# Patient Record
Sex: MALE | Race: WHITE | Employment: OTHER | URBAN - METROPOLITAN AREA
[De-identification: names, ages, dates, MRNs, and addresses within clinical notes are randomized per-mention and may not be internally consistent; named-entity substitution may affect disease eponyms.]

---

## 2020-03-25 ENCOUNTER — APPOINTMENT (OUTPATIENT)
Dept: GENERAL RADIOLOGY | Facility: CLINIC | Age: 75
DRG: 870 | End: 2020-03-25
Attending: INTERNAL MEDICINE
Payer: MEDICARE

## 2020-03-25 ENCOUNTER — HOSPITAL ENCOUNTER (INPATIENT)
Facility: CLINIC | Age: 75
LOS: 10 days | Discharge: ANOTHER HOSPITAL | DRG: 870 | End: 2020-04-04
Attending: INTERNAL MEDICINE | Admitting: INTERNAL MEDICINE
Payer: MEDICARE

## 2020-03-25 DIAGNOSIS — J69.0 ASPIRATION PNEUMONIA, UNSPECIFIED ASPIRATION PNEUMONIA TYPE, UNSPECIFIED LATERALITY, UNSPECIFIED PART OF LUNG (H): ICD-10-CM

## 2020-03-25 DIAGNOSIS — Z00.6 RESEARCH SUBJECT: Primary | ICD-10-CM

## 2020-03-25 DIAGNOSIS — Z00.6 RESEARCH STUDY PATIENT: Primary | ICD-10-CM

## 2020-03-25 DIAGNOSIS — U07.1 COVID-19 VIRUS INFECTION: ICD-10-CM

## 2020-03-25 PROBLEM — I10 ESSENTIAL HYPERTENSION, BENIGN: Status: ACTIVE | Noted: 2020-03-25

## 2020-03-25 LAB
ALBUMIN SERPL-MCNC: 2.1 G/DL (ref 3.4–5)
ALP SERPL-CCNC: 39 U/L (ref 40–150)
ALT SERPL W P-5'-P-CCNC: 28 U/L (ref 0–70)
ANION GAP SERPL CALCULATED.3IONS-SCNC: 4 MMOL/L (ref 3–14)
AST SERPL W P-5'-P-CCNC: 49 U/L (ref 0–45)
BASE DEFICIT BLDA-SCNC: 1.3 MMOL/L
BASE DEFICIT BLDA-SCNC: 6.6 MMOL/L
BILIRUB SERPL-MCNC: 0.4 MG/DL (ref 0.2–1.3)
BUN SERPL-MCNC: 13 MG/DL (ref 7–30)
CA-I BLD-MCNC: 3.4 MG/DL (ref 4.4–5.2)
CALCIUM SERPL-MCNC: 7.1 MG/DL (ref 8.5–10.1)
CHLORIDE SERPL-SCNC: 106 MMOL/L (ref 94–109)
CK SERPL-CCNC: 390 U/L (ref 30–300)
CO2 SERPL-SCNC: 25 MMOL/L (ref 20–32)
CREAT SERPL-MCNC: 0.89 MG/DL (ref 0.66–1.25)
CRP SERPL-MCNC: 180 MG/L (ref 0–8)
D DIMER PPP FEU-MCNC: 2.4 UG/ML FEU (ref 0–0.5)
ERYTHROCYTE [DISTWIDTH] IN BLOOD BY AUTOMATED COUNT: 13.2 % (ref 10–15)
GFR SERPL CREATININE-BSD FRML MDRD: 84 ML/MIN/{1.73_M2}
GLUCOSE SERPL-MCNC: 86 MG/DL (ref 70–99)
HCO3 BLD-SCNC: 17 MMOL/L (ref 21–28)
HCO3 BLD-SCNC: 23 MMOL/L (ref 21–28)
HCT VFR BLD AUTO: 37.3 % (ref 40–53)
HGB BLD-MCNC: 12.5 G/DL (ref 13.3–17.7)
INR PPP: 1.11 (ref 0.86–1.14)
LACTATE BLD-SCNC: 0.5 MMOL/L (ref 0.7–2)
MAGNESIUM SERPL-MCNC: 2.1 MG/DL (ref 1.6–2.3)
MCH RBC QN AUTO: 31.1 PG (ref 26.5–33)
MCHC RBC AUTO-ENTMCNC: 33.5 G/DL (ref 31.5–36.5)
MCV RBC AUTO: 93 FL (ref 78–100)
MRSA DNA SPEC QL NAA+PROBE: NEGATIVE
O2/TOTAL GAS SETTING VFR VENT: 100 %
O2/TOTAL GAS SETTING VFR VENT: 60 %
PCO2 BLD: 26 MM HG (ref 35–45)
PCO2 BLD: 38 MM HG (ref 35–45)
PH BLD: 7.4 PH (ref 7.35–7.45)
PH BLD: 7.42 PH (ref 7.35–7.45)
PHOSPHATE SERPL-MCNC: 1.7 MG/DL (ref 2.5–4.5)
PLATELET # BLD AUTO: 82 10E9/L (ref 150–450)
PO2 BLD: 75 MM HG (ref 80–105)
PO2 BLD: 77 MM HG (ref 80–105)
POTASSIUM BLD-SCNC: 2.5 MMOL/L (ref 3.4–5.3)
POTASSIUM SERPL-SCNC: 3.6 MMOL/L (ref 3.4–5.3)
PROCALCITONIN SERPL-MCNC: 0.29 NG/ML
PROT SERPL-MCNC: 5.5 G/DL (ref 6.8–8.8)
RBC # BLD AUTO: 4.02 10E12/L (ref 4.4–5.9)
RESEARCH KIT COLLECTION: NORMAL
RESEARCH KIT COLLECTION: NORMAL
SODIUM SERPL-SCNC: 135 MMOL/L (ref 133–144)
SPECIMEN SOURCE: NORMAL
TROPONIN I SERPL-MCNC: 0.04 UG/L (ref 0–0.04)
WBC # BLD AUTO: 4.5 10E9/L (ref 4–11)

## 2020-03-25 PROCEDURE — 85610 PROTHROMBIN TIME: CPT | Performed by: INTERNAL MEDICINE

## 2020-03-25 PROCEDURE — 87040 BLOOD CULTURE FOR BACTERIA: CPT | Performed by: INTERNAL MEDICINE

## 2020-03-25 PROCEDURE — 85379 FIBRIN DEGRADATION QUANT: CPT | Performed by: INTERNAL MEDICINE

## 2020-03-25 PROCEDURE — 85027 COMPLETE CBC AUTOMATED: CPT | Performed by: INTERNAL MEDICINE

## 2020-03-25 PROCEDURE — 83605 ASSAY OF LACTIC ACID: CPT | Performed by: INTERNAL MEDICINE

## 2020-03-25 PROCEDURE — 36415 COLL VENOUS BLD VENIPUNCTURE: CPT | Performed by: INTERNAL MEDICINE

## 2020-03-25 PROCEDURE — 93005 ELECTROCARDIOGRAM TRACING: CPT

## 2020-03-25 PROCEDURE — 25000128 H RX IP 250 OP 636: Performed by: INTERNAL MEDICINE

## 2020-03-25 PROCEDURE — 83615 LACTATE (LD) (LDH) ENZYME: CPT | Performed by: INTERNAL MEDICINE

## 2020-03-25 PROCEDURE — 40000275 ZZH STATISTIC RCP TIME EA 10 MIN

## 2020-03-25 PROCEDURE — 83735 ASSAY OF MAGNESIUM: CPT | Performed by: INTERNAL MEDICINE

## 2020-03-25 PROCEDURE — 86140 C-REACTIVE PROTEIN: CPT | Performed by: INTERNAL MEDICINE

## 2020-03-25 PROCEDURE — 25800030 ZZH RX IP 258 OP 636: Performed by: STUDENT IN AN ORGANIZED HEALTH CARE EDUCATION/TRAINING PROGRAM

## 2020-03-25 PROCEDURE — 82803 BLOOD GASES ANY COMBINATION: CPT | Performed by: INTERNAL MEDICINE

## 2020-03-25 PROCEDURE — 25000128 H RX IP 250 OP 636: Performed by: STUDENT IN AN ORGANIZED HEALTH CARE EDUCATION/TRAINING PROGRAM

## 2020-03-25 PROCEDURE — 40000937 ZZHCL STATISTIC RESEARCH KIT COLLECTION: Performed by: INTERNAL MEDICINE

## 2020-03-25 PROCEDURE — 80053 COMPREHEN METABOLIC PANEL: CPT | Performed by: INTERNAL MEDICINE

## 2020-03-25 PROCEDURE — 87641 MR-STAPH DNA AMP PROBE: CPT | Performed by: INTERNAL MEDICINE

## 2020-03-25 PROCEDURE — 84100 ASSAY OF PHOSPHORUS: CPT | Performed by: INTERNAL MEDICINE

## 2020-03-25 PROCEDURE — 40000986 XR ABDOMEN PORT 1 VW

## 2020-03-25 PROCEDURE — 82550 ASSAY OF CK (CPK): CPT | Performed by: INTERNAL MEDICINE

## 2020-03-25 PROCEDURE — 87640 STAPH A DNA AMP PROBE: CPT | Performed by: INTERNAL MEDICINE

## 2020-03-25 PROCEDURE — 25000125 ZZHC RX 250: Performed by: STUDENT IN AN ORGANIZED HEALTH CARE EDUCATION/TRAINING PROGRAM

## 2020-03-25 PROCEDURE — 40000986 XR CHEST PORT 1 VW

## 2020-03-25 PROCEDURE — 99291 CRITICAL CARE FIRST HOUR: CPT | Mod: GC | Performed by: INTERNAL MEDICINE

## 2020-03-25 PROCEDURE — 99001 SPECIMEN HANDLING PT-LAB: CPT | Performed by: INTERNAL MEDICINE

## 2020-03-25 PROCEDURE — 84132 ASSAY OF SERUM POTASSIUM: CPT | Performed by: INTERNAL MEDICINE

## 2020-03-25 PROCEDURE — 82803 BLOOD GASES ANY COMBINATION: CPT | Performed by: STUDENT IN AN ORGANIZED HEALTH CARE EDUCATION/TRAINING PROGRAM

## 2020-03-25 PROCEDURE — 25000132 ZZH RX MED GY IP 250 OP 250 PS 637: Mod: GY | Performed by: STUDENT IN AN ORGANIZED HEALTH CARE EDUCATION/TRAINING PROGRAM

## 2020-03-25 PROCEDURE — 5A1955Z RESPIRATORY VENTILATION, GREATER THAN 96 CONSECUTIVE HOURS: ICD-10-PCS | Performed by: INTERNAL MEDICINE

## 2020-03-25 PROCEDURE — 84484 ASSAY OF TROPONIN QUANT: CPT | Performed by: INTERNAL MEDICINE

## 2020-03-25 PROCEDURE — 85379 FIBRIN DEGRADATION QUANT: CPT | Performed by: STUDENT IN AN ORGANIZED HEALTH CARE EDUCATION/TRAINING PROGRAM

## 2020-03-25 PROCEDURE — 94002 VENT MGMT INPAT INIT DAY: CPT

## 2020-03-25 PROCEDURE — 20000004 ZZH R&B ICU UMMC

## 2020-03-25 PROCEDURE — 00000146 ZZHCL STATISTIC GLUCOSE BY METER IP

## 2020-03-25 PROCEDURE — 93010 ELECTROCARDIOGRAM REPORT: CPT | Performed by: INTERNAL MEDICINE

## 2020-03-25 PROCEDURE — 84145 PROCALCITONIN (PCT): CPT | Performed by: INTERNAL MEDICINE

## 2020-03-25 PROCEDURE — 82330 ASSAY OF CALCIUM: CPT | Performed by: INTERNAL MEDICINE

## 2020-03-25 RX ORDER — ONDANSETRON 4 MG/1
4 TABLET, ORALLY DISINTEGRATING ORAL EVERY 6 HOURS PRN
Status: DISCONTINUED | OUTPATIENT
Start: 2020-03-25 | End: 2020-04-04 | Stop reason: HOSPADM

## 2020-03-25 RX ORDER — POLYETHYLENE GLYCOL 3350 17 G/17G
17 POWDER, FOR SOLUTION ORAL DAILY PRN
Status: DISCONTINUED | OUTPATIENT
Start: 2020-03-25 | End: 2020-04-04 | Stop reason: HOSPADM

## 2020-03-25 RX ORDER — PROPOFOL 10 MG/ML
10-20 INJECTION, EMULSION INTRAVENOUS EVERY 30 MIN PRN
Status: DISCONTINUED | OUTPATIENT
Start: 2020-03-25 | End: 2020-03-25

## 2020-03-25 RX ORDER — FENTANYL CITRATE 50 UG/ML
25-50 INJECTION, SOLUTION INTRAMUSCULAR; INTRAVENOUS
Status: DISCONTINUED | OUTPATIENT
Start: 2020-03-25 | End: 2020-03-26

## 2020-03-25 RX ORDER — AMOXICILLIN 250 MG
2 CAPSULE ORAL 2 TIMES DAILY PRN
Status: DISCONTINUED | OUTPATIENT
Start: 2020-03-25 | End: 2020-03-26

## 2020-03-25 RX ORDER — PROPOFOL 10 MG/ML
10-20 INJECTION, EMULSION INTRAVENOUS EVERY 30 MIN PRN
Status: DISCONTINUED | OUTPATIENT
Start: 2020-03-25 | End: 2020-03-26

## 2020-03-25 RX ORDER — CEFTRIAXONE 2 G/1
2 INJECTION, POWDER, FOR SOLUTION INTRAMUSCULAR; INTRAVENOUS EVERY 24 HOURS
Status: DISCONTINUED | OUTPATIENT
Start: 2020-03-25 | End: 2020-03-29

## 2020-03-25 RX ORDER — POTASSIUM CHLORIDE 7.45 MG/ML
10 INJECTION INTRAVENOUS
Status: DISCONTINUED | OUTPATIENT
Start: 2020-03-25 | End: 2020-03-27

## 2020-03-25 RX ORDER — POTASSIUM CHLORIDE 750 MG/1
20-40 TABLET, EXTENDED RELEASE ORAL
Status: DISCONTINUED | OUTPATIENT
Start: 2020-03-25 | End: 2020-03-27

## 2020-03-25 RX ORDER — NALOXONE HYDROCHLORIDE 0.4 MG/ML
.1-.4 INJECTION, SOLUTION INTRAMUSCULAR; INTRAVENOUS; SUBCUTANEOUS
Status: DISCONTINUED | OUTPATIENT
Start: 2020-03-25 | End: 2020-03-25

## 2020-03-25 RX ORDER — VECURONIUM BROMIDE 1 MG/ML
10 INJECTION, POWDER, LYOPHILIZED, FOR SOLUTION INTRAVENOUS ONCE
Status: COMPLETED | OUTPATIENT
Start: 2020-03-25 | End: 2020-03-25

## 2020-03-25 RX ORDER — MAGNESIUM SULFATE HEPTAHYDRATE 40 MG/ML
4 INJECTION, SOLUTION INTRAVENOUS EVERY 4 HOURS PRN
Status: DISCONTINUED | OUTPATIENT
Start: 2020-03-25 | End: 2020-04-04 | Stop reason: HOSPADM

## 2020-03-25 RX ORDER — ALBUTEROL SULFATE 90 UG/1
6 AEROSOL, METERED RESPIRATORY (INHALATION) EVERY 4 HOURS PRN
Status: DISCONTINUED | OUTPATIENT
Start: 2020-03-25 | End: 2020-04-04 | Stop reason: HOSPADM

## 2020-03-25 RX ORDER — AMOXICILLIN 250 MG
1 CAPSULE ORAL 2 TIMES DAILY PRN
Status: DISCONTINUED | OUTPATIENT
Start: 2020-03-25 | End: 2020-03-26

## 2020-03-25 RX ORDER — MIDAZOLAM (PF) 1 MG/ML IN 0.9 % SODIUM CHLORIDE INTRAVENOUS SOLUTION
1-8 CONTINUOUS
Status: DISCONTINUED | OUTPATIENT
Start: 2020-03-25 | End: 2020-04-04 | Stop reason: HOSPADM

## 2020-03-25 RX ORDER — ACETAMINOPHEN 325 MG/1
650 TABLET ORAL EVERY 4 HOURS PRN
Status: DISCONTINUED | OUTPATIENT
Start: 2020-03-25 | End: 2020-03-25

## 2020-03-25 RX ORDER — PROPOFOL 10 MG/ML
5-75 INJECTION, EMULSION INTRAVENOUS CONTINUOUS
Status: DISCONTINUED | OUTPATIENT
Start: 2020-03-25 | End: 2020-03-26

## 2020-03-25 RX ORDER — POTASSIUM CHLORIDE 1.5 G/1.58G
20-40 POWDER, FOR SOLUTION ORAL
Status: DISCONTINUED | OUTPATIENT
Start: 2020-03-25 | End: 2020-03-27

## 2020-03-25 RX ORDER — FAMOTIDINE 20 MG/1
20 TABLET, FILM COATED ORAL 2 TIMES DAILY
Status: DISCONTINUED | OUTPATIENT
Start: 2020-03-25 | End: 2020-04-04 | Stop reason: HOSPADM

## 2020-03-25 RX ORDER — NALOXONE HYDROCHLORIDE 0.4 MG/ML
.1-.4 INJECTION, SOLUTION INTRAMUSCULAR; INTRAVENOUS; SUBCUTANEOUS
Status: DISCONTINUED | OUTPATIENT
Start: 2020-03-25 | End: 2020-04-04 | Stop reason: HOSPADM

## 2020-03-25 RX ORDER — PROPOFOL 10 MG/ML
5-75 INJECTION, EMULSION INTRAVENOUS CONTINUOUS
Status: DISCONTINUED | OUTPATIENT
Start: 2020-03-25 | End: 2020-03-25

## 2020-03-25 RX ORDER — POTASSIUM CHLORIDE 29.8 MG/ML
20 INJECTION INTRAVENOUS
Status: DISCONTINUED | OUTPATIENT
Start: 2020-03-25 | End: 2020-03-27

## 2020-03-25 RX ORDER — FUROSEMIDE 10 MG/ML
40 INJECTION INTRAMUSCULAR; INTRAVENOUS ONCE
Status: COMPLETED | OUTPATIENT
Start: 2020-03-25 | End: 2020-03-25

## 2020-03-25 RX ORDER — POTASSIUM CL/LIDO/0.9 % NACL 10MEQ/0.1L
10 INTRAVENOUS SOLUTION, PIGGYBACK (ML) INTRAVENOUS
Status: DISCONTINUED | OUTPATIENT
Start: 2020-03-25 | End: 2020-03-27

## 2020-03-25 RX ORDER — DOXAZOSIN 1 MG/1
1 TABLET ORAL DAILY
Status: DISCONTINUED | OUTPATIENT
Start: 2020-03-26 | End: 2020-04-04 | Stop reason: HOSPADM

## 2020-03-25 RX ORDER — TAMSULOSIN HYDROCHLORIDE 0.4 MG/1
0.4 CAPSULE ORAL DAILY
Status: DISCONTINUED | OUTPATIENT
Start: 2020-03-26 | End: 2020-03-25

## 2020-03-25 RX ORDER — ONDANSETRON 2 MG/ML
4 INJECTION INTRAMUSCULAR; INTRAVENOUS EVERY 6 HOURS PRN
Status: DISCONTINUED | OUTPATIENT
Start: 2020-03-25 | End: 2020-04-04 | Stop reason: HOSPADM

## 2020-03-25 RX ADMIN — PROPOFOL 10 MG: 10 INJECTION, EMULSION INTRAVENOUS at 21:40

## 2020-03-25 RX ADMIN — FENTANYL CITRATE 25 MCG: 50 INJECTION, SOLUTION INTRAMUSCULAR; INTRAVENOUS at 21:30

## 2020-03-25 RX ADMIN — PROPOFOL 65 MCG/KG/MIN: 10 INJECTION, EMULSION INTRAVENOUS at 18:11

## 2020-03-25 RX ADMIN — Medication 50 MCG/HR: at 16:14

## 2020-03-25 RX ADMIN — ACETAMINOPHEN 650 MG: 325 SOLUTION ORAL at 16:00

## 2020-03-25 RX ADMIN — ENOXAPARIN SODIUM 40 MG: 40 INJECTION SUBCUTANEOUS at 17:57

## 2020-03-25 RX ADMIN — PROPOFOL 70 MCG/KG/MIN: 10 INJECTION, EMULSION INTRAVENOUS at 14:33

## 2020-03-25 RX ADMIN — PROPOFOL 55 MCG/KG/MIN: 10 INJECTION, EMULSION INTRAVENOUS at 21:26

## 2020-03-25 RX ADMIN — PROPOFOL 70 MCG/KG/MIN: 10 INJECTION, EMULSION INTRAVENOUS at 14:51

## 2020-03-25 RX ADMIN — FAMOTIDINE 20 MG: 20 TABLET ORAL at 20:14

## 2020-03-25 RX ADMIN — FUROSEMIDE 40 MG: 10 INJECTION, SOLUTION INTRAMUSCULAR; INTRAVENOUS at 23:39

## 2020-03-25 RX ADMIN — CEFTRIAXONE SODIUM 2 G: 2 INJECTION, POWDER, FOR SOLUTION INTRAMUSCULAR; INTRAVENOUS at 21:25

## 2020-03-25 RX ADMIN — AZITHROMYCIN MONOHYDRATE 250 MG: 500 INJECTION, POWDER, LYOPHILIZED, FOR SOLUTION INTRAVENOUS at 22:24

## 2020-03-25 RX ADMIN — DEXTROSE MONOHYDRATE 3 MCG/KG/MIN: 50 INJECTION, SOLUTION INTRAVENOUS at 23:45

## 2020-03-25 RX ADMIN — VECURONIUM BROMIDE 10 MG: 1 INJECTION, POWDER, LYOPHILIZED, FOR SOLUTION INTRAVENOUS at 23:36

## 2020-03-25 RX ADMIN — SODIUM PHOSPHATE, MONOBASIC, MONOHYDRATE AND SODIUM PHOSPHATE, DIBASIC, ANHYDROUS 20 MMOL: 276; 142 INJECTION, SOLUTION INTRAVENOUS at 17:57

## 2020-03-25 RX ADMIN — CALCIUM GLUCONATE 2 G: 98 INJECTION, SOLUTION INTRAVENOUS at 16:02

## 2020-03-25 RX ADMIN — PROPOFOL 70 MCG/KG/MIN: 10 INJECTION, EMULSION INTRAVENOUS at 16:01

## 2020-03-25 RX ADMIN — FENTANYL CITRATE 50 MCG: 50 INJECTION, SOLUTION INTRAMUSCULAR; INTRAVENOUS at 23:52

## 2020-03-25 ASSESSMENT — ACTIVITIES OF DAILY LIVING (ADL)
ADLS_ACUITY_SCORE: 15
ADLS_ACUITY_SCORE: 13

## 2020-03-25 NOTE — H&P
MICU History and Physical  [facility], [place]  Date of Admission: March 25, 2020    Chief Complaint: Difficulty breathing  -----------------------------------------------------------------  History of Present Illness     Eduardo Kemp is  73 YO M with PMH of HTN, HLD, and BPH who is transferring from [facility] ICU to [facility] [place] ICU for Remdesivir trial.     Returned from [place] on 3/19. He had been feeling weak and fatigued since return home. He wa seen in the ED on March 21 for dehydration. Went to [facility] on 3/23, he reported fever to 102.3, shortness of breath with O2 sat in 80s. Denied vomiting or diarrhea. No international travel and not a resident of a nursing home.      Labs initially showed pancytopenia with WBC of 3.9 and platelets of 73, INR 1.2 sodium 131 creatinine 1.21 as well as low albumin of 2.8, LFTs appeared to be unremarkable lactic acid was 1.5, glycosylated hemoglobin A1c is 5.2, VBG showing pH of 7.43 as well as PCO2 of 33. Cardiac enzymes were negative. UA was negative for esterase as well as nitrite. Patient did undergo CT head with no acute finding, chest x-ray was notable for PNA, showed patchy infiltrates at the left lung base as well as small infiltrate in the right midlung reflecting pneumonia. He was started on cefdinir ad azithromycin. COVID-19 testing sent. Admitted to [facility] ICU 3/25 in early AM due to respiratory failure. Intubated. COVID-19 testing returned positive on 3/25.     [facility] placed right internal jugular catheter and brachial arterial line. Remains intubated. Last ventilator settings were AC RR 20,  mL, PEEP 12, FiO2 60%. On Propofol. Not on pressors. No paralytics. Transferred to [facility] [place] for enrollment in Remdesivir trial.      -----------------------------------------------------------------  Assessment & Plan   NEURO/PSYCH  # Sedation  - Propofol gtt    # Pain  - Fentanyl gtt, and fentanyl prn    #  Acute encephalopathy 2/2 sedatives  Patient initially presented weak following a syncopal event at home. A CT Head on 3/23 at OSH showed no acute intracranial hemorrhage or acute abnormality of the brain, and mild diffuse cerebral atrophy and chronic white matter ischemic change. Currently sedated on propofol.    CARDIOVASCULAR  # Hemodynamics  Stable. Not requiring pressors at OSH.    # Bradycardia  Cardiology at OSH consulted. Tele without evidence of pauses or heart block. Per cards note, bradycardia may be due to hypoxia, apnea with sleep and increased vagal tone, and his infection.  - telemetry  - EKG  - consider TTE following acute COVID infection    # Elevated Troponin  Initial trop <0.056 -> 0.056 this AM. Trop on arrival 0.036. EKG on arrival showing sinus bradycardia    # H/o HTN  -Hold PTA amlodipine 5 mg   -Hold PTA hydrochlorothiazide 12.5 mg daily    PULMONARY  # ARDS  # Acute Hypoxic Respiratory Failure 2/2 COVID-19   P:F ratio in 120s. Not proning at this time. No paralytics given.  - repeat ABG in AM  Ventilation Mode: CMV/AC  (Continuous Mandatory Ventilation/ Assist Control)  FiO2 (%): 60 %  Rate Set (breaths/minute): 20 breaths/min  Tidal Volume Set (mL): 450 mL  PEEP (cm H2O): 12 cmH2O  Oxygen Concentration (%): 60 %  Resp: 21    GI  # Acute diarrhea   Per literature GI symptoms seen early in COVID-19 cases. Enteric panel and CDiff neg.  - CTM  - prn Zofran for nausea    # Nutrition: Orders Placed This Encounter      NPO for Medical/Clinical Reasons Except for: NPO but receiving Tube Feeding  - will discuss TF's in AM    # Stress Ulcer prophylaxis: Famotidine    RENAL  # Hypokalemia  - K replacement protocol  - Mg and Phosph replacement protocol  - BMP on arrival    # Hyponatremia, resolved  Patient treated for hypovolemia at OSH in setting of diarrhea and diuresis on HCTX.   - track I's and O's  - BMP on arrival    # Hypocalcemia  iCa on arrival 3.4. S/p 2 g Calcium gluconate.  - repeat iCa  -  replete prn    # BPH  -PTA on tamsulosin 0.4 mg daily; hold    # Volume - Euvolemic  S/p IVF at OSH in setting of initial dehydration.    HEME/ONC  # Pancytopenia  - trend CBC    # Transfusion History: none    ENDOCRINE  BG stable. HgbA1c 5.2.  - q4 BG because NPO    INFECTIOUS DISEASE  # Sepsis  # COVID-19 Infection  Initially had extensive infectious work-up at OSH. C Diff neg, enteric panel neg. Flu A and B neg. Strep pneumo and Legionella neg. UA neg for LE and nitrite. 1 of 2 Blood cultures on 03/23 grew Staph Epidermidis. ID consulted and suspected contaminant. CXR showing bilateral pulmonary infiltrates. COVID testing positive on 03/25/20. OSH discussed with RACHELL. Elevated D-dimer (443 at OSH, normal 0-230), , normal WBC with lymphopenia 0.63. BUN 12 and Cre 0.77, ALT 27, AST 47, AP 37.  - Infectious disease consult  - Remdesivir study, consented family over phone, pharmacy aware and will randomize treatment  - Contact, droplet, airborne precautions  - Supportive therapy  - CBC with diff, CMP, CK, troponin  - Blood cultures  - no Tylenol    # Antimicrobials  Stop Cefdinir, Start Ceftriaxone 2 g IV, duration until 03/27  Azithromycine 250 mg qd    # Cultures  - Blood culture 03/25/20    SKIN/MSK  PT and OT consult    Lines: RIJ and R brachial artery               ETT, OG vs NG    Prophylaxis:     - DVT: Enoxaparin   - GI: Famotidine  Family: updated wife  Disposition: ICU  Code Status: Full    Krzysztof Owens MD  Internal Medicine, PGY-1  MICU-2, [id number]    -----------------------------------------------------------------  Physical Exam   Blood pressure 117/63, temperature 102.7  F (39.3  C), resp. rate 21, weight 108.5 kg (239 lb 3.2 oz), SpO2 93 %.    I/O last 3 completed shifts:  In: -   Out: 125 [Urine:125]  Wt Readings from Last 5 Encounters:   03/25/20 108.5 kg (239 lb 3.2 oz)       Ventilation Mode: CMV/AC  (Continuous Mandatory Ventilation/ Assist Control)  FiO2 (%): 60 %  Rate Set (breaths/minute):  20 breaths/min  Tidal Volume Set (mL): 450 mL  PEEP (cm H2O): 12 cmH2O  Oxygen Concentration (%): 60 %  Resp: 21    Arterial Line BP: (105-122)/(43-55) 105/45  MAP:  [63 mmHg-75 mmHg] 63 mmHg    Exam deferred. See attending note.    -----------------------------------------------------------------  Additional Patient History  Review of Systems   The 10 point Review of Systems is negative other than noted in the HPI or here.    Past Medical History    - ACE-inhibitor-aggravated Angioedema  - HLD  - HTN    Past Surgical History   - Hemorrhoid banding  - Tonsillectomy    Social History   Per chart review, former smoker - 20 pack year history. Occasional alcohol use.     Family History   - Coronary heart disease, brother  - Stroke  - Heart disease, father  - Hypertension     Prior to Admission Medications   No current facility-administered medications on file prior to encounter.   No current outpatient medications on file prior to encounter.    Allergies      Allergies   Allergen Reactions     Ace Inhibitors      Possible angioedema 7/15.       Pertinent Data:     OSH Labs:  CBC: WBC 4.3, Abs Lymph 0.8, Hgb 11, Plt 75  BMP: Na 137, K 3.4, Cl 108, Co2 23, BUN 12, Cre 0.77 BG 94  ALT 27, AST 47, AP 37, TP 5.3, DB 0.14, TB 0.3  PT 13.5, INR 1.2  AG 6, Ca 7.1  Lactate: 1.5  Trop 0.056, , DDimer 443    DIAGNOSTIC STUDIES  ROUTINE ICU LABS  CMP  Recent Labs   Lab 03/25/20  1447 03/25/20  1446     --    POTASSIUM 3.6 2.5*   CHLORIDE 106  --    CO2 25  --    ANIONGAP 4  --    GLC 86  --    BUN 13  --    CR 0.89  --    GFRESTIMATED 84  --    GFRESTBLACK >90  --    RICKY 7.1*  --    MAG 2.1  --    PHOS 1.7*  --    PROTTOTAL 5.5*  --    ALBUMIN 2.1*  --    BILITOTAL 0.4  --    ALKPHOS 39*  --    AST 49*  --    ALT 28  --      CBC  Recent Labs   Lab 03/25/20  1447   WBC 4.5   RBC 4.02*   HGB 12.5*   HCT 37.3*   MCV 93   MCH 31.1   MCHC 33.5   RDW 13.2   PLT 82*     INR  Recent Labs   Lab 03/25/20  1447   INR 1.11      Lactic Acid  Recent Labs   Lab 03/25/20  1446   LACT 0.5*     Urine StudiesNo lab results found.  Arterial Blood Gas  Recent Labs   Lab 03/25/20  1446   PH 7.42   PCO2 26*   PO2 77*   HCO3 17*   O2PER 60     Venous Blood Gas   Recent Labs   Lab 03/25/20  1446   O2PER 60       MICROBIOLOGY  Significant culture results:    03/25/20 Blood culture: pending    IMAGING  03/25/20 CXR 2  Result Impression   IMPRESSION:     1.  Right IJ line terminates in the distal SVC 2.9 cm above the cavoatrial junction.  2.  Lines and tubes otherwise stable.  3.  Increased retrocardiac consolidation with bilateral asymmetric infiltrates again noted.     03/25/20 CXR 1  There are extensive bilateral pulmonary infiltrates most prominent in the mid and lower lungs with marked increase since the prior study. The heart size and mediastinal contours are stable. No pneumothorax. No significant pleural effusion evident.    03/23/20 EKG  Rate: 45. QtC 436  Rhythm: sinus bradycardia, normal intervals, normal axis  No new ST/T changessuggestive of ischemia    03/23/20 CXR  Patchy opacities at the left lung base involving the lingula and left lower lobe consistent with pneumonia. Small infiltrate in the right mid lung also likely reflects an area of pneumonia.    03/23/20 CT Head  Result Impression   IMPRESSION:    1.  No acute intracranial hemorrhage or acute abnormality of the brain.  2.  Mild diffuse cerebral atrophy and chronic white matter ischemic change.

## 2020-03-25 NOTE — PROGRESS NOTES
Admitted/transferred from: [facility] at ~1400  Reason for admission/transfer: Covid (+) admitted for drug trial.  Patient status upon admission/transfer: Sedated, RASS -4, BP via A-line 110's/60's. Febrile, 102.5 axillary.   Interventions: Attached to ICU monitors, labs drawn.   Plan: Maintain current vent settings, add continuous Fentanyl for comfort, wean Propofol to achieve RASS of -1 to 0.   2 RN skin assessment: completed by SUSAN Mcfadden and SUSAN Lobo  Result of skin assessment and interventions/actions: CDI, intact.  Height, weight, drug calc weight: done.  Patient belongings (see Flowsheet - Adult Profile for details): clothing, shoes, Brayan, cell phone, several electronic cords.  MDRO education (if applicable): N/A - pt intubated and heavily sedated.

## 2020-03-25 NOTE — PROGRESS NOTES
ICU End of Shift Summary. See flowsheets for vital signs and detailed assessment.    Changes this shift: Weaning sedation slowly to achieve lighter RASS, pupils pinpoint and only coughing noted w/suctioning. Fentanyl gtt added for comfort. NPO, 1x dose of tylenol give for fever - per research team, NO remaining tylenol to given. 1st dose of Remdesivir given.     Plan: Continue to monitor closely overnight. Recheck phos at 00 per replacement protocol.

## 2020-03-25 NOTE — CONSULTS
[facility] ID Service: Initial Consultation     Patient:  Eduardo Kemp  Date of birth 1945  Medical record number [id number]  Consult Requested by: MICU Team         Assessment and Recommendations:   Problem List:  1) COVID-19  2) Acute hypoxic respiratory failure  3) Possible secondary LLL bacterial pneumonia  4) Thrombocytopenia  5) Elevated CK  6) Minimally elevated troponin  7) Bradycardia  8) S. Epidermidis in blood culture (3/23)  9) HTN    Recommendations:  1) CBC with diff, CMP, CK, troponin  2) Agree with blood cultures  3) Continue azithromycin 250mg daily through 3/27 (dose today)  4) Start ceftriaxone 2g IV daily (dose today)  5) Remdesivir clinical trial labs and study drug per clinical research team    Discussion:  Eduardo Kemp is a 74 year old male with HTN and remote history of tobacco use who was admitted to [facility] on 3/25 with COVID-19. Initially presented to [facility] on 3/23 with weakness, fever to 102.3, shortness of breath, and diarrhea for four days prior in setting of travel to [place]. He was subsequently admitted with COVID-19 testing sent on admission and he was started on cefdinir and azithromycin for possible LLL pneumonia. Early 3/25 he had respiratory decompensation with shortness of breath, increased work of breathing, and hypoxia requiring intubation following which he was transferred first to [facility] and then to [facility]. Notable laboratory abnormalities lymphocytopenia with jak  (3/23), thrombocytopenia with jak 68 (3/24),  (3/25), and troponin 0.056 (3/25).    He is currently enrolled in remdesivir clinical trial. Given LLL infiltrate and hypoxic respiratory failure would complete antibiotic course for community acquired pneumonia as well. Appreciate supportive care measures provided by MICU team.    Patient was discussed with attending physician, Dr. Lake.  Recommendations were conveyed to the primary team via  telephone.  ID will continue to follow    Laura Blankenship DO  Infectious Disease Fellow    Attestation:  I discussed this patient with the fellow and/or resident(s) and agree with the findings and plan in this note. I also personally edited this note to reflect my findings. I have reviewed today's vital signs, medications, labs and imaging.    I did not see patient on 3/25/2020 in order to limit in person exposure and conserve PPE     Douglas Lake MD,M.Med.Sc.  Infectious Diseases  Pager: [phone]        Time spent reviewing chart : 30 min        History of Present Illness:   Eduardo Kemp is a 74 year old male with pertinent comorbid conditions of HTN who was admitted on 3/25/2020 as transfer for Covid-19 clinical trial and ID consulted for assistance with management.      Initially had symptoms of weakness and fatigue since travel to [place] about four days prior to admission at OSH. Seen on 3/21 in ED with dehydration and discharged then admitted to [place] 3/23 with weakness, fever (102), shortness of breath and diarrhea at which time flu was negative, covid sent, and cxr with left lung base patchy infiltrate. At admission SpO2 was in 80s and temp 102.3 Concern for CAP and started on azithromycin and cefdinir. Admission labs notable for mild lymphopenia and thrombocytopenia as well as hyponatremia and slightly elevated creatinine. Blood cultures with 1/2 positive for GPC identified as Staph epi and considered contaminant. Did have bradycardia to 30s with plan for nonurgent TTE at some point from cardiology. Overnight 3/24 - 3/25 noted to become more short of breath around 0200 and initially placed on 9L O2 via oxy mask. ABG done and 7.4/33/33 and subsequently intubated around 3am then transferred to [facility] where arterial line and central line were placed. Ceftriaxone and azithromycin not continued upon arrival.    In MICU he is currently intubated and sedated with propofol,  analgesia with fentanyl. He is not requiring pressors. Temp 102.6. Vent settings with FiO2 60% and PEEP 12. Patient not physically seen to minimize PPE use. Discussed patient briefly with bedside RN.         Review of Systems:   Complete ROS obtained, pertinent positives and negatives as above.       Past Medical and Surgical History:   HTN  BPH      Allergies:      Allergies   Allergen Reactions     Ace Inhibitors      Possible angioedema 7/15.            Current Antimicrobials:   Cefdinir 3/23 - 3/24  Azithromycin 3/23 - 3/24         Family History:   Cardiovascular disease noted in Care Everywhere (brother, father)         Social History:     Former smoker (20 pack years, quit 1980)  Alcohol use           Physical Exam:   Ranges forvital signs:  Temp:  [102.4  F (39.1  C)-102.6  F (39.2  C)] 102.6  F (39.2  C)  Heart Rate:  [56] 56  Resp:  [20] 20  BP: (117)/(63) 117/63  MAP:  [64 mmHg] 64 mmHg  Arterial Line BP: (112)/(44) 112/44  FiO2 (%):  [60 %] 60 %  SpO2:  [97 %] 97 %    Exam: deferred to preserve PPE; patient intubated and sedated in ICU         Laboratory Data:   Reviewed via Care Everywhere.  3/23  WBC 3.9   -   HGB 14.6  PLT 73  INR 1.2  Na 131  Cr 1.21  UA small occult blood  D dimer 443    3/35  WBC 4.3  HGB 11  PLT 75  Trop 0.056  Na 137  Cr 0.77    ABG 7.4/33/33 --> 7.39/34/79    Culture data:  Covid + 3/23  Flu A/B - 3/23  Stool pcr - 3/23  Cdiff - 3/23  BCx +S epi (3/23) - presumed contaminant           Imaging:   CXR 3/25  IMPRESSION:   1.  Right IJ line terminates in the distal SVC 2.9 cm above the cavoatrial junction.  2.  Lines and tubes otherwise stable.  3.  Increased retrocardiac consolidation with bilateral asymmetric infiltrates again noted.    CXR 3/23  FINDINGS: Low lung volumes. There is patchy infiltrate seen at the left lung base with some infiltrate projecting over the heart and posteriorly on the lateral view. Findings likely reflect pneumonia. Right midlung  opacity could also represent an area of pneumonia. No pleural effusion. Heart size is normal.    CT Head 3/23  IMPRESSION:  1.  No acute intracranial hemorrhage or acute abnormality of the brain.  2.  Mild diffuse cerebral atrophy and chronic white matter ischemic change.

## 2020-03-26 ENCOUNTER — APPOINTMENT (OUTPATIENT)
Dept: GENERAL RADIOLOGY | Facility: CLINIC | Age: 75
DRG: 870 | End: 2020-03-26
Attending: INTERNAL MEDICINE
Payer: MEDICARE

## 2020-03-26 ENCOUNTER — APPOINTMENT (OUTPATIENT)
Dept: CARDIOLOGY | Facility: CLINIC | Age: 75
DRG: 870 | End: 2020-03-26
Attending: INTERNAL MEDICINE
Payer: MEDICARE

## 2020-03-26 LAB
ALBUMIN SERPL-MCNC: 2 G/DL (ref 3.4–5)
ALP SERPL-CCNC: 41 U/L (ref 40–150)
ALT SERPL W P-5'-P-CCNC: 29 U/L (ref 0–70)
ANION GAP SERPL CALCULATED.3IONS-SCNC: 7 MMOL/L (ref 3–14)
AST SERPL W P-5'-P-CCNC: 56 U/L (ref 0–45)
BASE DEFICIT BLDA-SCNC: 0.2 MMOL/L
BASE DEFICIT BLDA-SCNC: 1.4 MMOL/L
BASE EXCESS BLDA CALC-SCNC: 0.3 MMOL/L
BASE EXCESS BLDA CALC-SCNC: 0.8 MMOL/L
BASE EXCESS BLDA CALC-SCNC: 1 MMOL/L
BASOPHILS # BLD AUTO: 0 10E9/L (ref 0–0.2)
BASOPHILS NFR BLD AUTO: 0 %
BILIRUB SERPL-MCNC: 0.3 MG/DL (ref 0.2–1.3)
BUN SERPL-MCNC: 11 MG/DL (ref 7–30)
CALCIUM SERPL-MCNC: 7.4 MG/DL (ref 8.5–10.1)
CHLORIDE SERPL-SCNC: 105 MMOL/L (ref 94–109)
CK SERPL-CCNC: 272 U/L (ref 30–300)
CO2 SERPL-SCNC: 24 MMOL/L (ref 20–32)
CREAT SERPL-MCNC: 0.9 MG/DL (ref 0.66–1.25)
CRP SERPL-MCNC: 250 MG/L (ref 0–8)
D DIMER PPP FEU-MCNC: 2.2 UG/ML FEU (ref 0–0.5)
DIFFERENTIAL METHOD BLD: ABNORMAL
EOSINOPHIL # BLD AUTO: 0 10E9/L (ref 0–0.7)
EOSINOPHIL NFR BLD AUTO: 0 %
ERYTHROCYTE [DISTWIDTH] IN BLOOD BY AUTOMATED COUNT: 13.2 % (ref 10–15)
FERRITIN SERPL-MCNC: 1687 NG/ML (ref 26–388)
FIBRINOGEN PPP-MCNC: 559 MG/DL (ref 200–420)
GFR SERPL CREATININE-BSD FRML MDRD: 83 ML/MIN/{1.73_M2}
GLUCOSE BLDC GLUCOMTR-MCNC: 109 MG/DL (ref 70–99)
GLUCOSE BLDC GLUCOMTR-MCNC: 80 MG/DL (ref 70–99)
GLUCOSE BLDC GLUCOMTR-MCNC: 80 MG/DL (ref 70–99)
GLUCOSE BLDC GLUCOMTR-MCNC: 84 MG/DL (ref 70–99)
GLUCOSE BLDC GLUCOMTR-MCNC: 84 MG/DL (ref 70–99)
GLUCOSE BLDC GLUCOMTR-MCNC: 89 MG/DL (ref 70–99)
GLUCOSE SERPL-MCNC: 99 MG/DL (ref 70–99)
HCO3 BLD-SCNC: 23 MMOL/L (ref 21–28)
HCO3 BLD-SCNC: 24 MMOL/L (ref 21–28)
HCO3 BLD-SCNC: 24 MMOL/L (ref 21–28)
HCO3 BLD-SCNC: 25 MMOL/L (ref 21–28)
HCO3 BLD-SCNC: 25 MMOL/L (ref 21–28)
HCT VFR BLD AUTO: 37.5 % (ref 40–53)
HGB BLD-MCNC: 12.9 G/DL (ref 13.3–17.7)
INTERPRETATION ECG - MUSE: NORMAL
LDH SERPL L TO P-CCNC: 556 U/L (ref 85–227)
LYMPHOCYTES # BLD AUTO: 0.9 10E9/L (ref 0.8–5.3)
LYMPHOCYTES NFR BLD AUTO: 15.7 %
MAGNESIUM SERPL-MCNC: 2 MG/DL (ref 1.6–2.3)
MCH RBC QN AUTO: 31.5 PG (ref 26.5–33)
MCHC RBC AUTO-ENTMCNC: 34.4 G/DL (ref 31.5–36.5)
MCV RBC AUTO: 92 FL (ref 78–100)
MONOCYTES # BLD AUTO: 0 10E9/L (ref 0–1.3)
MONOCYTES NFR BLD AUTO: 0 %
NEUTROPHILS # BLD AUTO: 4.7 10E9/L (ref 1.6–8.3)
NEUTROPHILS NFR BLD AUTO: 84.3 %
O2/TOTAL GAS SETTING VFR VENT: 100 %
O2/TOTAL GAS SETTING VFR VENT: 50 %
O2/TOTAL GAS SETTING VFR VENT: 50 %
O2/TOTAL GAS SETTING VFR VENT: 70 %
O2/TOTAL GAS SETTING VFR VENT: 80 %
OXYHGB MFR BLD: 95 % (ref 92–100)
PCO2 BLD: 36 MM HG (ref 35–45)
PCO2 BLD: 36 MM HG (ref 35–45)
PCO2 BLD: 37 MM HG (ref 35–45)
PCO2 BLD: 38 MM HG (ref 35–45)
PCO2 BLD: 39 MM HG (ref 35–45)
PH BLD: 7.39 PH (ref 7.35–7.45)
PH BLD: 7.42 PH (ref 7.35–7.45)
PH BLD: 7.44 PH (ref 7.35–7.45)
PH BLD: 7.44 PH (ref 7.35–7.45)
PH BLD: 7.45 PH (ref 7.35–7.45)
PHOSPHATE SERPL-MCNC: 3 MG/DL (ref 2.5–4.5)
PLATELET # BLD AUTO: 101 10E9/L (ref 150–450)
PLATELET # BLD EST: ABNORMAL 10*3/UL
PO2 BLD: 137 MM HG (ref 80–105)
PO2 BLD: 213 MM HG (ref 80–105)
PO2 BLD: 78 MM HG (ref 80–105)
PO2 BLD: 85 MM HG (ref 80–105)
PO2 BLD: 90 MM HG (ref 80–105)
POTASSIUM SERPL-SCNC: 3 MMOL/L (ref 3.4–5.3)
POTASSIUM SERPL-SCNC: 4 MMOL/L (ref 3.4–5.3)
PROT SERPL-MCNC: 5.4 G/DL (ref 6.8–8.8)
RBC # BLD AUTO: 4.1 10E12/L (ref 4.4–5.9)
RBC MORPH BLD: NORMAL
SODIUM SERPL-SCNC: 135 MMOL/L (ref 133–144)
WBC # BLD AUTO: 5.6 10E9/L (ref 4–11)

## 2020-03-26 PROCEDURE — 25000128 H RX IP 250 OP 636: Performed by: STUDENT IN AN ORGANIZED HEALTH CARE EDUCATION/TRAINING PROGRAM

## 2020-03-26 PROCEDURE — 25000132 ZZH RX MED GY IP 250 OP 250 PS 637: Mod: GY | Performed by: STUDENT IN AN ORGANIZED HEALTH CARE EDUCATION/TRAINING PROGRAM

## 2020-03-26 PROCEDURE — 25000125 ZZHC RX 250: Performed by: STUDENT IN AN ORGANIZED HEALTH CARE EDUCATION/TRAINING PROGRAM

## 2020-03-26 PROCEDURE — 82550 ASSAY OF CK (CPK): CPT | Performed by: STUDENT IN AN ORGANIZED HEALTH CARE EDUCATION/TRAINING PROGRAM

## 2020-03-26 PROCEDURE — 25000132 ZZH RX MED GY IP 250 OP 250 PS 637: Mod: GY | Performed by: NURSE PRACTITIONER

## 2020-03-26 PROCEDURE — 85025 COMPLETE CBC W/AUTO DIFF WBC: CPT | Performed by: STUDENT IN AN ORGANIZED HEALTH CARE EDUCATION/TRAINING PROGRAM

## 2020-03-26 PROCEDURE — 84100 ASSAY OF PHOSPHORUS: CPT | Performed by: STUDENT IN AN ORGANIZED HEALTH CARE EDUCATION/TRAINING PROGRAM

## 2020-03-26 PROCEDURE — 00000146 ZZHCL STATISTIC GLUCOSE BY METER IP

## 2020-03-26 PROCEDURE — 27210436 ZZH NUTRITION PRODUCT SEMIELEM INTERMED CAN

## 2020-03-26 PROCEDURE — 82805 BLOOD GASES W/O2 SATURATION: CPT | Performed by: STUDENT IN AN ORGANIZED HEALTH CARE EDUCATION/TRAINING PROGRAM

## 2020-03-26 PROCEDURE — 25000125 ZZHC RX 250: Performed by: NURSE PRACTITIONER

## 2020-03-26 PROCEDURE — 93325 DOPPLER ECHO COLOR FLOW MAPG: CPT | Mod: 26 | Performed by: INTERNAL MEDICINE

## 2020-03-26 PROCEDURE — 20000004 ZZH R&B ICU UMMC

## 2020-03-26 PROCEDURE — 25000128 H RX IP 250 OP 636: Performed by: NURSE PRACTITIONER

## 2020-03-26 PROCEDURE — 25000132 ZZH RX MED GY IP 250 OP 250 PS 637: Mod: GY | Performed by: INTERNAL MEDICINE

## 2020-03-26 PROCEDURE — 83735 ASSAY OF MAGNESIUM: CPT | Performed by: STUDENT IN AN ORGANIZED HEALTH CARE EDUCATION/TRAINING PROGRAM

## 2020-03-26 PROCEDURE — 82728 ASSAY OF FERRITIN: CPT | Performed by: STUDENT IN AN ORGANIZED HEALTH CARE EDUCATION/TRAINING PROGRAM

## 2020-03-26 PROCEDURE — 93321 DOPPLER ECHO F-UP/LMTD STD: CPT | Mod: 26 | Performed by: INTERNAL MEDICINE

## 2020-03-26 PROCEDURE — 85384 FIBRINOGEN ACTIVITY: CPT | Performed by: INTERNAL MEDICINE

## 2020-03-26 PROCEDURE — 86140 C-REACTIVE PROTEIN: CPT | Performed by: STUDENT IN AN ORGANIZED HEALTH CARE EDUCATION/TRAINING PROGRAM

## 2020-03-26 PROCEDURE — 40000275 ZZH STATISTIC RCP TIME EA 10 MIN

## 2020-03-26 PROCEDURE — 94003 VENT MGMT INPAT SUBQ DAY: CPT

## 2020-03-26 PROCEDURE — 82803 BLOOD GASES ANY COMBINATION: CPT | Performed by: STUDENT IN AN ORGANIZED HEALTH CARE EDUCATION/TRAINING PROGRAM

## 2020-03-26 PROCEDURE — 25800030 ZZH RX IP 258 OP 636: Performed by: STUDENT IN AN ORGANIZED HEALTH CARE EDUCATION/TRAINING PROGRAM

## 2020-03-26 PROCEDURE — 80053 COMPREHEN METABOLIC PANEL: CPT | Performed by: STUDENT IN AN ORGANIZED HEALTH CARE EDUCATION/TRAINING PROGRAM

## 2020-03-26 PROCEDURE — 84132 ASSAY OF SERUM POTASSIUM: CPT | Performed by: INTERNAL MEDICINE

## 2020-03-26 PROCEDURE — 71045 X-RAY EXAM CHEST 1 VIEW: CPT

## 2020-03-26 PROCEDURE — 99291 CRITICAL CARE FIRST HOUR: CPT | Mod: GC | Performed by: INTERNAL MEDICINE

## 2020-03-26 PROCEDURE — 93308 TTE F-UP OR LMTD: CPT | Mod: 26 | Performed by: INTERNAL MEDICINE

## 2020-03-26 PROCEDURE — 93325 DOPPLER ECHO COLOR FLOW MAPG: CPT

## 2020-03-26 RX ORDER — ASPIRIN 81 MG/1
81 TABLET, CHEWABLE ORAL DAILY
Status: ON HOLD | COMMUNITY
End: 2020-04-03

## 2020-03-26 RX ORDER — SILDENAFIL CITRATE 20 MG/1
40-100 TABLET ORAL PRN
Status: ON HOLD | COMMUNITY
End: 2020-04-03

## 2020-03-26 RX ORDER — AMOXICILLIN 250 MG
1 CAPSULE ORAL 2 TIMES DAILY
Status: DISCONTINUED | OUTPATIENT
Start: 2020-03-26 | End: 2020-04-01

## 2020-03-26 RX ORDER — POLYETHYLENE GLYCOL 3350 17 G/17G
17 POWDER, FOR SOLUTION ORAL DAILY
Status: DISCONTINUED | OUTPATIENT
Start: 2020-03-26 | End: 2020-03-28

## 2020-03-26 RX ORDER — HYDROCHLOROTHIAZIDE 12.5 MG/1
25 TABLET ORAL DAILY
Status: ON HOLD | COMMUNITY
End: 2020-04-03

## 2020-03-26 RX ORDER — CALCIUM POLYCARBOPHIL 625 MG 625 MG/1
1 TABLET ORAL 2 TIMES DAILY
Status: ON HOLD | COMMUNITY
End: 2020-04-03

## 2020-03-26 RX ORDER — FENTANYL CITRATE 50 UG/ML
0-50 INJECTION, SOLUTION INTRAMUSCULAR; INTRAVENOUS
Status: DISCONTINUED | OUTPATIENT
Start: 2020-03-26 | End: 2020-04-02

## 2020-03-26 RX ORDER — AMLODIPINE BESYLATE 5 MG/1
5 TABLET ORAL DAILY
Status: ON HOLD | COMMUNITY
End: 2020-04-03

## 2020-03-26 RX ORDER — NIACIN 500 MG
500 TABLET ORAL 2 TIMES DAILY
Status: ON HOLD | COMMUNITY
End: 2020-04-03

## 2020-03-26 RX ORDER — MULTIVITAMIN,THERAPEUTIC
1 TABLET ORAL DAILY
Status: ON HOLD | COMMUNITY
End: 2020-04-03

## 2020-03-26 RX ORDER — TAMSULOSIN HYDROCHLORIDE 0.4 MG/1
0.4 CAPSULE ORAL DAILY
Status: ON HOLD | COMMUNITY
End: 2020-04-03

## 2020-03-26 RX ORDER — AMOXICILLIN 250 MG
2 CAPSULE ORAL 2 TIMES DAILY
Status: DISCONTINUED | OUTPATIENT
Start: 2020-03-26 | End: 2020-04-01

## 2020-03-26 RX ORDER — ASPIRIN 81 MG
100 TABLET, DELAYED RELEASE (ENTERIC COATED) ORAL 2 TIMES DAILY
Status: ON HOLD | COMMUNITY
End: 2020-04-03

## 2020-03-26 RX ORDER — DEXTROSE MONOHYDRATE 100 MG/ML
INJECTION, SOLUTION INTRAVENOUS CONTINUOUS PRN
Status: DISCONTINUED | OUTPATIENT
Start: 2020-03-26 | End: 2020-04-04 | Stop reason: HOSPADM

## 2020-03-26 RX ADMIN — MIDAZOLAM (PF) 1 MG/ML IN 0.9 % SODIUM CHLORIDE INTRAVENOUS SOLUTION 2 MG/HR: at 09:45

## 2020-03-26 RX ADMIN — Medication: at 10:16

## 2020-03-26 RX ADMIN — CEFTRIAXONE SODIUM 2 G: 2 INJECTION, POWDER, FOR SOLUTION INTRAMUSCULAR; INTRAVENOUS at 19:51

## 2020-03-26 RX ADMIN — PROPOFOL 50 MCG/KG/MIN: 10 INJECTION, EMULSION INTRAVENOUS at 09:56

## 2020-03-26 RX ADMIN — MIDAZOLAM 2 MG: 1 INJECTION INTRAMUSCULAR; INTRAVENOUS at 16:00

## 2020-03-26 RX ADMIN — DOXAZOSIN 1 MG: 1 TABLET ORAL at 07:36

## 2020-03-26 RX ADMIN — MIDAZOLAM (PF) 1 MG/ML IN 0.9 % SODIUM CHLORIDE INTRAVENOUS SOLUTION 8 MG/HR: at 19:53

## 2020-03-26 RX ADMIN — AZITHROMYCIN MONOHYDRATE 250 MG: 500 INJECTION, POWDER, LYOPHILIZED, FOR SOLUTION INTRAVENOUS at 20:26

## 2020-03-26 RX ADMIN — POLYETHYLENE GLYCOL 3350 17 G: 17 POWDER, FOR SOLUTION ORAL at 09:46

## 2020-03-26 RX ADMIN — MIDAZOLAM 2 MG: 1 INJECTION INTRAMUSCULAR; INTRAVENOUS at 17:23

## 2020-03-26 RX ADMIN — POTASSIUM CHLORIDE 20 MEQ: 1.5 POWDER, FOR SOLUTION ORAL at 07:54

## 2020-03-26 RX ADMIN — FENTANYL CITRATE 25 MCG: 50 INJECTION, SOLUTION INTRAMUSCULAR; INTRAVENOUS at 17:23

## 2020-03-26 RX ADMIN — PROPOFOL 60 MCG/KG/MIN: 10 INJECTION, EMULSION INTRAVENOUS at 07:36

## 2020-03-26 RX ADMIN — FAMOTIDINE 20 MG: 20 TABLET ORAL at 07:36

## 2020-03-26 RX ADMIN — DEXTROSE MONOHYDRATE 3 MCG/KG/MIN: 50 INJECTION, SOLUTION INTRAVENOUS at 07:54

## 2020-03-26 RX ADMIN — DEXTROSE MONOHYDRATE 3 MCG/KG/MIN: 50 INJECTION, SOLUTION INTRAVENOUS at 21:42

## 2020-03-26 RX ADMIN — ENOXAPARIN SODIUM 40 MG: 40 INJECTION SUBCUTANEOUS at 15:51

## 2020-03-26 RX ADMIN — Medication: at 20:04

## 2020-03-26 RX ADMIN — SENNOSIDES AND DOCUSATE SODIUM 1 TABLET: 8.6; 5 TABLET ORAL at 09:46

## 2020-03-26 RX ADMIN — Medication: at 15:51

## 2020-03-26 RX ADMIN — Medication 100 MCG/HR: at 23:29

## 2020-03-26 RX ADMIN — POTASSIUM CHLORIDE 40 MEQ: 1.5 POWDER, FOR SOLUTION ORAL at 06:24

## 2020-03-26 RX ADMIN — SENNOSIDES AND DOCUSATE SODIUM 1 TABLET: 8.6; 5 TABLET ORAL at 20:01

## 2020-03-26 RX ADMIN — FAMOTIDINE 20 MG: 20 TABLET ORAL at 20:01

## 2020-03-26 RX ADMIN — MULTIVITAMIN 15 ML: LIQUID ORAL at 15:51

## 2020-03-26 RX ADMIN — FENTANYL CITRATE 25 MCG: 50 INJECTION, SOLUTION INTRAMUSCULAR; INTRAVENOUS at 10:17

## 2020-03-26 RX ADMIN — PROPOFOL 60 MCG/KG/MIN: 10 INJECTION, EMULSION INTRAVENOUS at 04:33

## 2020-03-26 RX ADMIN — MIDAZOLAM 2 MG: 1 INJECTION INTRAMUSCULAR; INTRAVENOUS at 11:35

## 2020-03-26 ASSESSMENT — ACTIVITIES OF DAILY LIVING (ADL)
ADLS_ACUITY_SCORE: 15
ADLS_ACUITY_SCORE: 13
ADLS_ACUITY_SCORE: 15
ADLS_ACUITY_SCORE: 15

## 2020-03-26 NOTE — PROGRESS NOTES
[facility]  CRITICAL CARE STAFF NOTE  03/25/20      Brief patient summary:  Mr. Eduardo Kemp is a 71yo male with a history of HTN, HL, and BPH who was diagnosed with COVID19 via nasal swab on 3/23 at [facility], and transferred to [facility].  Intubated 3/25 and transferred to [facility], enrolled in remdesivir clinical trial.     Interim history:  Stable transfer.  Vent settings unchanged, hemodynamics stable.     Acute critical care issues and supportive interventions managed by me today include:     1. Neuro: propofol + fentanyl for sedation.  Synchronous with the ventilator.     2. Pulm: ARDS 2/2 COVID-19.  P/F is ~128 on 60% FiO2.  Pplat 24.  Given general stability, ability to tolerate more PEEP, hold off proning for now. Has R. brachial art line placed at [place].      3. CV: stable, no pressors    4. Renal: avoiding nephrotoxins.  No HARVEY    5. GI: start nutrition in the next 24 hours    6. ID: remdesivir trial.  Ceftri + azithro for possible CAP.       Rest per resident note from today.    The patient was seen and examined with the resident/fellow physician.  We have discussed the patient in detail and I agree with the findings, assessment, and plan as documented when this note was cosigned on this day. The plan was formulated in conjunction with pharmacy, ICU nurses, and respiratory therapist. I have evaluated all laboratory values and imaging studies for the past 24 hours. I have reviewed all the consults that have been ordered and are active for this patient.      Critical Care Time: 30 min.  I spent this time (excluding procedures) personally providing and directing critical care services at the bedside and on the critical care unit.      Sally Riley MD   of Medicine  Department of Pulmonary, Allergy, Critical Care and Sleep Medicine   [facility]  Pager: [phone]

## 2020-03-26 NOTE — PHARMACY-ADMISSION MEDICATION HISTORY
Admission medication history interview status for the 3/25/2020 admission is complete. See Epic admission navigator for allergy information, pharmacy, prior to admission medications and immunization status.     Medication history interview sources:  EPIC ONLY - information from annual wellness visit 06/28/19    Changes made to PTA medication list (reason)  Added: all    Additional medication history information (including reliability of information, actions taken by pharmacist):None      Prior to Admission medications    Medication Sig Last Dose Taking? Auth Provider   amLODIPine (NORVASC) 5 MG tablet Take 5 mg by mouth daily  Yes Unknown, Entered By History   aspirin (ASA) 81 MG chewable tablet Take 81 mg by mouth daily  Yes Unknown, Entered By History   calcium polycarbophil (FIBERCON) 625 MG tablet Take 1 tablet by mouth 2 times daily  Yes Unknown, Entered By History   docusate sodium (COLACE) 100 MG tablet Take 100 mg by mouth 2 times daily  Yes Unknown, Entered By History   Glucos-MSM-C-Oj-Urcbau-Ylfxeg (GLUCOSAMINE MSM COMPLEX) TABS tablet Take 1 tablet by mouth daily  Yes Unknown, Entered By History   hydrochlorothiazide (HYDRODIURIL) 12.5 MG tablet Take 25 mg by mouth daily  Yes Unknown, Entered By History   multivitamin, therapeutic (THERA-VIT) TABS tablet Take 1 tablet by mouth daily  Yes Unknown, Entered By History   niacin 500 MG tablet Take 500 mg by mouth 2 times daily  Yes Unknown, Entered By History   sildenafil (REVATIO) 20 MG tablet Take  mg by mouth as needed (as needed for ED)  Yes Unknown, Entered By History   tamsulosin (FLOMAX) 0.4 MG capsule Take 0.4 mg by mouth daily  Yes Unknown, Entered By History         Medication history completed by: Tosha Jones, BalaD

## 2020-03-26 NOTE — PHARMACY-CONSULT NOTE
Pharmacy Tube Feeding Consult    Medication reviewed for administration by feeding tube and for potential food/drug interactions.    Recommendation: No changes are needed at this time.     Pharmacy will continue to follow as new medications are ordered.   Bala MackD

## 2020-03-26 NOTE — PROGRESS NOTES
Patient Pronned with head turned to his right. ETT adjusted to the left to take pressure off lips. Patient inline suctioning done.

## 2020-03-26 NOTE — PROGRESS NOTES
MICU CROSSCOVER NOTE    Patient continued to require higher FiO2 overnight - up to 85% with O2 sat in low 90s. His PF ratio was 128 around 2pm 3/25/2020. His current vent setting is , RR 20, PEEP 12, FiO2 85.     Discussed with Dr. Reynoso, plan as below  - get STAT ABG, add on LDH, D-dimer, CRP  - change vent setting to  ml (his ideal body weight is 146 lbs ~66 kg), increase RR to 25, ABG in 1 hour after vent setting change  - lasix 40 mg IV x 1 dose  - add versed for sedation, increase RAAS goal to -4 to -5,   - paralyze with vecuronium 10 mg IV push then cistacurium gtt  - prone   - TTE to assess RV function, appreciate assistance from namrata Dozier fellow on-call  - might add Velitri later     Addendum:  Discussed with Dr. Toribio, namrata fellow on-call about bedside TTE, overall the quality of the images was limited, however, LV and RV function appeared to be decreased. Discussed with Dr. Reynoso, patient might benefit from velitri if ABG not improve with paralyze and proning.     Floresita Mittal MD  PGY-3 Internal Medicine  Pager [phone]     remote mariann lyn

## 2020-03-26 NOTE — PLAN OF CARE
ICU End of Shift Summary. See flowsheets for vital signs and detailed assessment.    Changes this shift: Respiratory needs increased, pt proned and paralyzed with push vec and followed with cis gtt at 0015. Pt responded well to treatment, able to titrate fiO2 to 50%. CMV/25/400/12. No ETT secretions. Prop and fent running. BIS 30-40. Febrile 100-101 overnight. K+ replaced this AM. Pressure points protected as able. Given 1x dose of lasix given with good urine output. Wife updated by provider.    Plan:  Continue pronation, promote lung function      Problem: Cardiac Disease Comorbidity  Goal: Cardiac Disease  Description: Patient comorbidity will be monitored for signs and symptoms of Cardiac Disease.  Problems will be absent, minimized or managed by discharge/transition of care.  Outcome: No Change

## 2020-03-26 NOTE — PROGRESS NOTES
[facility]  CRITICAL CARE STAFF NOTE  03/26/20      Brief patient summary:  Mr. Eduardo Kemp is a 71yo male with a history of HTN, HL, and BPH who was diagnosed with COVID19 via nasal swab on 3/23 at [facility], and transferred to [facility].  Intubated 3/25 and transferred to [facility], enrolled in remdesivir clinical trial.  Clinically worsened overnight, now requiring proning and paralytic.     Interim history:  Overnight, FiO2 increased to 100%.  Paralyzed and proned with improved oxygenation.     Acute critical care issues and supportive interventions managed by me today include:     1. Neuro: versed, fentanyl to facilitate paralytic and mechanical ventilation.     2. Pulm: ARDS 2/2 COVID-19.   RR 20, Vt 400, PEEP12, FiO2 50%, proned and paralyzed.  Last P/F in this state was 180.  Supine at 6pm.  If P/F on these settings is >150 will maintain supine, followed by scaling back paralytic if able.  Trend CRP, ferritin.     3. CV: stable, no pressors.  Trend trops. TTE suggests globally depressed cardiac function.     4. Renal: avoiding nephrotoxins.  No HARVEY    5. GI: start nutrition in the next 24 hours    6. ID: remdesivir trial.  Ceftri + azithro for possible CAP.       Rest per resident note from today.    The patient was seen and examined with the resident/fellow physician.  We have discussed the patient in detail and I agree with the findings, assessment, and plan as documented when this note was cosigned on this day. The plan was formulated in conjunction with pharmacy, ICU nurses, and respiratory therapist. I have evaluated all laboratory values and imaging studies for the past 24 hours. I have reviewed all the consults that have been ordered and are active for this patient.      Critical Care Time: 30 min.  I spent this time (excluding procedures) personally providing and directing critical care services at the bedside and on the critical care unit.      Sally Riley  MD   of Medicine  Department of Pulmonary, Allergy, Critical Care and Sleep Medicine   [facility]  Pager: [phone]

## 2020-03-26 NOTE — PROGRESS NOTES
MEDICAL ICU PROGRESS NOTE  03/26/2020      Date of Service (when I saw the patient): 03/26/2020    ASSESSMENT: Eduardo Kemp is a 75 YO M with a h/o HTN who was admitted for AHRF and developed ARDS 2/2 COVID-19 (positive on 03/23/20 at an OSH) and transferred to [facility] on 03/25/20 for participation in the Remdesivir clinical trial. He remains intubated and sedated, and was paralyzed and proned last night.    CHANGES and MAJOR THINGS TODAY:   - Transition from Propofol to Versed gtt with Versed prn  - plan to reverse patient from prone to supine at 6 pm, get ABG at that that time and check P:F ratio  - Add artificial eye ointment  - Trickle feed (10 mL/hr)  - Insert fatima  - Bowel regimen added  - Space out BG checks    PLAN:    NEURO/PSYCH  # Sedation  - Transition from Propofol gtt -> Versed gtt today  - Versed prn     # Pain  - Fentanyl gtt, and fentanyl prn     # Paralytics  S/p Vecuronium.  - Cistracurium gtt  - Consider stopping paralytics tomorrow if able to keep supine tonight and he does well    # Acute encephalopathy 2/2 sedatives  Patient initially presented weak following a syncopal event at home. A CT Head on 3/23 at OSH showed no acute intracranial hemorrhage or acute abnormality of the brain, and mild diffuse cerebral atrophy and chronic white matter ischemic change. Currently sedated on propofol.  - continue sedation     CARDIOVASCULAR  # Hemodynamics  Stable but softer pressures. May be due to Propofol. Plan to take off of Propofol today.      # Bradycardia  Cardiology at OSH consulted. Tele without evidence of pauses or heart block. Per cards note, bradycardia may be due to hypoxia, apnea with sleep and increased vagal tone, and his infection. TTE yesterday showing mild LV reduction 40-45%, global RV function reduction.  - telemetry     # Decreased RV function per TTE  Likely d/t ARDS.   - treat ARDS (below)    # Elevated Troponin  Initial trop <0.056 -> 0.056 this AM. Trop on arrival 0.036. EKG on  arrival showing sinus bradycardia  - Will check trop as part of COVID labs q48 hrs     # H/o HTN  -Hold PTA amlodipine 5 mg   -Hold PTA hydrochlorothiazide 12.5 mg daily     PULMONARY  # ARDS  # Acute Hypoxic Respiratory Failure 2/2 COVID-19   Patient with increased FiO2 need overnight. Was proned and paralyzed at midnight on 03/25/20. Vent settings changed to  and rate 25. ABG this AM notable for pH 7.44, pCO2 37, pO2 137, FiO2 70%. P:F ratio in 196.   - ABG at 6 pm when rotating patient  - if P:F >150 then consider not proning again tonight  - if patient tolerates supine overnight, then consider stopping paralytic  - artifical tears  Ventilation Mode: CMV/AC  (Continuous Mandatory Ventilation/ Assist Control)  FiO2 (%): 50 %  Rate Set (breaths/minute): 25 breaths/min  Tidal Volume Set (mL): 400 mL  PEEP (cm H2O): 12 cmH2O  Oxygen Concentration (%): 50 %  Resp: 25    GI  # Bowel Regimen  - scheduled Senna and Miralax      # Nutrition: Orders Placed This Encounter      NPO for Medical/Clinical Reasons Except for: NPO but receiving Tube Feeding  - nutrition consulted 03/26/20 for trickle feeds while prone     # Stress Ulcer prophylaxis: Famotidine     RENAL  # Hypokalemia  K 3.0 this AM, replaced per protocol.  - K replacement protocol  - Mg and Phosph replacement protocol  - BMP     # Hyponatremia, resolved  Patient treated for hypovolemia at OSH in setting of diarrhea and diuresis on HCTX.   - track I's and O's  - BMP      # Hypocalcemia, resolved  iCa on arrival 3.4. S/p 2 g Calcium gluconate.  - repeat iCa  - replete prn     # BPH  -PTA on tamsulosin 0.4 mg daily; hold     # Volume - Euvolemic  S/p IVF at OSH in setting of initial dehydration.     HEME/ONC  # Pancytopenia  - trend CBC     # Transfusion History: none     ENDOCRINE  BG stable. HgbA1c 5.2.  - BG checks QID     INFECTIOUS DISEASE  # Sepsis  # COVID-19 Infection  Initially had extensive infectious work-up at OSH. C Diff neg, enteric panel neg.  Flu A and B neg. Strep pneumo and Legionella neg. UA neg for LE and nitrite. 1 of 2 Blood cultures on 03/23 grew Staph Epidermidis. CXR showing bilateral pulmonary infiltrates. COVID testing positive on 03/25/20. OSH discussed with RACHELL. Elevated D-dimer (443 at OSH, normal 0-230), , normal WBC with lymphopenia 0.63. BUN 12 and Cre 0.77, ALT 27, AST 47, AP 37.  - Infectious disease consulted  - Remdesivir study, consented family over phone, pharmacy aware and will randomize treatment  - Contact, droplet, airborne precautions  - continue checking COVID labs: trop, CRP, ferritin q48 hrs  - trend CBC with diff, CMP, CK,  - Blood cultures 03/26 NGTD after 1 day  - no Tylenol or NSAIDS for fevers     # Antimicrobials  Ceftriaxone 2 g IV, 5 day course, duration until 03/27  Azithromycine 250 mg every day, 5 day course, duration until 03/27     # Cultures  - Blood culture 03/25/20: NGTD     SKIN/MSK  PT and OT consult     Krzysztof Owens MD  Internal Medicine, PGY-1  MICU-2, [id number]    General Cares/Prophylaxis:    DVT Prophylaxis: Enoxaparin (Lovenox) SQ  GI Prophylaxis: PPI  Restraints: none  Family Communication: wife    Lines/tubes/drains:  RIJ and R brachial artery               ETT, OG     Disposition:  - Medical ICU     Patient seen and findings/plan discussed with medical ICU staff, Dr. Riley.    Krzysztof Owens Jr., MD  Internal Medicine, PGY-1  MICU-2  [id number]    ====================================  INTERVAL HISTORY:   Overnight patient proned and paralyzed. Wife updated. Also had TTE.    OBJECTIVE:   1. VITAL SIGNS:   Temp:  [99.9  F (37.7  C)-102.7  F (39.3  C)] 99.9  F (37.7  C)  Heart Rate:  [51-63] 54  Resp:  [20-28] 25  BP: (117)/(63) 117/63  MAP:  [59 mmHg-91 mmHg] 83 mmHg  Arterial Line BP: ()/(40-64) 123/58  FiO2 (%):  [50 %-100 %] 50 %  SpO2:  [89 %-100 %] 100 %  Ventilation Mode: CMV/AC  (Continuous Mandatory Ventilation/ Assist Control)  FiO2 (%): 50 %  Rate Set (breaths/minute): 25  breaths/min  Tidal Volume Set (mL): 400 mL  PEEP (cm H2O): 12 cmH2O  Oxygen Concentration (%): 50 %  Resp: 25    2. INTAKE/ OUTPUT:   I/O last 3 completed shifts:  In: 1943.21 [I.V.:1838.21; NG/GT:105]  Out: 2285 [Urine:2285]    3. PHYSICAL EXAMINATION:  General: proned man in bed  HEENT: NC, AT, MMM  Neuro: sedated, paralyzed  Pulm/Resp: did not auscultate  CV: bradycardic-low normal rate, normal rhythm on tele  Abdomen: did not evaluate  : did not evaluate  Incisions/Skin: did not evaluate    4. LABS:   Ferritin: 1687  CRP: 250 (from 180)  D-dimer: 2.2 (from 2.4)    Arterial Blood Gases   Recent Labs   Lab 03/26/20  0741 03/26/20  0427 03/26/20  0120 03/25/20  2337   PH 7.45 7.44 7.44 7.40   PCO2 36 37 36 38   PO2 90 137* 213* 75*   HCO3 25 25 24 23     Complete Blood Count   Recent Labs   Lab 03/26/20  0427 03/25/20  1447   WBC 5.6 4.5   HGB 12.9* 12.5*   * 82*     Basic Metabolic Panel  Recent Labs   Lab 03/26/20  0427 03/25/20  1447 03/25/20  1446    135  --    POTASSIUM 3.0* 3.6 2.5*   CHLORIDE 105 106  --    CO2 24 25  --    BUN 11 13  --    CR 0.90 0.89  --    GLC 99 86  --      Liver Function Tests  Recent Labs   Lab 03/26/20  0427 03/25/20  1447   AST 56* 49*   ALT 29 28   ALKPHOS 41 39*   BILITOTAL 0.3 0.4   ALBUMIN 2.0* 2.1*   INR  --  1.11     Pancreatic Enzymes  No lab results found in last 7 days.  Coagulation Profile  Recent Labs   Lab 03/25/20  1447   INR 1.11     5. RADIOLOGY:   Recent Results (from the past 24 hour(s))   XR Chest Port 1 View    Narrative    Exam: Chest x-ray, 1 view, 3/25/2020 4:14 PM    Indication: Check placement of endotracheal tube.     Comparison: None    Findings:   Single frontal radiograph of the chest. Tip of the ET tube projects  roughly 3.5 cm above the abe. Enteric tube tip projects over the  stomach. Right IJ central venous catheter tip projects over the mid  SVC. No pneumothorax. Left pleural effusion. Bilateral diffuse  interstitial and airspace  opacities with basilar predominance.  Cardiomediastinal silhouette is obscured. Retrocardiac opacity.  Degenerative changes in the thoracic spine.    Impression    Impression:   1. Tip of endotracheal tube projects 4.2 cm above the abe.  2. Left pleural effusion with associated atelectasis.  3. Bilateral diffuse interstitial and airspace opacities with basilar  predominance, most concerning for infection rather than edema.  4. Retrocardiac opacity, edema versus infection versus atelectasis.    I have personally reviewed the examination and initial interpretation  and I agree with the findings.    VIRGEN GORMAN MD   XR Abdomen Port 1 View    Narrative    EXAMINATION:  XR ABDOMEN PORT 1 VW 3/25/2020 4:14 PM     COMPARISON: none..    HISTORY: Check NG/OG tube placement    TECHNIQUE: Frontal view of the abdomen.    FINDINGS: Gastric tube tip and proximal sidehole project over the left  upper quadrant. Partially visualized ureteral catheter in the pelvis.  No abnormally dilated loops of bowel.. No free air, pneumatosis or  portal venous gas. No acute skeletal abnormality.      Impression    IMPRESSION: Gastric tube tip in proximal sidehole project over the  left upper quadrant. Nonobstructive bowel gas pattern.    I have personally reviewed the examination and initial interpretation  and I agree with the findings.    KELSEY TUCKER MD   Echocardiogram Limited    Narrative    [id number]  [id number]  [id number]  [id number]^NII^DENZEL^BERTHA           [facility],[place]  Echocardiography Laboratory  [address] [place] [id number]     Name: MAYRA CORTEZ  MRN: [id number]  : 1945  Study Date: 2020 12:28 AM  Age: 74 yrs  Gender: Male  Patient Location: [facility]  Reason For Study: Shock  Ordering Physician: DENZEL TORIBIO  Referring Physician: ERNESTO DELACRUZ  Performed By: Denzel Toribio     _____________________________________________________________________________  __         Procedure  Limited Portable Echo Adult. Technically difficult study. Poor acoustic  windows.  _____________________________________________________________________________  __        Interpretation Summary  Limited TTE for function in patient with COVID-19 and ARDS.  Mildly (EF 40-45%) reduced left ventricular function is present.  The right ventricle is normal size.  Global right ventricular function is normal.  Unable to assess mean RA pressure given the patient is on a ventilator.  No significant valvular disease by limited Doppler interrogation.  No pericardial effusion is present.     There is no prior study for direct comparison.  _____________________________________________________________________________  __        Left Ventricle  Left ventricular size is normal. Left ventricular wall thickness cannot  evaluate. Mildly (EF 40-45%) reduced left ventricular function is present.  Mild diffuse hypokinesis is present.     Right Ventricle  The right ventricle is normal size. Global right ventricular function is  normal.     Atria  The atria cannot be assessed.     Mitral Valve  The mitral valve is normal.     Aortic Valve  The aortic valve is tricuspid. Mild aortic valve calcification is present. On  Doppler interrogation, there is no significant stenosis or regurgitation.        Tricuspid Valve  The tricuspid valve is normal. Trace tricuspid insufficiency is present.     Vessels  The aorta root is normal. Unable to assess mean RA pressure given the patient  is on a ventilator.     Pericardium  No pericardial effusion is present.     Compared to Previous Study  There is no prior study for direct comparison.     Attestation  I have personally viewed the imaging and agree with the interpretation and  report as documented by the fellow, Earl Toribio, and/or edited by me.     _____________________________________________________________________________  __                       Report approved by: Addy LI  03/26/2020 08:40 AM                 _____________________________________________________________________________  __        03/25/20 TTE  Mildly (EF 40-45%) reduced left ventricular function is present.  Global right ventricular function is mildly reduced.  Unable to assess mean RA pressure given the patient is on a ventilator.  No pericardial effusion is present.  There is no prior study for direct comparison.    =========================================

## 2020-03-26 NOTE — PLAN OF CARE
ICU End of Shift Summary. See flowsheets for vital signs and detailed assessment.    Changes this shift: Temps slowly increasing t/o shift, T max 100.8. Propofol discontinued and Versed started, Fentanyl increased to 100 mcg/hr. BP higher since Versed started. Trickle feeds started. Bowel regimen started, no BM noted yet.     Plan: Supine at 1800, check ABG 1 hour post.       Problem: ARDS (Acute Respiratory Distress Syndrome)  Goal: Effective Oxygenation  3/26/2020 1639 by Latonia Klein RN  Outcome: No Change  3/26/2020 0636 by Shira Martin RN  Outcome: Declining     Problem: Cardiac Disease Comorbidity  Goal: Cardiac Disease  Description: Patient comorbidity will be monitored for signs and symptoms of Cardiac Disease.  Problems will be absent, minimized or managed by discharge/transition of care.  3/26/2020 1639 by Latonia Klein, RN  Outcome: No Change  3/26/2020 0636 by Shira Martin RN  Outcome: No Change

## 2020-03-26 NOTE — PROGRESS NOTES
"CLINICAL NUTRITION SERVICES - ASSESSMENT NOTE     Nutrition Prescription    RECOMMENDATIONS FOR MDs/PROVIDERS TO ORDER:  Recommend begin advancing to goal enteral nutrition support regimen (see below) as soon as medically appropriate.     Malnutrition Status:    Unable to determine due to patient is currently positive for COVID-19 and unable to obtain in-person nutrition history or nutrition focused physical assessment (NFPA) from patient as the number of staff going into rooms is restricted to limit exposure and to minimize use of PPE.    Recommendations already ordered by Registered Dietitian (RD):  Impact Peptide 1.5 @ 10 mL/hr trickle feeds given pt is proned - do not advance  - Weekly CRP inflammation lab with immunomodulating formula   - 30 mL q4hr fluid flushes for tube patency. Additional fluids and/or adjustments per MD.    - Multivitamin/mineral (15 mL/day via FT) to help ensure micronutrient needs being met with suspected hypermetabolic demands and potential interruptions to TF infusions.  - Aspiration precautions with gastric feeds    Future/Additional Recommendations:  1. Monitor tolerance to gastric trickle feeds while prone.    2. Once able to advance to goal TF rate, rec:  Impact Peptide @ goal 60 ml/hr (1440 ml/day) to provide 2160 kcals (27 kcal/kg/day), 135 g PRO (1.7 g/kg/day), 1109 ml free H2O, 92 g Fat (50% from MCTs), 202 g CHO and no Fiber daily.  - Advance by 10 mL q8hr as tolerated  - Do not start or advance unless lytes (Mg++/K+) >/= WNL and phos>1.9   - Aspiration precautions with gastric feeds       REASON FOR ASSESSMENT  Eduardo Kemp is a/an 74 year old male assessed by the dietitian for Provider Order - Registered Dietitian to Assess and Order TF per Medical Nutrition Therapy Protocol  -->Comment: Start trickle feed while prone     Per H&P: \"PMH of HTN, HLD, and BPH who is transferring from [facility] ICU to [facility] ICU for Remdesivir trial.\"     NUTRITION HISTORY  -Not previously " "seen by clinical nutrition services.   -No food allergies noted  -Unable to obtain nutrition history d/t pt inbutated and COVID positive    CURRENT NUTRITION ORDERS  Diet: NPO  Intake/Tolerance: N/A    LABS  Labs reviewed  -Na+ 135 (WNL)  -K+ 3.0 (L)  -Mg++ 2.0 (WNL)  -Phos 3.0 (WNL)  -CRP inflammation 250 (H)  -T bili 0.3 (WNL)  -BUN/Cr WNL    MEDICATIONS  Medications reviewed  -Lasix  -Bowel regimen (Miralax daily + senna-docusate BID)  -Remdesivir study    ANTHROPOMETRICS  Height: Per Care Everywhere:  171.5 cm (5' 7.5\") 06/28/2019 1:46 PM CDT   Most Recent Weight: 108.5 kg (239 lb 3.2 oz)    IBW: 70 kg (155% IBW)  BMI: 36.89 kg/m2; Obesity Grade II BMI 35-39.9  Weight History: Limited wt history in Epic and Care Everywhere. ESTELLA recent wt trends, although current wt is up (+20 lbs) compared to wt ~9 months ago. Hypovolemia noted at   Wt Readings from Last 20 Encounters:   03/25/20 108.5 kg (239 lb 3.2 oz)     Per Care Everywhere:  99.6 kg (219 lb 9.6 oz) 06/28/2019 1:46 PM CDT     101.9 kg (224 lb 11.2 oz) 06/15/2018 10:08 AM CDT     Dosing Weight: 80 kg (adjusted, based on admit wt of 108.5 kg on 3/25 and IBW of 70 kg)    ASSESSED NUTRITION NEEDS  Estimated Energy Needs: 0470-2117 kcals/day (25 - 30 kcals/kg)  Justification: Maintenance  Estimated Protein Needs:  grams protein/day (1.2 - 1.5 grams of pro/kg)  Justification: Hypercatabolism with critical illness  Estimated Fluid Needs: 1 mL/kcal  Justification: Maintenance or Per provider pending fluid status    PHYSICAL FINDINGS  See malnutrition section below.  -Pt proned  -No skin breakdown noted per RN flowsheets  -OGT (AXR- \"Gastric tube tip in proximal sidehole project over the left upper quadrant.\") - ok to use per team     MALNUTRITION  % Intake: Unable to assess**  % Weight Loss: Unable to assess**  Subcutaneous Fat Loss: Unable to assess**  Muscle Loss: Unable to assess**  Fluid Accumulation/Edema: None noted per chart review  Malnutrition " Diagnosis: **Unable to determine due to patient is currently positive for COVID-19 and unable to obtain in-person nutrition history or nutrition focused physical assessment (NFPA) from patient as the number of staff going into rooms is restricted to limit exposure and to minimize use of PPE.    NUTRITION DIAGNOSIS  Inadequate oral intake related to NPO status in setting of intubation as evidenced by MD consult for RD to start trickle feeds d/t pt proned.       INTERVENTIONS  Implementation  -Nutrition Education: Unable to provide education d/t pt intubated and COVID positive  -Enteral Nutrition - Initiate trickle feeds  -Feeding tube flush   -Multivitamin/mineral supplementation    Goals  Adv to goal nutrition support within 2-3 days.     Monitoring/Evaluation  Progress toward goals will be monitored and evaluated per protocol.    Shireen Pelayo RD, LD  Pager: [phone]

## 2020-03-27 LAB
ALBUMIN SERPL-MCNC: 1.8 G/DL (ref 3.4–5)
ALP SERPL-CCNC: 41 U/L (ref 40–150)
ALT SERPL W P-5'-P-CCNC: 25 U/L (ref 0–70)
ANION GAP SERPL CALCULATED.3IONS-SCNC: 6 MMOL/L (ref 3–14)
AST SERPL W P-5'-P-CCNC: 51 U/L (ref 0–45)
BASE EXCESS BLDA CALC-SCNC: 0 MMOL/L
BASE EXCESS BLDA CALC-SCNC: 0.2 MMOL/L
BASE EXCESS BLDA CALC-SCNC: 2.3 MMOL/L
BASE EXCESS BLDA CALC-SCNC: 2.4 MMOL/L
BASE EXCESS BLDA CALC-SCNC: 2.5 MMOL/L
BASOPHILS # BLD AUTO: 0 10E9/L (ref 0–0.2)
BASOPHILS NFR BLD AUTO: 0 %
BILIRUB SERPL-MCNC: 0.3 MG/DL (ref 0.2–1.3)
BUN SERPL-MCNC: 14 MG/DL (ref 7–30)
BURR CELLS BLD QL SMEAR: SLIGHT
CALCIUM SERPL-MCNC: 7.4 MG/DL (ref 8.5–10.1)
CHLORIDE SERPL-SCNC: 107 MMOL/L (ref 94–109)
CK SERPL-CCNC: 185 U/L (ref 30–300)
CO2 SERPL-SCNC: 25 MMOL/L (ref 20–32)
CREAT SERPL-MCNC: 0.74 MG/DL (ref 0.66–1.25)
DIFFERENTIAL METHOD BLD: ABNORMAL
EOSINOPHIL # BLD AUTO: 0 10E9/L (ref 0–0.7)
EOSINOPHIL NFR BLD AUTO: 0 %
ERYTHROCYTE [DISTWIDTH] IN BLOOD BY AUTOMATED COUNT: 13.5 % (ref 10–15)
GFR SERPL CREATININE-BSD FRML MDRD: >90 ML/MIN/{1.73_M2}
GLUCOSE BLDC GLUCOMTR-MCNC: 103 MG/DL (ref 70–99)
GLUCOSE BLDC GLUCOMTR-MCNC: 92 MG/DL (ref 70–99)
GLUCOSE BLDC GLUCOMTR-MCNC: 97 MG/DL (ref 70–99)
GLUCOSE SERPL-MCNC: 100 MG/DL (ref 70–99)
HCO3 BLD-SCNC: 25 MMOL/L (ref 21–28)
HCO3 BLD-SCNC: 25 MMOL/L (ref 21–28)
HCO3 BLD-SCNC: 27 MMOL/L (ref 21–28)
HCO3 BLD-SCNC: 27 MMOL/L (ref 21–28)
HCO3 BLD-SCNC: 28 MMOL/L (ref 21–28)
HCT VFR BLD AUTO: 38.4 % (ref 40–53)
HGB BLD-MCNC: 13 G/DL (ref 13.3–17.7)
LYMPHOCYTES # BLD AUTO: 0.2 10E9/L (ref 0.8–5.3)
LYMPHOCYTES NFR BLD AUTO: 3.4 %
MCH RBC QN AUTO: 31 PG (ref 26.5–33)
MCHC RBC AUTO-ENTMCNC: 33.9 G/DL (ref 31.5–36.5)
MCV RBC AUTO: 92 FL (ref 78–100)
MONOCYTES # BLD AUTO: 0.1 10E9/L (ref 0–1.3)
MONOCYTES NFR BLD AUTO: 1.7 %
NEUTROPHILS # BLD AUTO: 4.9 10E9/L (ref 1.6–8.3)
NEUTROPHILS NFR BLD AUTO: 94.9 %
O2/TOTAL GAS SETTING VFR VENT: 100 %
O2/TOTAL GAS SETTING VFR VENT: 50 %
O2/TOTAL GAS SETTING VFR VENT: 50 %
O2/TOTAL GAS SETTING VFR VENT: 60 %
O2/TOTAL GAS SETTING VFR VENT: 70 %
PCO2 BLD: 39 MM HG (ref 35–45)
PCO2 BLD: 40 MM HG (ref 35–45)
PCO2 BLD: 40 MM HG (ref 35–45)
PCO2 BLD: 43 MM HG (ref 35–45)
PCO2 BLD: 43 MM HG (ref 35–45)
PH BLD: 7.4 PH (ref 7.35–7.45)
PH BLD: 7.41 PH (ref 7.35–7.45)
PH BLD: 7.44 PH (ref 7.35–7.45)
PLATELET # BLD AUTO: 119 10E9/L (ref 150–450)
PO2 BLD: 56 MM HG (ref 80–105)
PO2 BLD: 59 MM HG (ref 80–105)
PO2 BLD: 79 MM HG (ref 80–105)
PO2 BLD: 96 MM HG (ref 80–105)
PO2 BLD: 97 MM HG (ref 80–105)
POIKILOCYTOSIS BLD QL SMEAR: SLIGHT
POTASSIUM SERPL-SCNC: 3.4 MMOL/L (ref 3.4–5.3)
PROT SERPL-MCNC: 5.2 G/DL (ref 6.8–8.8)
RBC # BLD AUTO: 4.19 10E12/L (ref 4.4–5.9)
SODIUM SERPL-SCNC: 138 MMOL/L (ref 133–144)
WBC # BLD AUTO: 5.2 10E9/L (ref 4–11)

## 2020-03-27 PROCEDURE — 25000132 ZZH RX MED GY IP 250 OP 250 PS 637: Mod: GY | Performed by: STUDENT IN AN ORGANIZED HEALTH CARE EDUCATION/TRAINING PROGRAM

## 2020-03-27 PROCEDURE — 25000132 ZZH RX MED GY IP 250 OP 250 PS 637: Mod: GY | Performed by: NURSE PRACTITIONER

## 2020-03-27 PROCEDURE — 25000128 H RX IP 250 OP 636: Performed by: NURSE PRACTITIONER

## 2020-03-27 PROCEDURE — 25000128 H RX IP 250 OP 636: Performed by: STUDENT IN AN ORGANIZED HEALTH CARE EDUCATION/TRAINING PROGRAM

## 2020-03-27 PROCEDURE — 80053 COMPREHEN METABOLIC PANEL: CPT | Performed by: STUDENT IN AN ORGANIZED HEALTH CARE EDUCATION/TRAINING PROGRAM

## 2020-03-27 PROCEDURE — 25000125 ZZHC RX 250: Performed by: STUDENT IN AN ORGANIZED HEALTH CARE EDUCATION/TRAINING PROGRAM

## 2020-03-27 PROCEDURE — 99291 CRITICAL CARE FIRST HOUR: CPT | Mod: GC | Performed by: INTERNAL MEDICINE

## 2020-03-27 PROCEDURE — 25000128 H RX IP 250 OP 636: Performed by: INTERNAL MEDICINE

## 2020-03-27 PROCEDURE — 85025 COMPLETE CBC W/AUTO DIFF WBC: CPT | Performed by: STUDENT IN AN ORGANIZED HEALTH CARE EDUCATION/TRAINING PROGRAM

## 2020-03-27 PROCEDURE — 94003 VENT MGMT INPAT SUBQ DAY: CPT

## 2020-03-27 PROCEDURE — 82550 ASSAY OF CK (CPK): CPT | Performed by: STUDENT IN AN ORGANIZED HEALTH CARE EDUCATION/TRAINING PROGRAM

## 2020-03-27 PROCEDURE — 25000132 ZZH RX MED GY IP 250 OP 250 PS 637: Mod: GY | Performed by: INTERNAL MEDICINE

## 2020-03-27 PROCEDURE — 82803 BLOOD GASES ANY COMBINATION: CPT | Performed by: STUDENT IN AN ORGANIZED HEALTH CARE EDUCATION/TRAINING PROGRAM

## 2020-03-27 PROCEDURE — 25800030 ZZH RX IP 258 OP 636: Performed by: STUDENT IN AN ORGANIZED HEALTH CARE EDUCATION/TRAINING PROGRAM

## 2020-03-27 PROCEDURE — 27210437 ZZH NUTRITION PRODUCT SEMIELEM INTERMED LITER

## 2020-03-27 PROCEDURE — 40000275 ZZH STATISTIC RCP TIME EA 10 MIN

## 2020-03-27 PROCEDURE — 00000146 ZZHCL STATISTIC GLUCOSE BY METER IP

## 2020-03-27 PROCEDURE — 20000004 ZZH R&B ICU UMMC

## 2020-03-27 RX ORDER — POTASSIUM CHLORIDE 7.45 MG/ML
10 INJECTION INTRAVENOUS
Status: DISCONTINUED | OUTPATIENT
Start: 2020-03-27 | End: 2020-04-04 | Stop reason: HOSPADM

## 2020-03-27 RX ORDER — POTASSIUM CHLORIDE 1.5 G/1.58G
20-40 POWDER, FOR SOLUTION ORAL
Status: DISCONTINUED | OUTPATIENT
Start: 2020-03-27 | End: 2020-04-04 | Stop reason: HOSPADM

## 2020-03-27 RX ORDER — POTASSIUM CHLORIDE 750 MG/1
20-40 TABLET, EXTENDED RELEASE ORAL
Status: DISCONTINUED | OUTPATIENT
Start: 2020-03-27 | End: 2020-04-04 | Stop reason: HOSPADM

## 2020-03-27 RX ORDER — POTASSIUM CL/LIDO/0.9 % NACL 10MEQ/0.1L
10 INTRAVENOUS SOLUTION, PIGGYBACK (ML) INTRAVENOUS
Status: DISCONTINUED | OUTPATIENT
Start: 2020-03-27 | End: 2020-04-04 | Stop reason: HOSPADM

## 2020-03-27 RX ORDER — POTASSIUM CHLORIDE 29.8 MG/ML
20 INJECTION INTRAVENOUS
Status: DISCONTINUED | OUTPATIENT
Start: 2020-03-27 | End: 2020-04-04 | Stop reason: HOSPADM

## 2020-03-27 RX ADMIN — DEXTROSE MONOHYDRATE 3 MCG/KG/MIN: 50 INJECTION, SOLUTION INTRAVENOUS at 07:46

## 2020-03-27 RX ADMIN — Medication: at 08:13

## 2020-03-27 RX ADMIN — CEFTRIAXONE SODIUM 2 G: 2 INJECTION, POWDER, FOR SOLUTION INTRAMUSCULAR; INTRAVENOUS at 19:49

## 2020-03-27 RX ADMIN — SENNOSIDES AND DOCUSATE SODIUM 2 TABLET: 8.6; 5 TABLET ORAL at 19:51

## 2020-03-27 RX ADMIN — POTASSIUM CHLORIDE 20 MEQ: 1.5 POWDER, FOR SOLUTION ORAL at 11:06

## 2020-03-27 RX ADMIN — MULTIVITAMIN 15 ML: LIQUID ORAL at 07:47

## 2020-03-27 RX ADMIN — DOXAZOSIN 1 MG: 1 TABLET ORAL at 07:48

## 2020-03-27 RX ADMIN — ENOXAPARIN SODIUM 40 MG: 40 INJECTION SUBCUTANEOUS at 14:58

## 2020-03-27 RX ADMIN — MIDAZOLAM 2 MG: 1 INJECTION INTRAMUSCULAR; INTRAVENOUS at 12:58

## 2020-03-27 RX ADMIN — AZITHROMYCIN MONOHYDRATE 250 MG: 500 INJECTION, POWDER, LYOPHILIZED, FOR SOLUTION INTRAVENOUS at 20:43

## 2020-03-27 RX ADMIN — DEXTROSE MONOHYDRATE 3 MCG/KG/MIN: 50 INJECTION, SOLUTION INTRAVENOUS at 18:18

## 2020-03-27 RX ADMIN — FAMOTIDINE 20 MG: 20 TABLET ORAL at 07:47

## 2020-03-27 RX ADMIN — MIDAZOLAM (PF) 1 MG/ML IN 0.9 % SODIUM CHLORIDE INTRAVENOUS SOLUTION 8 MG/HR: at 18:18

## 2020-03-27 RX ADMIN — FAMOTIDINE 20 MG: 20 TABLET ORAL at 19:49

## 2020-03-27 RX ADMIN — SENNOSIDES AND DOCUSATE SODIUM 1 TABLET: 8.6; 5 TABLET ORAL at 07:47

## 2020-03-27 RX ADMIN — MIDAZOLAM (PF) 1 MG/ML IN 0.9 % SODIUM CHLORIDE INTRAVENOUS SOLUTION 8 MG/HR: at 08:18

## 2020-03-27 RX ADMIN — Medication: at 20:05

## 2020-03-27 RX ADMIN — MIDAZOLAM 2 MG: 1 INJECTION INTRAMUSCULAR; INTRAVENOUS at 12:26

## 2020-03-27 RX ADMIN — Medication: at 14:58

## 2020-03-27 RX ADMIN — POLYETHYLENE GLYCOL 3350 17 G: 17 POWDER, FOR SOLUTION ORAL at 07:47

## 2020-03-27 RX ADMIN — Medication: at 02:02

## 2020-03-27 ASSESSMENT — ACTIVITIES OF DAILY LIVING (ADL)
ADLS_ACUITY_SCORE: 15

## 2020-03-27 NOTE — PLAN OF CARE
Due to current medical status, patient not appropriate for physical therapy today. Will hold and re-assess daily as appropriate.

## 2020-03-27 NOTE — PROGRESS NOTES
[facility]  CRITICAL CARE STAFF NOTE  03/27/20      Brief patient summary:  Mr. Eduardo Kemp is a 69yo male with a history of HTN, HL, and BPH who was diagnosed with COVID19 via nasal swab on 3/23 at [facility], and transferred to [facility].  Intubated 3/25 and transferred to [facility], enrolled in remdesivir clinical trial.  Clinically worsened overnight, now requiring proning and paralytic.     Interim history:  O2 requirements increased while supine; reproned again at midnight with improved oxygenation.  Desaturations with turning this morning requiring increased FiO2.  Does better prone at the moment.     Acute critical care issues and supportive interventions managed by me today include:     1. Neuro: versed, fentanyl to facilitate paralytic and mechanical ventilation.  Continue.     2. Pulm: ARDS 2/2 COVID-19. Unable to tolerate remaining supine and desaturating with repositioning.  Lung mechanics on vent settings 20/400/12/50 and prone PIP=28, Pplat=21.  No flolan.      3. CV: stable, no pressors.  Trend trops. TTE suggests globally depressed cardiac function.     4. Renal: avoiding nephrotoxins.  No HARVEY presently.     5. GI: start nutrition in the next 24 hours, NJ placement today while supine.     6. ID: remdesivir trial.  Ceftri + azithro for possible CAP.       Rest per resident note from today.    The patient was seen and examined with the resident/fellow physician.  We have discussed the patient in detail and I agree with the findings, assessment, and plan as documented when this note was cosigned on this day. The plan was formulated in conjunction with pharmacy, ICU nurses, and respiratory therapist. I have evaluated all laboratory values and imaging studies for the past 24 hours. I have reviewed all the consults that have been ordered and are active for this patient.      Critical Care Time: 30 min.  I spent this time (excluding procedures) personally providing and directing  critical care services at the bedside and on the critical care unit.      Sally Riley MD   of Medicine  Department of Pulmonary, Allergy, Critical Care and Sleep Medicine   [facility]  Pager: [phone]

## 2020-03-27 NOTE — PLAN OF CARE
ICU End of Shift Summary. See flowsheets for vital signs and detailed assessment.    Changes this shift: Remains sedated and paralyzed, attempted to wean paralytic however dysynchronous and ABG worsening. Paralytic restarted. 50% FiO2, PEEP 12 and proned majority of shift. Supinated at 1800, requiring 100% FiO2 to maintain sats. ABG sent at this time. PEEP increased to 15.    Plan: Continue supportive treatment. Redrawn ABG after increase in PEEP, re-prone at 0000 or earlier if no resp improvement

## 2020-03-27 NOTE — PLAN OF CARE
OT: Due to current medical status, patient not appropriate for occupational therapy today. Will hold and re-assess daily as appropriate.

## 2020-03-27 NOTE — PLAN OF CARE
ICU End of Shift Summary. See flowsheets for vital signs and detailed assessment.    Changes this shift: Pt proned at 0015. Able to titrate FiO2 to 50%. BPs stable. Fent and versed unchanged. Cis increased to 3, 2/4 twitches on TOF. Trickle feeds while proned. Al with good urine output. Pressure points reduced as able while in prone position. No BM, meds given.     Plan:  Optimize lung function      Problem: Cardiac Disease Comorbidity  Goal: Cardiac Disease  Description: Patient comorbidity will be monitored for signs and symptoms of Cardiac Disease.  Problems will be absent, minimized or managed by discharge/transition of care.  3/27/2020 0547 by Shira Martin, RN  Outcome: No Change     Problem: ARDS (Acute Respiratory Distress Syndrome)  Goal: Effective Oxygenation  3/27/2020 0547 by Shira Martin, RN  Outcome: No Change

## 2020-03-27 NOTE — PROGRESS NOTES
MEDICAL ICU PROGRESS NOTE  03/27/2020      Date of Service (when I saw the patient): 03/27/2020    ASSESSMENT: Eduardo Kemp is a 73 YO M with a h/o HTN who was admitted for AHRF and developed ARDS 2/2 COVID-19 (positive on 03/23/20 at an OSH) and transferred to [facility] on 03/25/20 for participation in the Remdesivir clinical trial. He remains intubated, sedated, paralyzed and proned. P:F worse yesterday while supine, and patient re-proned overnight.    CHANGES and MAJOR THINGS TODAY:   - ABG at 5:30 pm; supine at 6pm; repeat ABG at 6:30 pm  - increase K replacement protocol  - wean off paralytic    PLAN:    NEURO/PSYCH  # Sedation  - Versed gtt, Versed prn     # Pain  - Fentanyl gtt, and fentanyl prn     # Paralytics  S/p Vecuronium.  - Cistracurium gtt    # Acute encephalopathy 2/2 sedatives  Patient initially presented weak following a syncopal event at home. A CT Head on 3/23 at OSH showed no acute intracranial hemorrhage or acute abnormality of the brain, and mild diffuse cerebral atrophy and chronic white matter ischemic change. Currently sedated on propofol.  - continue sedation     CARDIOVASCULAR  # Hemodynamics  Stable.      # Bradycardia  Cardiology at OSH consulted. Tele without evidence of pauses or heart block. Per cards note, bradycardia may be due to hypoxia, apnea with sleep and increased vagal tone, and his infection. TTE showing mild LV reduction 40-45%, global RV function reduction.  - telemetry     # Decreased RV function per TTE  Likely d/t ARDS.   - treat ARDS (below)    # Elevated Troponin  Initial trop <0.056 -> 0.056 this AM. Trop on arrival 0.036. EKG on arrival showing sinus bradycardia  - Will check trop as part of COVID labs q48 hrs (next tomorrow)     # H/o HTN  -Hold PTA amlodipine 5 mg   -Hold PTA hydrochlorothiazide 12.5 mg daily     PULMONARY  # ARDS  # Acute Hypoxic Respiratory Failure 2/2 COVID-19   Proned and paralyzed.   Ventilation Mode: CMV/AC  (Continuous Mandatory  Ventilation/ Assist Control)  FiO2 (%): 50 %  Rate Set (breaths/minute): 25 breaths/min  Tidal Volume Set (mL): 400 mL  PEEP (cm H2O): 12 cmH2O  Oxygen Concentration (%): 60 %  Resp: 25  P:F 160   Plateau Pressure: see Dr. Riley note  - ABG at 6 pm when rotating patient  - if P:F >150 then consider not proning again tonight  - if patient tolerates supine overnight, then consider stopping paralytic  - artifical tears    GI  # Bowel Regimen  No BM in 24 hrs.  - Senna and Miralax      # Nutrition: Orders Placed This Encounter      NPO for Medical/Clinical Reasons Except for: NPO but receiving Tube Feeding  - nutrition consulted 03/26/20 for trickle feeds while prone     # Stress Ulcer prophylaxis: Famotidine     RENAL  # Hypokalemia  K 3.4 this AM, replaced per protocol.  - K replacement protocol; increase  - Mg and Phosph replacement protocol  - BMP     # BPH  -PTA on tamsulosin 0.4 mg daily; hold     # Volume - Euvolemic  S/p IVF at OSH in setting of initial dehydration.     HEME/ONC  # Pancytopenia  - trend CBC     # Transfusion History: none     ENDOCRINE  BG stable. HgbA1c 5.2.  - BG checks QID     INFECTIOUS DISEASE  # Sepsis  # COVID-19 Infection  Initially had extensive infectious work-up at OSH. C Diff neg, enteric panel neg. Flu A and B neg. Strep pneumo and Legionella neg. UA neg for LE and nitrite. 1 of 2 Blood cultures on 03/23 grew Staph Epidermidis. CXR showing bilateral pulmonary infiltrates. COVID testing positive on 03/25/20. OSH discussed with [facility].   - Infectious disease consulted; Remdesivir study participant  - Contact, droplet, airborne precautions  - continue checking COVID labs: trop, CRP, ferritin q48 hrs (next tomorrow)  - trend CBC with diff, CMP, CK,  - Blood cultures 03/26 NGTD after 1 day  - no Tylenol or NSAIDS for fevers     # Antimicrobials  Ceftriaxone 2 g IV, 5 day course, duration until 03/27  Azithromycine 250 mg every day, 5 day course, duration until 03/27     # Cultures  -  Blood culture 03/25/20: NGTD     SKIN/MSK  PT and OT consult     Krzysztof Owens MD  Internal Medicine, PGY-1  MICU-2, [phone]    General Cares/Prophylaxis:    DVT Prophylaxis: Enoxaparin (Lovenox) SQ  GI Prophylaxis: PPI  Restraints: none  Family Communication: wife    Lines/tubes/drains:  RIJ and R brachial artery               ETT, OG     Disposition:  - Medical ICU     Patient seen and findings/plan discussed with medical ICU staff, Dr. Riley.    Krzysztof Owens Jr., MD  Internal Medicine, PGY-1  MICU-2  [id number]    ====================================  INTERVAL HISTORY:   Afternoon P:F 97, proned at midnight. FiO2 titrated to 50%. P:F in AM was 160. O2 saturation dropped with movement. Fent and Versed continued. Cis increased to 3.    OBJECTIVE:   1. VITAL SIGNS:   Temp:  [99.1  F (37.3  C)-100.8  F (38.2  C)] 99.3  F (37.4  C)  Heart Rate:  [54-73] 63  Resp:  [25] 25  BP: (129)/(68) 129/68  MAP:  [26 mmHg-102 mmHg] 81 mmHg  Arterial Line BP: ()/(1-72) 120/56  FiO2 (%):  [50 %-100 %] 50 %  SpO2:  [88 %-100 %] 96 %  Ventilation Mode: CMV/AC  (Continuous Mandatory Ventilation/ Assist Control)  FiO2 (%): 50 %  Rate Set (breaths/minute): 25 breaths/min  Tidal Volume Set (mL): 400 mL  PEEP (cm H2O): 12 cmH2O  Oxygen Concentration (%): 60 %  Resp: 25    2. INTAKE/ OUTPUT:   I/O last 3 completed shifts:  In: 1965.98 [I.V.:1545.98; NG/GT:270]  Out: 1035 [Urine:1035]  Net 24 hrs: - 0.5 L  Since admit: + 0.5 L    Drips:  - Paralytic: Cis 3 mcg/kg/min  - Analgesia: Fentanyl 100 mcg/hr  - Sedation: 8 mL/hr    3. PHYSICAL EXAMINATION:  General: proned man in bed  HEENT: NC, AT, MMM  Neuro: sedated, paralyzed  Pulm/Resp: did not auscultate  CV: bradycardic-low normal rate, normal rhythm on tele  Abdomen: did not evaluate  : did not evaluate  Incisions/Skin: did not evaluate    4. LABS:   03/26 Ferritin: 1687  03/26 CRP: 250 (from 180)  03/26 D-dimer: 2.2 (from 2.4)    Arterial Blood Gases   Recent Labs   Lab  03/27/20  0411 03/27/20  0158 03/26/20  1951 03/26/20  1659   PH 7.40 7.41 7.42 7.39   PCO2 40 39 38 39   PO2 96 97 78* 85   HCO3 25 25 24 23     Complete Blood Count   Recent Labs   Lab 03/27/20  0412 03/26/20  0427 03/25/20  1447   WBC 5.2 5.6 4.5   HGB 13.0* 12.9* 12.5*   * 101* 82*     Basic Metabolic Panel  Recent Labs   Lab 03/27/20  0412 03/26/20  1147 03/26/20  0427 03/25/20  1447     --  135 135   POTASSIUM 3.4 4.0 3.0* 3.6   CHLORIDE 107  --  105 106   CO2 25  --  24 25   BUN 14  --  11 13   CR 0.74  --  0.90 0.89   *  --  99 86     Liver Function Tests  Recent Labs   Lab 03/27/20 0412 03/26/20  0427 03/25/20  1447   AST 51* 56* 49*   ALT 25 29 28   ALKPHOS 41 41 39*   BILITOTAL 0.3 0.3 0.4   ALBUMIN 1.8* 2.0* 2.1*   INR  --   --  1.11     Pancreatic Enzymes  No lab results found in last 7 days.  Coagulation Profile  Recent Labs   Lab 03/25/20  1447   INR 1.11     5. RADIOLOGY:   Recent Results (from the past 24 hour(s))   XR Chest Port 1 View    Narrative    Exam: XR CHEST PORT 1 VW, 3/26/2020 10:05 PM    Indication: Follow-up ARDS    Comparison: 3/25/2020    Findings:   Single frontal radiograph of the chest. Tip of the ET tube projects  mid trachea similar to prior. Gastric tube tip extends beyond field of  view; poorly visualized sidehole. Right IJ central venous catheter tip  projects over the mid SVC. No pneumothorax. Left pleural effusion and  retrocardiac opacity are not significantly changed. Bilateral diffuse  interstitial and airspace opacities with basilar predominance.  Cardiomediastinal silhouette remains obscured.       Impression    Impression:   1. Bilateral opacities are overall not substantially changed.    2. Left pleural effusion with associated atelectasis and/or  consolidation.  3. Support devices as above.    MARCELLA MOHAN MD     03/25/20 TTE  Mildly (EF 40-45%) reduced left ventricular function is present.  Global right ventricular function is mildly  reduced.  Unable to assess mean RA pressure given the patient is on a ventilator.  No pericardial effusion is present.  There is no prior study for direct comparison.    =========================================

## 2020-03-28 ENCOUNTER — APPOINTMENT (OUTPATIENT)
Dept: GENERAL RADIOLOGY | Facility: CLINIC | Age: 75
DRG: 870 | End: 2020-03-28
Attending: INTERNAL MEDICINE
Payer: MEDICARE

## 2020-03-28 LAB
ALBUMIN SERPL-MCNC: 1.6 G/DL (ref 3.4–5)
ALP SERPL-CCNC: 44 U/L (ref 40–150)
ALT SERPL W P-5'-P-CCNC: 37 U/L (ref 0–70)
ANION GAP SERPL CALCULATED.3IONS-SCNC: 5 MMOL/L (ref 3–14)
AST SERPL W P-5'-P-CCNC: 77 U/L (ref 0–45)
BASE EXCESS BLDA CALC-SCNC: 2.3 MMOL/L
BASE EXCESS BLDA CALC-SCNC: 2.8 MMOL/L
BASE EXCESS BLDA CALC-SCNC: 3.9 MMOL/L
BASE EXCESS BLDA CALC-SCNC: 3.9 MMOL/L
BASE EXCESS BLDA CALC-SCNC: 4.2 MMOL/L
BILIRUB DIRECT SERPL-MCNC: 0.1 MG/DL (ref 0–0.2)
BILIRUB SERPL-MCNC: 0.4 MG/DL (ref 0.2–1.3)
BUN SERPL-MCNC: 16 MG/DL (ref 7–30)
BUN SERPL-MCNC: 18 MG/DL (ref 7–30)
CALCIUM SERPL-MCNC: 7.6 MG/DL (ref 8.5–10.1)
CALCIUM SERPL-MCNC: 7.8 MG/DL (ref 8.5–10.1)
CHLORIDE SERPL-SCNC: 107 MMOL/L (ref 94–109)
CHLORIDE SERPL-SCNC: 108 MMOL/L (ref 94–109)
CK SERPL-CCNC: 104 U/L (ref 30–300)
CO2 SERPL-SCNC: 26 MMOL/L (ref 20–32)
CO2 SERPL-SCNC: 28 MMOL/L (ref 20–32)
CREAT SERPL-MCNC: 0.63 MG/DL (ref 0.66–1.25)
CREAT SERPL-MCNC: 0.7 MG/DL (ref 0.66–1.25)
CRP SERPL-MCNC: 260 MG/L (ref 0–8)
ERYTHROCYTE [DISTWIDTH] IN BLOOD BY AUTOMATED COUNT: 13.7 % (ref 10–15)
FERRITIN SERPL-MCNC: 2950 NG/ML (ref 26–388)
GFR SERPL CREATININE-BSD FRML MDRD: >90 ML/MIN/{1.73_M2}
GFR SERPL CREATININE-BSD FRML MDRD: >90 ML/MIN/{1.73_M2}
GLUCOSE BLDC GLUCOMTR-MCNC: 123 MG/DL (ref 70–99)
GLUCOSE BLDC GLUCOMTR-MCNC: 99 MG/DL (ref 70–99)
GLUCOSE SERPL-MCNC: 101 MG/DL (ref 70–99)
GLUCOSE SERPL-MCNC: 112 MG/DL (ref 70–99)
HCO3 BLD-SCNC: 27 MMOL/L (ref 21–28)
HCO3 BLD-SCNC: 28 MMOL/L (ref 21–28)
HCO3 BLD-SCNC: 28 MMOL/L (ref 21–28)
HCO3 BLD-SCNC: 29 MMOL/L (ref 21–28)
HCO3 BLD-SCNC: 29 MMOL/L (ref 21–28)
HCT VFR BLD AUTO: 38.6 % (ref 40–53)
HGB BLD-MCNC: 12.8 G/DL (ref 13.3–17.7)
MAGNESIUM SERPL-MCNC: 2.2 MG/DL (ref 1.6–2.3)
MCH RBC QN AUTO: 30.5 PG (ref 26.5–33)
MCHC RBC AUTO-ENTMCNC: 33.2 G/DL (ref 31.5–36.5)
MCV RBC AUTO: 92 FL (ref 78–100)
O2/TOTAL GAS SETTING VFR VENT: 100 %
O2/TOTAL GAS SETTING VFR VENT: 100 %
O2/TOTAL GAS SETTING VFR VENT: 50 %
O2/TOTAL GAS SETTING VFR VENT: 55 %
O2/TOTAL GAS SETTING VFR VENT: 75 %
PCO2 BLD: 41 MM HG (ref 35–45)
PCO2 BLD: 42 MM HG (ref 35–45)
PCO2 BLD: 43 MM HG (ref 35–45)
PH BLD: 7.41 PH (ref 7.35–7.45)
PH BLD: 7.42 PH (ref 7.35–7.45)
PH BLD: 7.44 PH (ref 7.35–7.45)
PH BLD: 7.44 PH (ref 7.35–7.45)
PH BLD: 7.45 PH (ref 7.35–7.45)
PHOSPHATE SERPL-MCNC: 2.4 MG/DL (ref 2.5–4.5)
PLATELET # BLD AUTO: 142 10E9/L (ref 150–450)
PLATELET # BLD AUTO: 142 10E9/L (ref 150–450)
PO2 BLD: 119 MM HG (ref 80–105)
PO2 BLD: 55 MM HG (ref 80–105)
PO2 BLD: 68 MM HG (ref 80–105)
PO2 BLD: 69 MM HG (ref 80–105)
PO2 BLD: 86 MM HG (ref 80–105)
POTASSIUM SERPL-SCNC: 3.4 MMOL/L (ref 3.4–5.3)
POTASSIUM SERPL-SCNC: 3.5 MMOL/L (ref 3.4–5.3)
PROT SERPL-MCNC: 5.3 G/DL (ref 6.8–8.8)
RBC # BLD AUTO: 4.19 10E12/L (ref 4.4–5.9)
SODIUM SERPL-SCNC: 139 MMOL/L (ref 133–144)
SODIUM SERPL-SCNC: 139 MMOL/L (ref 133–144)
TROPONIN I SERPL-MCNC: <0.015 UG/L (ref 0–0.04)
WBC # BLD AUTO: 5.3 10E9/L (ref 4–11)

## 2020-03-28 PROCEDURE — 25000128 H RX IP 250 OP 636: Performed by: NURSE PRACTITIONER

## 2020-03-28 PROCEDURE — 20000004 ZZH R&B ICU UMMC

## 2020-03-28 PROCEDURE — 84484 ASSAY OF TROPONIN QUANT: CPT | Performed by: INTERNAL MEDICINE

## 2020-03-28 PROCEDURE — 00000146 ZZHCL STATISTIC GLUCOSE BY METER IP

## 2020-03-28 PROCEDURE — 27210437 ZZH NUTRITION PRODUCT SEMIELEM INTERMED LITER

## 2020-03-28 PROCEDURE — 25000128 H RX IP 250 OP 636: Performed by: STUDENT IN AN ORGANIZED HEALTH CARE EDUCATION/TRAINING PROGRAM

## 2020-03-28 PROCEDURE — 25000132 ZZH RX MED GY IP 250 OP 250 PS 637: Mod: GY | Performed by: NURSE PRACTITIONER

## 2020-03-28 PROCEDURE — 80076 HEPATIC FUNCTION PANEL: CPT | Performed by: INTERNAL MEDICINE

## 2020-03-28 PROCEDURE — 40000275 ZZH STATISTIC RCP TIME EA 10 MIN

## 2020-03-28 PROCEDURE — 82550 ASSAY OF CK (CPK): CPT | Performed by: INTERNAL MEDICINE

## 2020-03-28 PROCEDURE — 80048 BASIC METABOLIC PNL TOTAL CA: CPT | Performed by: INTERNAL MEDICINE

## 2020-03-28 PROCEDURE — 86140 C-REACTIVE PROTEIN: CPT | Performed by: INTERNAL MEDICINE

## 2020-03-28 PROCEDURE — 44500 INTRO GASTROINTESTINAL TUBE: CPT | Performed by: DIETITIAN, REGISTERED

## 2020-03-28 PROCEDURE — 25000125 ZZHC RX 250: Performed by: STUDENT IN AN ORGANIZED HEALTH CARE EDUCATION/TRAINING PROGRAM

## 2020-03-28 PROCEDURE — 25000132 ZZH RX MED GY IP 250 OP 250 PS 637: Mod: GY | Performed by: STUDENT IN AN ORGANIZED HEALTH CARE EDUCATION/TRAINING PROGRAM

## 2020-03-28 PROCEDURE — 25800030 ZZH RX IP 258 OP 636: Performed by: STUDENT IN AN ORGANIZED HEALTH CARE EDUCATION/TRAINING PROGRAM

## 2020-03-28 PROCEDURE — 94003 VENT MGMT INPAT SUBQ DAY: CPT

## 2020-03-28 PROCEDURE — 82728 ASSAY OF FERRITIN: CPT | Performed by: INTERNAL MEDICINE

## 2020-03-28 PROCEDURE — 84100 ASSAY OF PHOSPHORUS: CPT | Performed by: INTERNAL MEDICINE

## 2020-03-28 PROCEDURE — 40000014 ZZH STATISTIC ARTERIAL MONITORING DAILY

## 2020-03-28 PROCEDURE — 85049 AUTOMATED PLATELET COUNT: CPT | Performed by: INTERNAL MEDICINE

## 2020-03-28 PROCEDURE — 25000132 ZZH RX MED GY IP 250 OP 250 PS 637: Mod: GY | Performed by: INTERNAL MEDICINE

## 2020-03-28 PROCEDURE — 80048 BASIC METABOLIC PNL TOTAL CA: CPT | Performed by: STUDENT IN AN ORGANIZED HEALTH CARE EDUCATION/TRAINING PROGRAM

## 2020-03-28 PROCEDURE — 82803 BLOOD GASES ANY COMBINATION: CPT | Performed by: STUDENT IN AN ORGANIZED HEALTH CARE EDUCATION/TRAINING PROGRAM

## 2020-03-28 PROCEDURE — 99291 CRITICAL CARE FIRST HOUR: CPT | Mod: GC | Performed by: PHYSICIAN ASSISTANT

## 2020-03-28 PROCEDURE — 25000128 H RX IP 250 OP 636: Performed by: INTERNAL MEDICINE

## 2020-03-28 PROCEDURE — 85027 COMPLETE CBC AUTOMATED: CPT | Performed by: STUDENT IN AN ORGANIZED HEALTH CARE EDUCATION/TRAINING PROGRAM

## 2020-03-28 PROCEDURE — 40000986 XR ABDOMEN PORT 1 VW

## 2020-03-28 PROCEDURE — 83735 ASSAY OF MAGNESIUM: CPT | Performed by: INTERNAL MEDICINE

## 2020-03-28 RX ORDER — BISACODYL 10 MG
10 SUPPOSITORY, RECTAL RECTAL DAILY PRN
Status: DISCONTINUED | OUTPATIENT
Start: 2020-03-28 | End: 2020-04-04 | Stop reason: HOSPADM

## 2020-03-28 RX ORDER — FUROSEMIDE 10 MG/ML
40 INJECTION INTRAMUSCULAR; INTRAVENOUS ONCE
Status: COMPLETED | OUTPATIENT
Start: 2020-03-28 | End: 2020-03-28

## 2020-03-28 RX ORDER — MAGNESIUM CARB/ALUMINUM HYDROX 105-160MG
296 TABLET,CHEWABLE ORAL ONCE
Status: COMPLETED | OUTPATIENT
Start: 2020-03-28 | End: 2020-03-28

## 2020-03-28 RX ORDER — POLYETHYLENE GLYCOL 3350 17 G/17G
17 POWDER, FOR SOLUTION ORAL 2 TIMES DAILY
Status: DISCONTINUED | OUTPATIENT
Start: 2020-03-28 | End: 2020-04-01

## 2020-03-28 RX ORDER — FUROSEMIDE 10 MG/ML
20 INJECTION INTRAMUSCULAR; INTRAVENOUS ONCE
Status: DISCONTINUED | OUTPATIENT
Start: 2020-03-28 | End: 2020-03-28

## 2020-03-28 RX ADMIN — DOXAZOSIN 1 MG: 1 TABLET ORAL at 07:57

## 2020-03-28 RX ADMIN — MULTIVITAMIN 15 ML: LIQUID ORAL at 07:57

## 2020-03-28 RX ADMIN — SENNOSIDES AND DOCUSATE SODIUM 2 TABLET: 8.6; 5 TABLET ORAL at 22:25

## 2020-03-28 RX ADMIN — DEXTROSE MONOHYDRATE 3 MCG/KG/MIN: 50 INJECTION, SOLUTION INTRAVENOUS at 16:13

## 2020-03-28 RX ADMIN — Medication: at 22:26

## 2020-03-28 RX ADMIN — POTASSIUM CHLORIDE 20 MEQ: 1.5 POWDER, FOR SOLUTION ORAL at 22:25

## 2020-03-28 RX ADMIN — Medication: at 03:20

## 2020-03-28 RX ADMIN — MAGNESIUM CITRATE 296 ML: 1.75 LIQUID ORAL at 12:30

## 2020-03-28 RX ADMIN — POLYETHYLENE GLYCOL 3350 17 G: 17 POWDER, FOR SOLUTION ORAL at 22:25

## 2020-03-28 RX ADMIN — FUROSEMIDE 40 MG: 10 INJECTION, SOLUTION INTRAMUSCULAR; INTRAVENOUS at 10:25

## 2020-03-28 RX ADMIN — FAMOTIDINE 20 MG: 20 TABLET ORAL at 07:57

## 2020-03-28 RX ADMIN — SENNOSIDES AND DOCUSATE SODIUM 1 TABLET: 8.6; 5 TABLET ORAL at 07:57

## 2020-03-28 RX ADMIN — Medication 100 MCG/HR: at 23:54

## 2020-03-28 RX ADMIN — FENTANYL CITRATE 50 MCG: 50 INJECTION, SOLUTION INTRAMUSCULAR; INTRAVENOUS at 10:44

## 2020-03-28 RX ADMIN — FUROSEMIDE 40 MG: 10 INJECTION, SOLUTION INTRAMUSCULAR; INTRAVENOUS at 19:49

## 2020-03-28 RX ADMIN — CEFTRIAXONE SODIUM 2 G: 2 INJECTION, POWDER, FOR SOLUTION INTRAMUSCULAR; INTRAVENOUS at 19:41

## 2020-03-28 RX ADMIN — ENOXAPARIN SODIUM 40 MG: 40 INJECTION SUBCUTANEOUS at 14:47

## 2020-03-28 RX ADMIN — MIDAZOLAM (PF) 1 MG/ML IN 0.9 % SODIUM CHLORIDE INTRAVENOUS SOLUTION 8 MG/HR: at 05:59

## 2020-03-28 RX ADMIN — Medication: at 08:02

## 2020-03-28 RX ADMIN — POLYETHYLENE GLYCOL 3350 17 G: 17 POWDER, FOR SOLUTION ORAL at 07:57

## 2020-03-28 RX ADMIN — MIDAZOLAM 2 MG: 1 INJECTION INTRAMUSCULAR; INTRAVENOUS at 10:46

## 2020-03-28 RX ADMIN — POTASSIUM CHLORIDE 20 MEQ: 29.8 INJECTION, SOLUTION INTRAVENOUS at 05:58

## 2020-03-28 RX ADMIN — FAMOTIDINE 20 MG: 20 TABLET ORAL at 22:25

## 2020-03-28 RX ADMIN — POTASSIUM CHLORIDE 20 MEQ: 1.5 POWDER, FOR SOLUTION ORAL at 08:00

## 2020-03-28 RX ADMIN — Medication: at 14:47

## 2020-03-28 RX ADMIN — SODIUM PHOSPHATE, MONOBASIC, MONOHYDRATE AND SODIUM PHOSPHATE, DIBASIC, ANHYDROUS 15 MMOL: 276; 142 INJECTION, SOLUTION INTRAVENOUS at 08:03

## 2020-03-28 RX ADMIN — Medication 100 MCG/HR: at 00:13

## 2020-03-28 RX ADMIN — DEXTROSE MONOHYDRATE 3 MCG/KG/MIN: 50 INJECTION, SOLUTION INTRAVENOUS at 05:58

## 2020-03-28 RX ADMIN — MIDAZOLAM (PF) 1 MG/ML IN 0.9 % SODIUM CHLORIDE INTRAVENOUS SOLUTION 8 MG/HR: at 18:51

## 2020-03-28 ASSESSMENT — ACTIVITIES OF DAILY LIVING (ADL)
ADLS_ACUITY_SCORE: 15

## 2020-03-28 NOTE — PLAN OF CARE
ICU End of Shift Summary. See flowsheets for vital signs and detailed assessment.    Changes this shift: Remains sedated and paralyzed. Proned until 1800. While proned PEEP decreased to 12 then 10, 50% FiO2. ABG drawn before supinating and after. Currently on 100% FiO2 after supinating. PEEP increased to 12. IV lasix given with good response. Mag citrate administered and bowel regimen increased, no BM yet. NJ placed at bedside when supine, awaiting xray verification.    Plan: Draw ABG after increasing PEEP and continue to monitor resp status closely. Re-prone at 0000. Awaiting xray verification of NJ tube.

## 2020-03-28 NOTE — PLAN OF CARE
ICU End of Shift Summary. See flowsheets for vital signs and detailed assessment.    Changes this shift: Reproned at midnight, was on 75% FiO2 prior to proning. Since being proned have been able to wean back to 60%. Remains on peep of 15. No changes to drips overnight. No BM. Urine output ~40ml/hr.     Plan: Continue to monitor, continue POC. Notify team of changes.

## 2020-03-28 NOTE — PROGRESS NOTES
[facility] ID Service: Brief note     Patient:  Eduardo Kemp  Date of birth 1945  Medical record number [id number]  Consult Requested by: MICU Team         Assessment and Recommendations:   Problem List:  1) COVID-19 - Dx 3/23/2020  2) Acute hypoxic respiratory failure - requiring ventilator , pronation , paralytic  3)  Bilateral pneumonia - CXR - Bilateral diffuse interstitial and airspace opacities with basilar predminance  4) Thrombocytopenia - improved   5) Elevated CK --> normalized   6) Minimally elevated troponin  7) Bradycardia  8) S. epidermidis in blood culture (3/23) -1/1 positive, neg x2/2 (3/25)   9) Hx of HTN - controlled   10. Elevated  (3/25) ---> 250 (3/26)     Recommendations:  - Remdesivir clinical trial labs and study drug per clinical research team  - check CRP, ferintin. IL-6, D-Dimer , LDH at serial intervals - monitoring for cytokine release syndrome  - monitor BNP, trop  - Continue ceftriaxone and Azithromycin  - monitor for signs of aspiration      Discussion:  Eduardo Kemp is a 74 year old male with HTN and remote history of tobacco use who was admitted to [facility] on 3/25 with COVID-19. Initially presented to [facility] on 3/23 with weakness, fever to 102.3, shortness of breath, and diarrhea for four days prior in setting of travel to [place]. He was subsequently admitted with COVID-19 testing sent on admission and he was started on cefdinir and azithromycin for possible LLL pneumonia. Early 3/25 he had respiratory decompensation with shortness of breath, increased work of breathing, and hypoxia requiring intubation following which he was transferred first to [facility] and then to [facility]. Notable laboratory abnormalities lymphocytopenia with jak  (3/23), thrombocytopenia with jak 68 (3/24),  (3/25), and troponin 0.056 (3/25).    He is currently enrolled in remdesivir clinical trial. Given bilateral  infiltrate and hypoxic respiratory  failure would complete antibiotic course for community acquired pneumonia as well. Appreciate supportive care measures provided by MICU team.    per RN, minimal tracheal secretion ( blood tinged), afebrile, currently in proning position     ID will continue to follow peripherally     Douglas Lake MD,M.Med.Sc.  Infectious Diseases  Pager: [phone]     Time spent reviewing chart : 15 min        History of Present Illness:   Eduardo Kemp is a 74 year old male with pertinent comorbid conditions of HTN who was admitted on 3/25/2020 as transfer for Covid-19 clinical trial and ID consulted for assistance with management.      Initially had symptoms of weakness and fatigue since travel to [place] about four days prior to admission at OSH. Seen on 3/21 in ED with dehydration and discharged then admitted to [facility] 3/23 with weakness, fever (102), shortness of breath and diarrhea at which time flu was negative, covid sent, and cxr with left lung base patchy infiltrate. At admission SpO2 was in 80s and temp 102.3 Concern for CAP and started on azithromycin and cefdinir. Admission labs notable for mild lymphopenia and thrombocytopenia as well as hyponatremia and slightly elevated creatinine. Blood cultures with 1/2 positive for GPC identified as Staph epi and considered contaminant. Did have bradycardia to 30s with plan for nonurgent TTE at some point from cardiology. Overnight 3/24 - 3/25 noted to become more short of breath around 0200 and initially placed on 9L O2 via oxy mask. ABG done and 7.4/33/33 and subsequently intubated around 3am then transferred to [facility] where arterial line and central line were placed. Ceftriaxone and azithromycin not continued upon arrival.           Review of Systems:   Complete ROS obtained, pertinent positives and negatives as above.       Past Medical and Surgical History:   HTN  BPH      Allergies:      Allergies   Allergen Reactions     Ace Inhibitors       Possible angioedema 7/15.            Current Antimicrobials:   Cefdinir 3/23 - 3/24  Azithromycin 3/23 - 3/24         Family History:   Cardiovascular disease noted in Care Everywhere (brother, father)         Social History:     Former smoker (20 pack years, quit 1980)  Alcohol use           Physical Exam:   Ranges forvital signs:  Temp:  [99  F (37.2  C)-100.6  F (38.1  C)] 99  F (37.2  C)  Heart Rate:  [62-82] 67  Resp:  [25-28] 25  MAP:  [76 mmHg-99 mmHg] 84 mmHg  Arterial Line BP: (111-151)/(51-68) 122/59  FiO2 (%):  [50 %-100 %] 75 %  SpO2:  [90 %-100 %] 98 %    Exam: deferred to preserve PPE; patient intubated and sedated in ICU         Laboratory Data:   Reviewed via Care Everywhere.  3/23  WBC 3.9   -   HGB 14.6  PLT 73  INR 1.2  Na 131  Cr 1.21  UA small occult blood  D dimer 443    3/35  WBC 4.3  HGB 11  PLT 75  Trop 0.056  Na 137  Cr 0.77    ABG 7.4/33/33 --> 7.39/34/79    Culture data:  Covid + 3/23  Flu A/B - 3/23  Stool pcr - 3/23  Cdiff - 3/23  BCx +S epi (3/23) - presumed contaminant           Imaging:   CXR 3/25  IMPRESSION:   1.  Right IJ line terminates in the distal SVC 2.9 cm above the cavoatrial junction.  2.  Lines and tubes otherwise stable.  3.  Increased retrocardiac consolidation with bilateral asymmetric infiltrates again noted.    CXR 3/23  FINDINGS: Low lung volumes. There is patchy infiltrate seen at the left lung base with some infiltrate projecting over the heart and posteriorly on the lateral view. Findings likely reflect pneumonia. Right midlung opacity could also represent an area of pneumonia. No pleural effusion. Heart size is normal.    CT Head 3/23  IMPRESSION:  1.  No acute intracranial hemorrhage or acute abnormality of the brain.  2.  Mild diffuse cerebral atrophy and chronic white matter ischemic change.

## 2020-03-28 NOTE — PROGRESS NOTES
[facility] MICU STAFF BRIEF ICU NOTE    Hospital day #: 4  Ventilator day #: 5    Assessment:  Mr. Eduardo Kemp is a 71 yo male  with a history of HTN, HL, and BPH who was diagnosed with COVID19 via nasal swab on 3/23 at [facility], transferred to [facility] (intubated there on 3/24) and transferred here on 3/25 for the  remdesivir clinical trial. He has severe ARDS. Paralysis started on 3/25 as did prone positioning. P/F ratio 56 yesterday at 1600 when supinated, and re-proned at midnight today with PF improvement to 125. Currently on volume assist control, 25/400/55/+15 with plateau pressure of 25 (while proned). I decreased his FiO2 to 50% and PEEP from 15 to 12 [id number] at 12:00 PM today with decrease in plateau pressure from 25 to 20 and decrease in driving pressure from 10 to 8. The tidal volume of 400 mL represents 6 mL/kg of IBW. On Ceftraixone and Azithromycin with MRSA swab and negative flu. TTE showed Ef 40-45% with normal RV function. Hemodynamically stable. Net positive 2L for admission and weights are 112kg from 108kg.  from 250 from 180 with every other day labs. Severe protein calorie malnutrition with normal renal/liver function. Versed 8, fentanyl 100 without Propofol and remains on Nimbex (started 3/25 at 11PM, attempted stopping 3/27 while proned and he decompensated due to ventilatory dyssynchrony and hypoxemia). I have spoke with patient placement, the COVID triage team is aware of Eduardo and they will get back to me regarding what/when their threshold would be for transfer to [place].     Acute Issues:    COVID pneumonia    ARDS, severe    Acute hypoxemic respiratory failure    Acute encephalopathy    Severe protein calorie malnutrition    HFrEf      Plan:    Continue paralysis today (failed stopping <24 hours ago) and re-attempt stopping tomorrow    Start IV diuresis with goal 1-2 liters net negative per day    Continue trickle feeds; can't start goal feeds  until post-pyloric placed    Escalate bowel regimen to mag citrate and possible suppository today    Completing 5 days of CAP coverage today    ABG at 530PM and supinate at 6PM; re-prone at midnight if p/f <150       Please see the resident/FNP note from today for full details. The patient was seen and examined with the resident/fellow physician and/or FNP.  We have discussed the patient in detail and I agree with the findings, assessment, and plan as documented when the separate resident/FNP note was cosigned on this day. The plan was formulated in conjunction with pharmacy, ICU nurses, and respiratory therapist. I have evaluated all laboratory values and imaging studies for the past 24 hours. I have reviewed all the consults that have been ordered and are active for this patient.      Critical Care Time: 45 min.  I spent this time (excluding procedures) personally providing and directing critical care services at the bedside and on the critical care unit.          Dmitri Handy MD, 3/28/2020, 7:46 AM  MICU Attending  Department of Pulmonary, Allergy, Critical Care & Sleep Medicine   Pager: [phone]

## 2020-03-28 NOTE — PROGRESS NOTES
MEDICAL ICU PROGRESS NOTE  03/28/2020      Date of Service (when I saw the patient): 03/28/2020    ASSESSMENT: Eduardo Kemp is a 75 YO M with a h/o HTN who was admitted for AHRF and developed ARDS 2/2 COVID-19 (positive on 03/23/20 at an OSH) and transferred to [facility] on 03/25/20 for participation in the Remdesivir clinical trial. He remains intubated, sedated, paralyzed and proned. P:F worse yesterday while supine, and patient re-proned overnight.     CHANGES and MAJOR THINGS TODAY:   - currently proned and paralyzed  - ABG at 5:30 pm; supine at 6pm; repeat ABG at 6:30 pm  - Decrease PEEP as able 15 --> 12  - Consider CXR tomorrow - talk about on rounds  - Goal net negative 1-2L today - 40 IV Lasix now   - Try to place NJ when supine today (unable to obtain yesterday)     PLAN:  NEURO/PSYCH  # Sedation  - Versed gtt 8, Versed prn     # Pain  - Fentanyl gtt 100, and fentanyl prn     # Paralytics  S/p Vecuronium.  - Cistracurium gtt 3- started 3/25 11PM    # Acute encephalopathy 2/2 sedatives  Patient initially presented weak following a syncopal event at home. A CT Head on 3/23 at OSH showed no acute intracranial hemorrhage or acute abnormality of the brain, and mild diffuse cerebral atrophy and chronic white matter ischemic change. Currently sedated.  - continue sedation     CARDIOVASCULAR  # Hemodynamics  Stable.      # Bradycardia  Cardiology at OSH consulted. Tele without evidence of pauses or heart block. Per cards note, bradycardia may be due to hypoxia, apnea with sleep and increased vagal tone, and his infection. TTE showing mild LV reduction 40-45%, global RV function reduction.  - telemetry     # Decreased RV function per TTE  Likely d/t ARDS.   - treat ARDS (below)    # Elevated Troponin  Initial trop <0.056. Trop on arrival 0.036. EKG on arrival showing sinus bradycardia. Troponin 3/28 <0.015  - Will check trop as part of COVID labs q48 hrs (next 3/30)     # H/o HTN  -Hold PTA amlodipine 5 mg   -Hold  PTA hydrochlorothiazide 12.5 mg daily     PULMONARY  # ARDS  # Acute Hypoxic Respiratory Failure 2/2 COVID-19   Proned and paralyzed.   Ventilation Mode: CMV/AC  (Continuous Mandatory Ventilation/ Assist Control)  FiO2 (%): 50 %  Rate Set (breaths/minute): 25 breaths/min  Tidal Volume Set (mL): 400 mL  PEEP (cm H2O): (S) 12 cmH2O  Oxygen Concentration (%): (S) 50 %  Resp: 25  P:F 125  Plateau Pressure: <30  - ABG at 5:30 pm; supine at 6pm; repeat ABG at 6:30 pm  - if P:F >150 then consider not proning again tonight  - if patient tolerates supine overnight, then consider stopping paralytic  - artifical tears  - 66 kg= ideal BW   - Keep lungs dry - 3/27 was net positive 1.1 L, 40 IV lasix on 3/28 with goal net neg 1-2 L    GI  # Bowel Regimen  No BM since admission.  - Senna BID and Miralax BID  - 3/28 - x1 mag citrate  - 3/28 suppository      # Nutrition:   - nutrition consulted 03/26/20 for trickle feeds while prone  - NJ when supine today (unable to obtain yesterday)      # Stress Ulcer prophylaxis: Famotidine     RENAL  # Hypokalemia  - K replacement protocol  - Mg and Phos replacement protocol  - BMP     # BPH  -PTA on tamsulosin 0.4 mg daily; hold     # Volume - Euvolemic  S/p IVF at OSH in setting of initial dehydration.     HEME/ONC  # Thrombocytopenia  No known prior history, suspect 2/2 infection. Stable ~120.  -Daily CBC    # Normocytic Anemia  Hgb 13, no signs of bleeding.   -Monitor.      # Transfusion History: none     ENDOCRINE  BG stable. HgbA1c 5.2.  - BG checks QID     INFECTIOUS DISEASE  # Sepsis  # COVID-19 Infection  Initially had extensive infectious work-up at OSH. C Diff neg, enteric panel neg. Flu A and B neg. Strep pneumo and Legionella neg. UA neg. CXR showing bilateral pulmonary infiltrates. COVID testing positive on 03/25/20. OSH discussed with RACHELL.   - Infectious disease consulted; Remdesivir study participant  - Contact, droplet, airborne precautions  - continue checking COVID labs:  trop, CRP, ferritin q48 hrs (next 3/30)  - trend CBC with diff, CMP, CK  - no Tylenol or NSAIDS for fevers  - CRP 3/25 180 --> 3/26 250 --> 3/28 260     # Antimicrobials  Ceftriaxone 2 g IV, 5 day course, duration until 03/27  Azithromycin 250 mg every day, 5 day course, duration until 03/27     # Cultures  -OSH BC 3/23 - 1/1 staph epidermidis  - Blood culture 03/25/20: NGTD       SKIN/MSK  PT and OT consult      General Cares/Prophylaxis:    DVT Prophylaxis: Enoxaparin (Lovenox) SQ  GI Prophylaxis: PPI  Restraints: none  Family Communication: will call wife    Lines/tubes/drains:  RIJ and R brachial artery               ETT, OG     Disposition:  - Medical ICU     The patient was seen and discussed with Dr. Handy, attending physician.     Samantha Chicas MD  MedPeds PGY3  [phone]    ====================================  INTERVAL HISTORY:   Afternoon P:F improved, but did not tolerated cisatracurium off. P/F supine 56, re-proned at midnight. P/F is better this AM. Unable to place NJ when supine due to instability.     OBJECTIVE:   1. VITAL SIGNS:   Temp:  [98.1  F (36.7  C)-100.6  F (38.1  C)] 98.1  F (36.7  C)  Heart Rate:  [58-82] 58  Resp:  [25-28] 25  MAP:  [74 mmHg-103 mmHg] 103 mmHg  Arterial Line BP: (107-147)/(52-73) 147/73  FiO2 (%):  [50 %-100 %] 65 %  SpO2:  [90 %-100 %] 97 %  Ventilation Mode: CMV/AC  (Continuous Mandatory Ventilation/ Assist Control)  FiO2 (%): 65 %  Rate Set (breaths/minute): 25 breaths/min  Tidal Volume Set (mL): 400 mL  PEEP (cm H2O): 15 cmH2O  Oxygen Concentration (%): 50 %  Resp: 25    2. INTAKE/ OUTPUT:   I/O last 3 completed shifts:  In: 2146.11 [I.V.:1436.11; NG/GT:470]  Out: 1035 [Urine:1035]  Net 24 hrs: + 1.1 L  Since admit: + 1.7 L    Drips:  - Paralytic: Cis 3 mcg/kg/min   - Analgesia: Fentanyl 100 mcg/hr  - Sedation: versed 8 mL/hr    3. PHYSICAL EXAMINATION:  General: proned man in bed  HEENT: NC, AT, MMM  Neuro: sedated, paralyzed  Pulm/Resp: synchronous with  vent  CV: bradycardic-low normal rate, normal rhythm on tele  Abdomen: did not evaluate  : did not evaluate  Incisions/Skin: no visible rashes    4. LABS:   03/28 Ferritin: 2,950 (from 1687)  03/28 CRP: 260 (from 250)  03/26 D-dimer: 2.2 (from 2.4)    Arterial Blood Gases   Recent Labs   Lab 03/28/20  0348 03/28/20  0035 03/27/20  1820 03/27/20  1713   PH 7.42 7.41 7.41 7.41   PCO2 43 43 43 43   PO2 69* 86 56* 79*   HCO3 28 27 27 28     Complete Blood Count   Recent Labs   Lab 03/28/20 0348 03/27/20  0412 03/26/20  0427 03/25/20  1447   WBC  --  5.2 5.6 4.5   HGB  --  13.0* 12.9* 12.5*   * 119* 101* 82*     Basic Metabolic Panel  Recent Labs   Lab 03/28/20 0348 03/27/20  0412 03/26/20  1147 03/26/20  0427 03/25/20  1447   NA  --  138  --  135 135   POTASSIUM 3.5 3.4 4.0 3.0* 3.6   CHLORIDE  --  107  --  105 106   CO2  --  25  --  24 25   BUN  --  14  --  11 13   CR 0.63* 0.74  --  0.90 0.89   GLC  --  100*  --  99 86     Liver Function Tests  Recent Labs   Lab 03/27/20 0412 03/26/20  0427 03/25/20  1447   AST 51* 56* 49*   ALT 25 29 28   ALKPHOS 41 41 39*   BILITOTAL 0.3 0.3 0.4   ALBUMIN 1.8* 2.0* 2.1*   INR  --   --  1.11     Pancreatic Enzymes  No lab results found in last 7 days.  Coagulation Profile  Recent Labs   Lab 03/25/20  1447   INR 1.11     5. RADIOLOGY:   No results found for this or any previous visit (from the past 24 hour(s)).  03/25/20 TTE  Mildly (EF 40-45%) reduced left ventricular function is present.  Global right ventricular function is mildly reduced.  Unable to assess mean RA pressure given the patient is on a ventilator.  No pericardial effusion is present.  There is no prior study for direct comparison.    =========================================

## 2020-03-28 NOTE — PLAN OF CARE
OT 4C: HOLD. Per chart review and discussion with RN, pt is medically paralyzed, sedated, and proned. (+) Covid 19. Pt is not medically appropriate for therapy, will hold, monitor status daily, and initiate as appropriate.

## 2020-03-29 LAB
ALBUMIN SERPL-MCNC: 1.7 G/DL (ref 3.4–5)
ALP SERPL-CCNC: 51 U/L (ref 40–150)
ALT SERPL W P-5'-P-CCNC: 64 U/L (ref 0–70)
ANION GAP SERPL CALCULATED.3IONS-SCNC: 3 MMOL/L (ref 3–14)
AST SERPL W P-5'-P-CCNC: 120 U/L (ref 0–45)
BASE EXCESS BLDA CALC-SCNC: 6 MMOL/L
BASE EXCESS BLDA CALC-SCNC: 6.7 MMOL/L
BASE EXCESS BLDA CALC-SCNC: 7.1 MMOL/L
BASE EXCESS BLDA CALC-SCNC: 8.1 MMOL/L
BASOPHILS # BLD AUTO: 0 10E9/L (ref 0–0.2)
BASOPHILS NFR BLD AUTO: 0.4 %
BILIRUB SERPL-MCNC: 0.4 MG/DL (ref 0.2–1.3)
BUN SERPL-MCNC: 18 MG/DL (ref 7–30)
CALCIUM SERPL-MCNC: 7.8 MG/DL (ref 8.5–10.1)
CHLORIDE SERPL-SCNC: 104 MMOL/L (ref 94–109)
CK SERPL-CCNC: 60 U/L (ref 30–300)
CO2 SERPL-SCNC: 30 MMOL/L (ref 20–32)
CREAT SERPL-MCNC: 0.71 MG/DL (ref 0.66–1.25)
D DIMER PPP FEU-MCNC: 2.8 UG/ML FEU (ref 0–0.5)
DIFFERENTIAL METHOD BLD: ABNORMAL
EOSINOPHIL # BLD AUTO: 0.1 10E9/L (ref 0–0.7)
EOSINOPHIL NFR BLD AUTO: 2.6 %
ERYTHROCYTE [DISTWIDTH] IN BLOOD BY AUTOMATED COUNT: 13.6 % (ref 10–15)
GFR SERPL CREATININE-BSD FRML MDRD: >90 ML/MIN/{1.73_M2}
GLUCOSE BLDC GLUCOMTR-MCNC: 103 MG/DL (ref 70–99)
GLUCOSE BLDC GLUCOMTR-MCNC: 125 MG/DL (ref 70–99)
GLUCOSE BLDC GLUCOMTR-MCNC: 99 MG/DL (ref 70–99)
GLUCOSE SERPL-MCNC: 109 MG/DL (ref 70–99)
HCO3 BLD-SCNC: 31 MMOL/L (ref 21–28)
HCO3 BLD-SCNC: 31 MMOL/L (ref 21–28)
HCO3 BLD-SCNC: 32 MMOL/L (ref 21–28)
HCO3 BLD-SCNC: 33 MMOL/L (ref 21–28)
HCT VFR BLD AUTO: 39.7 % (ref 40–53)
HGB BLD-MCNC: 13 G/DL (ref 13.3–17.7)
IL6 SERPL-MCNC: 202.43 PG/ML
IMM GRANULOCYTES # BLD: 0.1 10E9/L (ref 0–0.4)
IMM GRANULOCYTES NFR BLD: 1.1 %
LDH SERPL L TO P-CCNC: 452 U/L (ref 85–227)
LYMPHOCYTES # BLD AUTO: 0.7 10E9/L (ref 0.8–5.3)
LYMPHOCYTES NFR BLD AUTO: 13.6 %
MAGNESIUM SERPL-MCNC: 2.2 MG/DL (ref 1.6–2.3)
MCH RBC QN AUTO: 30.5 PG (ref 26.5–33)
MCHC RBC AUTO-ENTMCNC: 32.7 G/DL (ref 31.5–36.5)
MCV RBC AUTO: 93 FL (ref 78–100)
MONOCYTES # BLD AUTO: 0.4 10E9/L (ref 0–1.3)
MONOCYTES NFR BLD AUTO: 8 %
NEUTROPHILS # BLD AUTO: 4 10E9/L (ref 1.6–8.3)
NEUTROPHILS NFR BLD AUTO: 74.3 %
NRBC # BLD AUTO: 0 10*3/UL
NRBC BLD AUTO-RTO: 0 /100
NT-PROBNP SERPL-MCNC: 161 PG/ML (ref 0–900)
O2/TOTAL GAS SETTING VFR VENT: 100 %
O2/TOTAL GAS SETTING VFR VENT: 55 %
O2/TOTAL GAS SETTING VFR VENT: 60 %
O2/TOTAL GAS SETTING VFR VENT: 60 %
PCO2 BLD: 42 MM HG (ref 35–45)
PCO2 BLD: 42 MM HG (ref 35–45)
PCO2 BLD: 45 MM HG (ref 35–45)
PCO2 BLD: 45 MM HG (ref 35–45)
PH BLD: 7.46 PH (ref 7.35–7.45)
PH BLD: 7.47 PH (ref 7.35–7.45)
PH BLD: 7.48 PH (ref 7.35–7.45)
PH BLD: 7.49 PH (ref 7.35–7.45)
PHOSPHATE SERPL-MCNC: 2.5 MG/DL (ref 2.5–4.5)
PLATELET # BLD AUTO: 171 10E9/L (ref 150–450)
PLATELET # BLD EST: ABNORMAL 10*3/UL
PO2 BLD: 57 MM HG (ref 80–105)
PO2 BLD: 67 MM HG (ref 80–105)
PO2 BLD: 71 MM HG (ref 80–105)
PO2 BLD: 79 MM HG (ref 80–105)
POTASSIUM SERPL-SCNC: 3.3 MMOL/L (ref 3.4–5.3)
POTASSIUM SERPL-SCNC: 3.4 MMOL/L (ref 3.4–5.3)
PROT SERPL-MCNC: 5.8 G/DL (ref 6.8–8.8)
RBC # BLD AUTO: 4.26 10E12/L (ref 4.4–5.9)
SODIUM SERPL-SCNC: 138 MMOL/L (ref 133–144)
WBC # BLD AUTO: 5.4 10E9/L (ref 4–11)

## 2020-03-29 PROCEDURE — 82803 BLOOD GASES ANY COMBINATION: CPT | Performed by: STUDENT IN AN ORGANIZED HEALTH CARE EDUCATION/TRAINING PROGRAM

## 2020-03-29 PROCEDURE — 20000004 ZZH R&B ICU UMMC

## 2020-03-29 PROCEDURE — 82550 ASSAY OF CK (CPK): CPT | Performed by: STUDENT IN AN ORGANIZED HEALTH CARE EDUCATION/TRAINING PROGRAM

## 2020-03-29 PROCEDURE — 00000146 ZZHCL STATISTIC GLUCOSE BY METER IP

## 2020-03-29 PROCEDURE — 25000132 ZZH RX MED GY IP 250 OP 250 PS 637: Mod: GY | Performed by: INTERNAL MEDICINE

## 2020-03-29 PROCEDURE — 25000132 ZZH RX MED GY IP 250 OP 250 PS 637: Mod: GY | Performed by: STUDENT IN AN ORGANIZED HEALTH CARE EDUCATION/TRAINING PROGRAM

## 2020-03-29 PROCEDURE — 25000128 H RX IP 250 OP 636: Performed by: STUDENT IN AN ORGANIZED HEALTH CARE EDUCATION/TRAINING PROGRAM

## 2020-03-29 PROCEDURE — 94003 VENT MGMT INPAT SUBQ DAY: CPT

## 2020-03-29 PROCEDURE — 84132 ASSAY OF SERUM POTASSIUM: CPT | Performed by: STUDENT IN AN ORGANIZED HEALTH CARE EDUCATION/TRAINING PROGRAM

## 2020-03-29 PROCEDURE — 80053 COMPREHEN METABOLIC PANEL: CPT | Performed by: STUDENT IN AN ORGANIZED HEALTH CARE EDUCATION/TRAINING PROGRAM

## 2020-03-29 PROCEDURE — 27210429 ZZH NUTRITION PRODUCT INTERMEDIATE LITER

## 2020-03-29 PROCEDURE — 83735 ASSAY OF MAGNESIUM: CPT | Performed by: STUDENT IN AN ORGANIZED HEALTH CARE EDUCATION/TRAINING PROGRAM

## 2020-03-29 PROCEDURE — 83880 ASSAY OF NATRIURETIC PEPTIDE: CPT | Performed by: STUDENT IN AN ORGANIZED HEALTH CARE EDUCATION/TRAINING PROGRAM

## 2020-03-29 PROCEDURE — 25000132 ZZH RX MED GY IP 250 OP 250 PS 637: Mod: GY | Performed by: NURSE PRACTITIONER

## 2020-03-29 PROCEDURE — 40000275 ZZH STATISTIC RCP TIME EA 10 MIN

## 2020-03-29 PROCEDURE — 83520 IMMUNOASSAY QUANT NOS NONAB: CPT | Performed by: STUDENT IN AN ORGANIZED HEALTH CARE EDUCATION/TRAINING PROGRAM

## 2020-03-29 PROCEDURE — 25000128 H RX IP 250 OP 636: Performed by: INTERNAL MEDICINE

## 2020-03-29 PROCEDURE — 40000014 ZZH STATISTIC ARTERIAL MONITORING DAILY

## 2020-03-29 PROCEDURE — 84100 ASSAY OF PHOSPHORUS: CPT | Performed by: STUDENT IN AN ORGANIZED HEALTH CARE EDUCATION/TRAINING PROGRAM

## 2020-03-29 PROCEDURE — 25000125 ZZHC RX 250: Performed by: STUDENT IN AN ORGANIZED HEALTH CARE EDUCATION/TRAINING PROGRAM

## 2020-03-29 PROCEDURE — 85025 COMPLETE CBC W/AUTO DIFF WBC: CPT | Performed by: STUDENT IN AN ORGANIZED HEALTH CARE EDUCATION/TRAINING PROGRAM

## 2020-03-29 PROCEDURE — 99291 CRITICAL CARE FIRST HOUR: CPT | Mod: GC | Performed by: PHYSICIAN ASSISTANT

## 2020-03-29 PROCEDURE — 85379 FIBRIN DEGRADATION QUANT: CPT | Performed by: STUDENT IN AN ORGANIZED HEALTH CARE EDUCATION/TRAINING PROGRAM

## 2020-03-29 PROCEDURE — 83615 LACTATE (LD) (LDH) ENZYME: CPT | Performed by: STUDENT IN AN ORGANIZED HEALTH CARE EDUCATION/TRAINING PROGRAM

## 2020-03-29 PROCEDURE — 25000128 H RX IP 250 OP 636: Performed by: NURSE PRACTITIONER

## 2020-03-29 PROCEDURE — 25800030 ZZH RX IP 258 OP 636: Performed by: STUDENT IN AN ORGANIZED HEALTH CARE EDUCATION/TRAINING PROGRAM

## 2020-03-29 RX ORDER — FUROSEMIDE 10 MG/ML
40 INJECTION INTRAMUSCULAR; INTRAVENOUS
Status: COMPLETED | OUTPATIENT
Start: 2020-03-29 | End: 2020-03-29

## 2020-03-29 RX ADMIN — Medication: at 20:23

## 2020-03-29 RX ADMIN — FUROSEMIDE 40 MG: 10 INJECTION, SOLUTION INTRAVENOUS at 16:21

## 2020-03-29 RX ADMIN — ENOXAPARIN SODIUM 40 MG: 40 INJECTION SUBCUTANEOUS at 18:21

## 2020-03-29 RX ADMIN — SENNOSIDES AND DOCUSATE SODIUM 2 TABLET: 8.6; 5 TABLET ORAL at 19:30

## 2020-03-29 RX ADMIN — BISACODYL 10 MG: 10 SUPPOSITORY RECTAL at 00:11

## 2020-03-29 RX ADMIN — POLYETHYLENE GLYCOL 3350 17 G: 17 POWDER, FOR SOLUTION ORAL at 08:25

## 2020-03-29 RX ADMIN — POTASSIUM CHLORIDE 20 MEQ: 1.5 POWDER, FOR SOLUTION ORAL at 08:24

## 2020-03-29 RX ADMIN — MIDAZOLAM (PF) 1 MG/ML IN 0.9 % SODIUM CHLORIDE INTRAVENOUS SOLUTION 8 MG/HR: at 06:40

## 2020-03-29 RX ADMIN — FAMOTIDINE 20 MG: 20 TABLET ORAL at 19:30

## 2020-03-29 RX ADMIN — FENTANYL CITRATE 50 MCG: 50 INJECTION, SOLUTION INTRAMUSCULAR; INTRAVENOUS at 19:31

## 2020-03-29 RX ADMIN — FAMOTIDINE 20 MG: 20 TABLET ORAL at 08:25

## 2020-03-29 RX ADMIN — MIDAZOLAM 2 MG: 1 INJECTION INTRAMUSCULAR; INTRAVENOUS at 23:00

## 2020-03-29 RX ADMIN — FENTANYL CITRATE 50 MCG: 50 INJECTION, SOLUTION INTRAMUSCULAR; INTRAVENOUS at 23:40

## 2020-03-29 RX ADMIN — POTASSIUM CHLORIDE 40 MEQ: 1.5 POWDER, FOR SOLUTION ORAL at 05:24

## 2020-03-29 RX ADMIN — Medication 150 MCG/HR: at 18:35

## 2020-03-29 RX ADMIN — DOXAZOSIN 1 MG: 1 TABLET ORAL at 08:27

## 2020-03-29 RX ADMIN — MIDAZOLAM 2 MG: 1 INJECTION INTRAMUSCULAR; INTRAVENOUS at 19:31

## 2020-03-29 RX ADMIN — Medication: at 02:26

## 2020-03-29 RX ADMIN — Medication: at 14:20

## 2020-03-29 RX ADMIN — MIDAZOLAM (PF) 1 MG/ML IN 0.9 % SODIUM CHLORIDE INTRAVENOUS SOLUTION 8 MG/HR: at 18:35

## 2020-03-29 RX ADMIN — FUROSEMIDE 40 MG: 10 INJECTION, SOLUTION INTRAVENOUS at 12:15

## 2020-03-29 RX ADMIN — SENNOSIDES AND DOCUSATE SODIUM 2 TABLET: 8.6; 5 TABLET ORAL at 08:25

## 2020-03-29 RX ADMIN — POTASSIUM CHLORIDE 20 MEQ: 1.5 POWDER, FOR SOLUTION ORAL at 21:57

## 2020-03-29 RX ADMIN — DEXTROSE MONOHYDRATE 4 MCG/KG/MIN: 50 INJECTION, SOLUTION INTRAVENOUS at 01:38

## 2020-03-29 RX ADMIN — Medication: at 08:27

## 2020-03-29 RX ADMIN — DEXTROSE MONOHYDRATE 4 MCG/KG/MIN: 50 INJECTION, SOLUTION INTRAVENOUS at 10:09

## 2020-03-29 ASSESSMENT — ACTIVITIES OF DAILY LIVING (ADL)
ADLS_ACUITY_SCORE: 15

## 2020-03-29 NOTE — PROGRESS NOTES
MEDICAL ICU PROGRESS NOTE  03/29/2020      Date of Service (when I saw the patient): 03/29/2020    ASSESSMENT: Eduardo Kemp is a 73 YO M with a h/o HTN who was admitted for AHRF and developed ARDS 2/2 COVID-19 (positive on 03/23/20 at an OSH) and transferred to [facility] on 03/25/20 for participation in the Remdesivir clinical trial. He remains intubated, sedated, paralyzed and proned.     CHANGES and MAJOR THINGS TODAY:   - Currently proned and paralyzed  - Lasix 40 IV BID  - Stop paralytic  - Discontinued CTX  - Increased Fentanyl gtt dose    PLAN:  NEURO/PSYCH  # Sedation  - Versed gtt 8, Versed prn     # Pain  - Increased Fentanyl gtt to 100-200, and fentanyl prn      # Paralytics  S/p Vecuronium.  - Cistracurium gtt 4 - started 3/25 11PM (stop and check gas 3 pm)    # Acute encephalopathy 2/2 sedatives  Patient initially presented weak following a syncopal event at home. A CT Head on 3/23 at OSH showed no acute intracranial hemorrhage or acute abnormality of the brain, and mild diffuse cerebral atrophy and chronic white matter ischemic change. Currently sedated.  - continue sedation     CARDIOVASCULAR  # Hemodynamics  Stable.      # Bradycardia  Cardiology at OSH consulted. Tele without evidence of pauses or heart block. Per cards note, bradycardia may be due to hypoxia, apnea with sleep and increased vagal tone, and his infection. TTE showing mild LV reduction 40-45%, global RV function reduction.  - telemetry     # Decreased RV function per TTE  Likely d/t ARDS.   - treat ARDS (below)    # Elevated Troponin  Initial trop <0.056. Trop on arrival 0.036. EKG on arrival showing sinus bradycardia. Troponin 3/28 <0.015  - Will check trop as part of COVID labs q48 hrs (next 3/30)     # H/o HTN  -Hold PTA amlodipine 5 mg   -Hold PTA hydrochlorothiazide 12.5 mg daily     PULMONARY  # ARDS  # Acute Hypoxic Respiratory Failure 2/2 COVID-19   Proned and paralyzed.   Ventilation Mode: CMV/AC  (Continuous Mandatory  Ventilation/ Assist Control)  FiO2 (%): 60 %  Rate Set (breaths/minute): 25 breaths/min  Tidal Volume Set (mL): 400 mL  PEEP (cm H2O): 12 cmH2O  Oxygen Concentration (%): 60 %  Resp: 25  P:F: 111  Plateau Pressure: <30 (24-26)  - ABG at 5:30 pm; supine at 6pm; repeat ABG at 6:30 pm  - if P:F >150 then consider not proning again tonight  - if patient tolerates supine overnight, then consider stopping paralytic  - artifical tears  - 66 kg= ideal BW   - Keep lungs dry -  goal net neg 1-2 L; lasix 40 Iv BID today    GI  # Bowel Regimen  - Senna BID and Miralax BID  - 3/28 - x1 mag citrate  - 3/28 suppository      # Nutrition:   - nutrition consulted 03/26/20 for trickle feeds while prone  - NJ placed last night  - consider pushing NJ further and starting feeds     # Stress Ulcer prophylaxis: Famotidine     RENAL  # Hypokalemia  - K replacement protocol  - Mg and Phos replacement protocol  - BMP     # BPH  -PTA on tamsulosin 0.4 mg daily; hold     # Volume - Euvolemic  S/p IVF at OSH in setting of initial dehydration. S/p Lasix 03/29/20 for net neg 1.5L     HEME/ONC  # Normocytic Anemia  Hgb 13, no signs of bleeding.   -Monitor.      # Transfusion History: none     ENDOCRINE  BG stable. HgbA1c 5.2.  - BG checks QID     INFECTIOUS DISEASE  # Sepsis  # COVID-19 Infection  Initially had extensive infectious work-up at OSH. C Diff neg, enteric panel neg. Flu A and B neg. Strep pneumo and Legionella neg. UA neg. CXR showing bilateral pulmonary infiltrates. COVID testing positive on 03/25/20. OSH discussed with MDTHOMAS.   - Infectious disease consulted; Remdesivir study participant  - Contact, droplet, airborne precautions  - continue checking COVID labs: trop, CRP, ferritin q48 hrs (next 3/30)  - trend CBC with diff, CMP, CK  - no Tylenol or NSAIDS for fevers   - 03/28/20 Ferritin: 2,950 (from 1687)  - CRP 3/25 180 --> 3/26 250 --> 3/28 260  - 03/29/20 D-dimer: 2.2 -> 2.9  - 03/29/20 CK: 60  - 03/29/20 LDH: 452  - 03/29/20 IL-6:  in process  - 03/29/20 Pro-BNP: 161; consider CXR  = will space-out COVID labs to 72 hrs    # Antimicrobials  Ceftriaxone 2 g IV, 5 day course, duration until 03/27  Azithromycin 250 mg every day, 5 day course, duration until 03/27     # Cultures  -OSH BC 3/23 - 1/1 staph epidermidis  - Blood culture 03/25/20: NGTD       SKIN/MSK  PT and OT consult    General Cares/Prophylaxis:    DVT Prophylaxis: Enoxaparin (Lovenox) SQ  GI Prophylaxis: PPI  Restraints: none  Family Communication: will call wife    Lines/tubes/drains:  RIJ and R brachial artery               ETT, OG     Disposition:  - Medical ICU     The patient was seen and discussed with Dr. Handy, attending physician.     Krzysztof Owens Jr, MD  Int Med PGY1  [phone]    ====================================  INTERVAL HISTORY:   Overnight, patient had small BM  NJ in good position, continued TF's.    OBJECTIVE:   1. VITAL SIGNS:   Temp:  [96.6  F (35.9  C)-99.3  F (37.4  C)] 96.6  F (35.9  C)  Heart Rate:  [61-82] 65  Resp:  [25] 25  BP: (124)/(77) 124/77  MAP:  [74 mmHg-123 mmHg] 89 mmHg  Arterial Line BP: (102-162)/(54-99) 121/66  FiO2 (%):  [50 %-100 %] 60 %  SpO2:  [86 %-99 %] 93 %  Ventilation Mode: CMV/AC  (Continuous Mandatory Ventilation/ Assist Control)  FiO2 (%): 60 %  Rate Set (breaths/minute): 25 breaths/min  Tidal Volume Set (mL): 400 mL  PEEP (cm H2O): 12 cmH2O  Oxygen Concentration (%): 60 %  Resp: 25    2. INTAKE/ OUTPUT:   I/O last 3 completed shifts:  In: 2496.67 [I.V.:1456.67; NG/GT:840]  Out: 4010 [Urine:4010]  Net 24 hrs: - 1.5 L  Since admit: + 130 cc    Drips:  - Paralytic: Cis 4 mcg/kg/min   - Analgesia: Fentanyl 100 mcg/hr  - Sedation: versed 8 mL/hr    3. PHYSICAL EXAMINATION:  General: proned man in bed  HEENT: NC, AT, MMM  Neuro: sedated, paralyzed  Pulm/Resp: synchronous with vent  CV: bradycardic-low normal rate, normal rhythm on tele  Abdomen: did not evaluate  : did not evaluate  Incisions/Skin: no visible rashes    4. LABS:    Arterial Blood Gases   Recent Labs   Lab 03/29/20  0316 03/28/20 2007 03/28/20  1818 03/28/20  1729   PH 7.47* 7.45 7.44 7.44   PCO2 42 41 42 43   PO2 67* 119* 55* 68*   HCO3 31* 28 29* 29*     Complete Blood Count   Recent Labs   Lab 03/29/20 0316 03/28/20 0348 03/27/20  0412 03/26/20  0427   WBC 5.4 5.3 5.2 5.6   HGB 13.0* 12.8* 13.0* 12.9*    142*  142* 119* 101*     Basic Metabolic Panel  Recent Labs   Lab 03/29/20 0316 03/28/20  1731 03/28/20 0348 03/27/20  0412    139 139 138   POTASSIUM 3.3* 3.4 3.5 3.4   CHLORIDE 104 107 108 107   CO2 30 28 26 25   BUN 18 18 16 14   CR 0.71 0.70 0.63* 0.74   * 112* 101* 100*     Liver Function Tests  Recent Labs   Lab 03/29/20 0316 03/28/20 0348 03/27/20  0412 03/26/20  0427 03/25/20  1447   * 77* 51* 56* 49*   ALT 64 37 25 29 28   ALKPHOS 51 44 41 41 39*   BILITOTAL 0.4 0.4 0.3 0.3 0.4   ALBUMIN 1.7* 1.6* 1.8* 2.0* 2.1*   INR  --   --   --   --  1.11     Pancreatic Enzymes  No lab results found in last 7 days.  Coagulation Profile  Recent Labs   Lab 03/25/20  1447   INR 1.11     5. RADIOLOGY:   Recent Results (from the past 24 hour(s))   XR Abdomen Port 1 View    Narrative    Exam: XR ABDOMEN PORT 1 VW, 3/28/2020 10:07 PM    Indication: NJ placement    Comparison: 3/25/2020    Findings:   Supine frontal view of the abdomen. Enteric tube coils in the stomach  fundus with tip near the expected location of the pylorus/duodenal  bulb. Paucity of small bowel gas. No appreciable pneumatosis. Rectal  temperature probe. Degenerative change in the visualized spine.      Impression    Impression: Enteric tube tip projects near the expected location of  the pylorus/duodenal bulb.    CEM MENDIETA DO     03/25/20 TTE  Mildly (EF 40-45%) reduced left ventricular function is present.  Global right ventricular function is mildly reduced.  Unable to assess mean RA pressure given the patient is on a ventilator.  No pericardial effusion is  present.  There is no prior study for direct comparison.    =========================================

## 2020-03-29 NOTE — PROGRESS NOTES
[facility] MICU STAFF BRIEF ICU NOTE    Hospital day #: 5  Ventilator day #: 6    Assessment:  Mr. Eduardo Kemp is a 69 yo male  with a history of HTN, HL, and BPH who was diagnosed with COVID19 via nasal swab on 3/23 at [facility], transferred to [facility] (intubated there on 3/24) and transferred here on 3/25 for the  remdesivir clinical trial. He has severe ARDS. Paralysis started on 3/25. Prone positioning started 3/25. P/F ratios: 136 (pre-supine last night), 55 (immediately post-supine last night), 119 (pre-prone this AM), 111 (post-prone this AM). Vt at 6 ml/kg IBW, driving pressure ~12, plateau pressures low 20's. Does well with PEEP 10/FiO2 50% while proned, PEEP of 12 when supinated. Flu negative and has completed empiric CAP coverage. Responded well to Lasix 80 mg yesterday, and will continue again today but monitor rising bicarbonate (BUN/Cr stable). Starting nutrition today after placement of NJ tube. Twitches only 3/4 today, had difficulty with stopping Nimbex on 3/27, will stop Nimbex today with repeat ABG this afternoon. I did speak with the COVID bed-coordinator yesterday re: timing of transfer to [place]; although I didn't exactly get an answer, he probably isn't stable to transfer today and they are aware of him.     Acute Issues:    COVID pneumonia    ARDS, severe    Acute hypoxemic respiratory failure    Acute encephalopathy    Severe protein calorie malnutrition    HFrEf      Increase fentanyl dosing, continue Versed at 8  Stop cis today, 3/4 twitches   Had BM with increase in bowel regimen yesterday  NJ placed and tube feeds being advanced towards goal  -1.5 L net negative last 24 hours with 80 mg IV total    Plan:    Discontinue paralysis today, increase Fentanyl, continue Midazolam    Diuresis again today with Lasix IV 40 mg BID for two doses    Advancing tube feeds to goal, continue bowel regimen as ordered    Repeat ABG this afternoon    Continue studies labs;  spread out d-dimer/LDH to Q72 hours       Please see the resident/FNP note from today for full details. The patient was seen and examined with the resident/fellow physician and/or FNP.  We have discussed the patient in detail and I agree with the findings, assessment, and plan as documented when the separate resident/FNP note was cosigned on this day. The plan was formulated in conjunction with pharmacy, ICU nurses, and respiratory therapist. I have evaluated all laboratory values and imaging studies for the past 24 hours. I have reviewed all the consults that have been ordered and are active for this patient.      Critical Care Time: 35 min.  I spent this time (excluding procedures) personally providing and directing critical care services at the bedside and on the critical care unit.        Dmitri Handy MD, 3/29/2020, 7:35 AM  Staff Physician  Department of Pulmonary, Allergy, Critical Care & Sleep Medicine   Pager: [phone]

## 2020-03-29 NOTE — PLAN OF CARE
ICU End of Shift Summary. See flowsheets for vital signs and detailed assessment.    Changes this shift: Weaned FiO2 to 60% while proned. Increased cis to 4 to maintain 3/4 twitches on TOF. Extra dose of lasix given with good UO, net negative ~1500ml for 3/28. Potassium being replaced per protocol, will recheck level today. NJ placement verified, restarted trickle feeds at 2230. Suppository given, with 1 small/medium loose BM this morning, rectal pouch in place.     Plan: Continue to wean FiO2 as able. Recheck potassium. Plan to advance tube feeds today.

## 2020-03-29 NOTE — PROGRESS NOTES
Nutrition Services - Brief Note    Note a small bowel FT was placed by RN yesterday while pt was supine. Prior to this, TF were started via OGT on 3/26 but only held at trophic rate d/t pt being prone. Now that pt has a small bowel FT can advance TF to help provide for estimated kcal/PRO needs.     K+ today is low at 3.3 but other lytes (Phos and Mg++) are WNL.     Interventions already implemented by the RD:  Change to standard, non-immune modulating TF regimen given COVID-19 positive and [employer]M/ASPEN guidelines (2016) for no specialty formula use for MICU pt population. Therefore, changed to Nutren 1.5 @ 60 mL/hr to provide 2160 kcals (27 kcal/kg/day), 98 g PRO (1.2 g/kg/day), 1094 mL H2O, 253 g CHO and no fiber daily.  This will meet estimated kcal/PRO needs.    Recommendations:  Once current bag of Impact Peptide runs out, change to Nutren 1.5 and adv towards goal rate, as orders written.    Replace K+ PRN.    RD will continue to follow.  Hazel Neal, STEFANIE, LD  (MICU dietitian, [facility]- [phone])

## 2020-03-30 ENCOUNTER — APPOINTMENT (OUTPATIENT)
Dept: GENERAL RADIOLOGY | Facility: CLINIC | Age: 75
DRG: 870 | End: 2020-03-30
Attending: INTERNAL MEDICINE
Payer: MEDICARE

## 2020-03-30 ENCOUNTER — APPOINTMENT (OUTPATIENT)
Dept: CARDIOLOGY | Facility: CLINIC | Age: 75
DRG: 870 | End: 2020-03-30
Attending: INTERNAL MEDICINE
Payer: MEDICARE

## 2020-03-30 DIAGNOSIS — Z00.6 RESEARCH STUDY PATIENT: Primary | ICD-10-CM

## 2020-03-30 LAB
ALBUMIN SERPL-MCNC: 1.7 G/DL (ref 3.4–5)
ALP SERPL-CCNC: 53 U/L (ref 40–150)
ALT SERPL W P-5'-P-CCNC: 54 U/L (ref 0–70)
ANION GAP SERPL CALCULATED.3IONS-SCNC: 2 MMOL/L (ref 3–14)
ANION GAP SERPL CALCULATED.3IONS-SCNC: 3 MMOL/L (ref 3–14)
AST SERPL W P-5'-P-CCNC: 77 U/L (ref 0–45)
BASE EXCESS BLDA CALC-SCNC: 6.5 MMOL/L
BASE EXCESS BLDA CALC-SCNC: 6.7 MMOL/L
BASE EXCESS BLDA CALC-SCNC: 6.9 MMOL/L
BASE EXCESS BLDA CALC-SCNC: 7.5 MMOL/L
BASE EXCESS BLDA CALC-SCNC: 7.6 MMOL/L
BASE EXCESS BLDA CALC-SCNC: 7.9 MMOL/L
BASE EXCESS BLDA CALC-SCNC: 8.4 MMOL/L
BASE EXCESS BLDA CALC-SCNC: 8.8 MMOL/L
BASOPHILS # BLD AUTO: 0 10E9/L (ref 0–0.2)
BASOPHILS NFR BLD AUTO: 0.4 %
BILIRUB SERPL-MCNC: 0.5 MG/DL (ref 0.2–1.3)
BUN SERPL-MCNC: 26 MG/DL (ref 7–30)
BUN SERPL-MCNC: 28 MG/DL (ref 7–30)
CALCIUM SERPL-MCNC: 7.9 MG/DL (ref 8.5–10.1)
CALCIUM SERPL-MCNC: 7.9 MG/DL (ref 8.5–10.1)
CHLORIDE SERPL-SCNC: 104 MMOL/L (ref 94–109)
CHLORIDE SERPL-SCNC: 105 MMOL/L (ref 94–109)
CO2 SERPL-SCNC: 32 MMOL/L (ref 20–32)
CO2 SERPL-SCNC: 33 MMOL/L (ref 20–32)
CREAT SERPL-MCNC: 0.71 MG/DL (ref 0.66–1.25)
CREAT SERPL-MCNC: 0.77 MG/DL (ref 0.66–1.25)
CRP SERPL-MCNC: 240 MG/L (ref 0–8)
DIFFERENTIAL METHOD BLD: ABNORMAL
EOSINOPHIL # BLD AUTO: 0.1 10E9/L (ref 0–0.7)
EOSINOPHIL NFR BLD AUTO: 1.9 %
ERYTHROCYTE [DISTWIDTH] IN BLOOD BY AUTOMATED COUNT: 13.6 % (ref 10–15)
FERRITIN SERPL-MCNC: 1726 NG/ML (ref 26–388)
GFR SERPL CREATININE-BSD FRML MDRD: 89 ML/MIN/{1.73_M2}
GFR SERPL CREATININE-BSD FRML MDRD: >90 ML/MIN/{1.73_M2}
GLUCOSE BLDC GLUCOMTR-MCNC: 115 MG/DL (ref 70–99)
GLUCOSE BLDC GLUCOMTR-MCNC: 120 MG/DL (ref 70–99)
GLUCOSE BLDC GLUCOMTR-MCNC: 126 MG/DL (ref 70–99)
GLUCOSE SERPL-MCNC: 124 MG/DL (ref 70–99)
GLUCOSE SERPL-MCNC: 131 MG/DL (ref 70–99)
HCO3 BLD-SCNC: 32 MMOL/L (ref 21–28)
HCO3 BLD-SCNC: 33 MMOL/L (ref 21–28)
HCO3 BLD-SCNC: 34 MMOL/L (ref 21–28)
HCT VFR BLD AUTO: 39.1 % (ref 40–53)
HGB BLD-MCNC: 12.7 G/DL (ref 13.3–17.7)
IMM GRANULOCYTES # BLD: 0.1 10E9/L (ref 0–0.4)
IMM GRANULOCYTES NFR BLD: 1.3 %
INTERPRETATION ECG - MUSE: NORMAL
LYMPHOCYTES # BLD AUTO: 0.8 10E9/L (ref 0.8–5.3)
LYMPHOCYTES NFR BLD AUTO: 15.4 %
MAGNESIUM SERPL-MCNC: 2.3 MG/DL (ref 1.6–2.3)
MCH RBC QN AUTO: 30.8 PG (ref 26.5–33)
MCHC RBC AUTO-ENTMCNC: 32.5 G/DL (ref 31.5–36.5)
MCV RBC AUTO: 95 FL (ref 78–100)
MONOCYTES # BLD AUTO: 0.5 10E9/L (ref 0–1.3)
MONOCYTES NFR BLD AUTO: 9 %
NEUTROPHILS # BLD AUTO: 3.8 10E9/L (ref 1.6–8.3)
NEUTROPHILS NFR BLD AUTO: 72 %
NRBC # BLD AUTO: 0 10*3/UL
NRBC BLD AUTO-RTO: 0 /100
O2/TOTAL GAS SETTING VFR VENT: 100 %
O2/TOTAL GAS SETTING VFR VENT: 50 %
O2/TOTAL GAS SETTING VFR VENT: 70 %
O2/TOTAL GAS SETTING VFR VENT: 70 %
O2/TOTAL GAS SETTING VFR VENT: 80 %
O2/TOTAL GAS SETTING VFR VENT: 90 %
PCO2 BLD: 44 MM HG (ref 35–45)
PCO2 BLD: 45 MM HG (ref 35–45)
PCO2 BLD: 45 MM HG (ref 35–45)
PCO2 BLD: 46 MM HG (ref 35–45)
PCO2 BLD: 47 MM HG (ref 35–45)
PCO2 BLD: 47 MM HG (ref 35–45)
PCO2 BLD: 48 MM HG (ref 35–45)
PCO2 BLD: 50 MM HG (ref 35–45)
PH BLD: 7.43 PH (ref 7.35–7.45)
PH BLD: 7.44 PH (ref 7.35–7.45)
PH BLD: 7.44 PH (ref 7.35–7.45)
PH BLD: 7.45 PH (ref 7.35–7.45)
PH BLD: 7.47 PH (ref 7.35–7.45)
PHOSPHATE SERPL-MCNC: 2.5 MG/DL (ref 2.5–4.5)
PLATELET # BLD AUTO: 188 10E9/L (ref 150–450)
PLATELET # BLD EST: ABNORMAL 10*3/UL
PO2 BLD: 109 MM HG (ref 80–105)
PO2 BLD: 60 MM HG (ref 80–105)
PO2 BLD: 75 MM HG (ref 80–105)
PO2 BLD: 78 MM HG (ref 80–105)
PO2 BLD: 80 MM HG (ref 80–105)
PO2 BLD: 81 MM HG (ref 80–105)
PO2 BLD: 88 MM HG (ref 80–105)
PO2 BLD: 93 MM HG (ref 80–105)
POTASSIUM SERPL-SCNC: 3.4 MMOL/L (ref 3.4–5.3)
POTASSIUM SERPL-SCNC: 3.5 MMOL/L (ref 3.4–5.3)
PROT SERPL-MCNC: 5.9 G/DL (ref 6.8–8.8)
RBC # BLD AUTO: 4.13 10E12/L (ref 4.4–5.9)
SODIUM SERPL-SCNC: 139 MMOL/L (ref 133–144)
SODIUM SERPL-SCNC: 141 MMOL/L (ref 133–144)
TROPONIN I SERPL-MCNC: <0.015 UG/L (ref 0–0.04)
WBC # BLD AUTO: 5.3 10E9/L (ref 4–11)

## 2020-03-30 PROCEDURE — 25000128 H RX IP 250 OP 636: Performed by: INTERNAL MEDICINE

## 2020-03-30 PROCEDURE — 25000125 ZZHC RX 250: Performed by: STUDENT IN AN ORGANIZED HEALTH CARE EDUCATION/TRAINING PROGRAM

## 2020-03-30 PROCEDURE — 82728 ASSAY OF FERRITIN: CPT | Performed by: NURSE PRACTITIONER

## 2020-03-30 PROCEDURE — 25000132 ZZH RX MED GY IP 250 OP 250 PS 637: Mod: GY | Performed by: NURSE PRACTITIONER

## 2020-03-30 PROCEDURE — 25000132 ZZH RX MED GY IP 250 OP 250 PS 637: Mod: GY | Performed by: INTERNAL MEDICINE

## 2020-03-30 PROCEDURE — 94003 VENT MGMT INPAT SUBQ DAY: CPT

## 2020-03-30 PROCEDURE — 71045 X-RAY EXAM CHEST 1 VIEW: CPT

## 2020-03-30 PROCEDURE — 20000004 ZZH R&B ICU UMMC

## 2020-03-30 PROCEDURE — 86140 C-REACTIVE PROTEIN: CPT | Performed by: NURSE PRACTITIONER

## 2020-03-30 PROCEDURE — 25500064 ZZH RX 255 OP 636: Performed by: INTERNAL MEDICINE

## 2020-03-30 PROCEDURE — 00000146 ZZHCL STATISTIC GLUCOSE BY METER IP

## 2020-03-30 PROCEDURE — 80053 COMPREHEN METABOLIC PANEL: CPT | Performed by: NURSE PRACTITIONER

## 2020-03-30 PROCEDURE — 84484 ASSAY OF TROPONIN QUANT: CPT | Performed by: NURSE PRACTITIONER

## 2020-03-30 PROCEDURE — 40000014 ZZH STATISTIC ARTERIAL MONITORING DAILY

## 2020-03-30 PROCEDURE — 80048 BASIC METABOLIC PNL TOTAL CA: CPT | Performed by: STUDENT IN AN ORGANIZED HEALTH CARE EDUCATION/TRAINING PROGRAM

## 2020-03-30 PROCEDURE — 82306 VITAMIN D 25 HYDROXY: CPT | Performed by: NURSE PRACTITIONER

## 2020-03-30 PROCEDURE — 40000275 ZZH STATISTIC RCP TIME EA 10 MIN

## 2020-03-30 PROCEDURE — 83735 ASSAY OF MAGNESIUM: CPT | Performed by: NURSE PRACTITIONER

## 2020-03-30 PROCEDURE — 82565 ASSAY OF CREATININE: CPT | Performed by: INTERNAL MEDICINE

## 2020-03-30 PROCEDURE — 93010 ELECTROCARDIOGRAM REPORT: CPT | Performed by: INTERNAL MEDICINE

## 2020-03-30 PROCEDURE — 25000128 H RX IP 250 OP 636: Performed by: STUDENT IN AN ORGANIZED HEALTH CARE EDUCATION/TRAINING PROGRAM

## 2020-03-30 PROCEDURE — 85025 COMPLETE CBC W/AUTO DIFF WBC: CPT | Performed by: NURSE PRACTITIONER

## 2020-03-30 PROCEDURE — 82803 BLOOD GASES ANY COMBINATION: CPT | Performed by: STUDENT IN AN ORGANIZED HEALTH CARE EDUCATION/TRAINING PROGRAM

## 2020-03-30 PROCEDURE — 93306 TTE W/DOPPLER COMPLETE: CPT | Mod: 26 | Performed by: INTERNAL MEDICINE

## 2020-03-30 PROCEDURE — 93306 TTE W/DOPPLER COMPLETE: CPT

## 2020-03-30 PROCEDURE — 84100 ASSAY OF PHOSPHORUS: CPT | Performed by: NURSE PRACTITIONER

## 2020-03-30 PROCEDURE — 25800030 ZZH RX IP 258 OP 636: Performed by: STUDENT IN AN ORGANIZED HEALTH CARE EDUCATION/TRAINING PROGRAM

## 2020-03-30 PROCEDURE — 25000132 ZZH RX MED GY IP 250 OP 250 PS 637: Mod: GY | Performed by: STUDENT IN AN ORGANIZED HEALTH CARE EDUCATION/TRAINING PROGRAM

## 2020-03-30 PROCEDURE — 27210429 ZZH NUTRITION PRODUCT INTERMEDIATE LITER

## 2020-03-30 PROCEDURE — 99291 CRITICAL CARE FIRST HOUR: CPT | Mod: GC | Performed by: INTERNAL MEDICINE

## 2020-03-30 RX ORDER — FUROSEMIDE 10 MG/ML
40 INJECTION INTRAMUSCULAR; INTRAVENOUS ONCE
Status: COMPLETED | OUTPATIENT
Start: 2020-03-30 | End: 2020-03-30

## 2020-03-30 RX ORDER — POTASSIUM CHLORIDE 1.5 G/1.58G
40 POWDER, FOR SOLUTION ORAL ONCE
Status: DISCONTINUED | OUTPATIENT
Start: 2020-03-30 | End: 2020-04-04 | Stop reason: CLARIF

## 2020-03-30 RX ADMIN — Medication: at 20:43

## 2020-03-30 RX ADMIN — HUMAN ALBUMIN MICROSPHERES AND PERFLUTREN 5 ML: 10; .22 INJECTION, SOLUTION INTRAVENOUS at 16:30

## 2020-03-30 RX ADMIN — SENNOSIDES AND DOCUSATE SODIUM 1 TABLET: 8.6; 5 TABLET ORAL at 20:43

## 2020-03-30 RX ADMIN — Medication: at 03:08

## 2020-03-30 RX ADMIN — MIDAZOLAM (PF) 1 MG/ML IN 0.9 % SODIUM CHLORIDE INTRAVENOUS SOLUTION 9 MG/HR: at 21:01

## 2020-03-30 RX ADMIN — DEXTROSE MONOHYDRATE 4 MCG/KG/MIN: 50 INJECTION, SOLUTION INTRAVENOUS at 20:43

## 2020-03-30 RX ADMIN — DEXTROSE MONOHYDRATE 5 MCG/KG/MIN: 50 INJECTION, SOLUTION INTRAVENOUS at 11:36

## 2020-03-30 RX ADMIN — ENOXAPARIN SODIUM 40 MG: 40 INJECTION SUBCUTANEOUS at 17:32

## 2020-03-30 RX ADMIN — Medication 150 MCG/HR: at 09:40

## 2020-03-30 RX ADMIN — MIDAZOLAM (PF) 1 MG/ML IN 0.9 % SODIUM CHLORIDE INTRAVENOUS SOLUTION 9 MG/HR: at 05:06

## 2020-03-30 RX ADMIN — FAMOTIDINE 20 MG: 20 TABLET ORAL at 20:43

## 2020-03-30 RX ADMIN — POLYETHYLENE GLYCOL 3350 17 G: 17 POWDER, FOR SOLUTION ORAL at 20:43

## 2020-03-30 RX ADMIN — POLYETHYLENE GLYCOL 3350 17 G: 17 POWDER, FOR SOLUTION ORAL at 08:30

## 2020-03-30 RX ADMIN — Medication: at 08:31

## 2020-03-30 RX ADMIN — SENNOSIDES AND DOCUSATE SODIUM 1 TABLET: 8.6; 5 TABLET ORAL at 08:30

## 2020-03-30 RX ADMIN — MIDAZOLAM (PF) 1 MG/ML IN 0.9 % SODIUM CHLORIDE INTRAVENOUS SOLUTION 9 MG/HR: at 15:05

## 2020-03-30 RX ADMIN — Medication: at 15:06

## 2020-03-30 RX ADMIN — POTASSIUM CHLORIDE 20 MEQ: 1.5 POWDER, FOR SOLUTION ORAL at 04:42

## 2020-03-30 RX ADMIN — DOXAZOSIN 1 MG: 1 TABLET ORAL at 08:30

## 2020-03-30 RX ADMIN — FUROSEMIDE 40 MG: 10 INJECTION, SOLUTION INTRAMUSCULAR; INTRAVENOUS at 11:15

## 2020-03-30 RX ADMIN — DEXTROSE MONOHYDRATE 5 MCG/KG/MIN: 50 INJECTION, SOLUTION INTRAVENOUS at 06:06

## 2020-03-30 RX ADMIN — FAMOTIDINE 20 MG: 20 TABLET ORAL at 08:30

## 2020-03-30 RX ADMIN — POTASSIUM CHLORIDE 20 MEQ: 1.5 POWDER, FOR SOLUTION ORAL at 17:32

## 2020-03-30 ASSESSMENT — ACTIVITIES OF DAILY LIVING (ADL)
ADLS_ACUITY_SCORE: 15

## 2020-03-30 NOTE — PLAN OF CARE
OT: Due to current medical status, patient not appropriate for occupational therapy today. Will hold and re-assess daily as appropriate

## 2020-03-30 NOTE — PROGRESS NOTES
MEDICAL ICU PROGRESS NOTE  03/30/2020      Date of Service (when I saw the patient): 03/30/2020    ASSESSMENT: Eduardo Kemp is a 75 YO M with a h/o HTN who was admitted for AHRF and developed ARDS 2/2 COVID-19 (positive on 03/23/20 at an OSH) and transferred to [facility] on 03/25/20 for participation in the Remdesivir clinical trial. He remains intubated, sedated, paralyzed and proned.     CHANGES and MAJOR THINGS TODAY:   - TWI on anterior lead overnight  - CXR and TTE when supinated  - Lasix, check BMP this afternoon  - Talk to nutrition about rate of TF  - Talk to ID about IL-6 elevated; ordered OH 25 VIt D  - Will update wife    PLAN:  NEURO/PSYCH  # Sedation  - Versed gtt 9, Versed prn     # Pain  - Fentanyl gtt to 150, and fentanyl prn      # Paralytics  S/p Vecuronium.  - Cistracurium gtt 5 - started 3/25 11PM     # Acute encephalopathy 2/2 sedatives  Patient initially presented weak following a syncopal event at home. A CT Head on 3/23 at OSH showed no acute intracranial hemorrhage or acute abnormality of the brain, and mild diffuse cerebral atrophy and chronic white matter ischemic change. Currently sedated.  - continue sedation     CARDIOVASCULAR  # Hemodynamics  Stable.      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 Trop negative this AM.  - obtain TTE this afternoon when supine    # Bradycardia  Cardiology at OSH consulted. Tele without evidence of pauses or heart block. Per cards note, bradycardia may be due to hypoxia, apnea with sleep and increased vagal tone, and his infection. TTE showing mild LV reduction 40-45%, global RV function reduction.  - telemetry     # Decreased RV function per TTE  Likely d/t ARDS.   - treat ARDS (below)    # Elevated Troponin  Initial trop <0.056. Trop on arrival 0.036. EKG on arrival showing sinus bradycardia. Troponin 3/28 <0.015  - Will check trop as part of COVID labs q48  hrs (next 3/30)     # H/o HTN  -Hold PTA amlodipine 5 mg   -Hold PTA hydrochlorothiazide 12.5 mg daily     PULMONARY  # ARDS  # Acute Hypoxic Respiratory Failure 2/2 COVID-19   Proned and paralyzed.   Ventilation Mode: CMV/AC  (Continuous Mandatory Ventilation/ Assist Control)  FiO2 (%): 60 %  Rate Set (breaths/minute): 25 breaths/min  Tidal Volume Set (mL): 400 mL  PEEP (cm H2O): 15 cmH2O  Oxygen Concentration (%): 60 % (found on)  Resp: 25  P:F: 136 following ABG this AM  Plateau Pressure: <30 (24-26)  - ABG at 5:30 pm; supine at 6pm; repeat ABG at 6:30 pm  - if P:F >150 then consider not proning again tonight  - if patient tolerates supine overnight, then consider stopping paralytic  - artifical tears  - 66 kg= ideal BW   - Keep lungs dry - (s/p Lasix for last 2 days); give Lasix 40 mg, check BMP in afternoon  - try to decrease FiO2 down (goal less than 60)  - CXR once supinated    GI  # Bowel Regimen  - Senna BID and Miralax BID  - 3/28 - x1 mag citrate  - 3/28 suppository      # Nutrition:   - nutrition consulted   - NJ placed   - will discuss rate of TF with nutrition     # Stress Ulcer prophylaxis: Famotidine     RENAL  # Hypokalemia  - K replacement protocol  - Mg and Phos replacement protocol  - BMP     # BPH  -PTA on tamsulosin 0.4 mg daily;     # Volume - Euvolemic  S/p IVF at OSH in setting of initial dehydration. S/p Lasix 03/29/20 for net neg 1.5L     HEME/ONC  # Normocytic Anemia  Hgb 13, no signs of bleeding.   -Monitor.      # Transfusion History: none     ENDOCRINE  BG stable. HgbA1c 5.2.  - BG checks QID     INFECTIOUS DISEASE  # Sepsis  # COVID-19 Infection  Initially had extensive infectious work-up at OSH. C Diff neg, enteric panel neg. Flu A and B neg. Strep pneumo and Legionella neg. UA neg. CXR showing bilateral pulmonary infiltrates. COVID testing positive on 03/25/20. OSH discussed with MDTHOMAS.   - Infectious disease consulted; Remdesivir study participant  - Contact, droplet, airborne  precautions  - continue checking COVID labs: trop, CRP, ferritin q48 hrs (next 3/30)  - trend CBC with diff, CMP, CK  - no Tylenol or NSAIDS for fevers   - 03/28/20 Ferritin: 2,950 (from 1687) -> 03/30/20: 1726  - CRP 3/25 180 --> 3/26 250 --> 3/28;  03/30/20 260 -> 240  - 03/29/20 D-dimer: 2.2 -> 2.9  - 03/29/20 CK: 60   - 03/29/20 LDH: 452  - 03/29/20 IL-6: 202; talk to ID  - 03/29/20 Pro-BNP: 161  = space-out COVID labs to 48-72 hrs    # Antimicrobials  Ceftriaxone 2 g IV, 5 day course, duration until 03/27  Azithromycin 250 mg every day, 5 day course, duration until 03/27     # Cultures  -OSH BC 3/23 - 1/1 staph epidermidis  - Blood culture 03/25/20: NGTD after 5 days       SKIN/MSK  PT and OT consult    General Cares/Prophylaxis:    DVT Prophylaxis: Enoxaparin (Lovenox) SQ  GI Prophylaxis: PPI  Restraints: none  Family Communication: will call wife    Lines/tubes/drains:  RIJ and R brachial artery               ETT, OG     Disposition:  - Medical ICU     The patient was seen and discussed with Dr. Reynoso, attending physician.     Krzysztof Owens Jr, MD  Int Med PGY1  [phone]    ====================================  INTERVAL HISTORY:   Overnight, patient had small BM  NJ in good position, continued TF's.    OBJECTIVE:   1. VITAL SIGNS:   Temp:  [96.6  F (35.9  C)-100.9  F (38.3  C)] 99.3  F (37.4  C)  Heart Rate:  [64-98] 73  Resp:  [25-35] 25  MAP:  [70 mmHg-107 mmHg] 94 mmHg  Arterial Line BP: ()/(51-94) 130/69  FiO2 (%):  [55 %-100 %] 60 %  SpO2:  [89 %-98 %] 97 %  Ventilation Mode: CMV/AC  (Continuous Mandatory Ventilation/ Assist Control)  FiO2 (%): 60 %  Rate Set (breaths/minute): 25 breaths/min  Tidal Volume Set (mL): 400 mL  PEEP (cm H2O): 15 cmH2O  Oxygen Concentration (%): 60 % (found on)  Resp: 25    2. INTAKE/ OUTPUT:   I/O last 3 completed shifts:  In: 2146.45 [I.V.:746.45; Other:325; NG/GT:445]  Out: 2875 [Urine:2325; Stool:550]  Net 24 hrs: - 860 cc  Since admit: - 700 cc     Drips:  -  Paralytic: Cis 5 mcg/kg/min   - Analgesia: Fentanyl 150 mcg/hr  - Sedation: versed 9 mL/hr    3. PHYSICAL EXAMINATION:  General: proned man in bed  HEENT: NC, AT, MMM  Neuro: sedated, paralyzed  Pulm/Resp: synchronous with vent  CV: bradycardic-low normal rate, normal rhythm on tele  Abdomen: did not evaluate  : did not evaluate  Incisions/Skin: no visible rashes    4. LABS:   Arterial Blood Gases   Recent Labs   Lab 03/30/20  0545 03/30/20  0313 03/30/20  0141 03/30/20  0014   PH 7.44 7.45 7.47* 7.47*   PCO2 48* 46* 44 45   PO2 109* 75* 88 60*   HCO3 32* 32* 32* 33*     Complete Blood Count   Recent Labs   Lab 03/30/20 0313 03/29/20 0316 03/28/20 0348 03/27/20  0412   WBC 5.3 5.4 5.3 5.2   HGB 12.7* 13.0* 12.8* 13.0*    171 142*  142* 119*     Basic Metabolic Panel  Recent Labs   Lab 03/30/20 0313 03/29/20  2038 03/29/20 0316 03/28/20  1731 03/28/20  0348     --  138 139 139   POTASSIUM 3.5 3.4 3.3* 3.4 3.5   CHLORIDE 105  --  104 107 108   CO2 32  --  30 28 26   BUN 26  --  18 18 16   CR 0.77  --  0.71 0.70 0.63*   *  --  109* 112* 101*     Liver Function Tests  Recent Labs   Lab 03/30/20 0313 03/29/20 0316 03/28/20  0348 03/27/20  0412  03/25/20  1447   AST 77* 120* 77* 51*   < > 49*   ALT 54 64 37 25   < > 28   ALKPHOS 53 51 44 41   < > 39*   BILITOTAL 0.5 0.4 0.4 0.3   < > 0.4   ALBUMIN 1.7* 1.7* 1.6* 1.8*   < > 2.1*   INR  --   --   --   --   --  1.11    < > = values in this interval not displayed.     Pancreatic Enzymes  No lab results found in last 7 days.  Coagulation Profile  Recent Labs   Lab 03/25/20  1447   INR 1.11     5. RADIOLOGY:   No results found for this or any previous visit (from the past 24 hour(s)).  03/25/20 TTE  Mildly (EF 40-45%) reduced left ventricular function is present.  Global right ventricular function is mildly reduced.  Unable to assess mean RA pressure given the patient is on a ventilator.  No pericardial effusion is present.  There is no prior study  for direct comparison.    =========================================

## 2020-03-30 NOTE — PLAN OF CARE
ICU End of Shift Summary. See flowsheets for vital signs and detailed assessment.    Changes this shift: Had high oxygen requirements overnight, peep increased to 12 and then to 15. Reproned at 0100. FiO2 at 100% at the beginning of the night, this morning have been able to start weaning more, FiO2 now at 60%. Paralytic restarted for proning. Prior to restarting the paralytic, he slightly withdrew to pain, and would overbreathe the vent with some intermittent dyssynchrony. Versed increased to 9 prior to restarting paralytic, fentanyl remains at 150. Noted new t wave inversion on tele, EKG completed. Potassium replaced per protocol. Advancing tube feeds per order.    Plan: Continue to wean FiO2 and peep as tolerated. Notify team of changes.

## 2020-03-30 NOTE — PLAN OF CARE
ICU End of Shift Summary. See flowsheets for vital signs and detailed assessment.    Changes this shift: Pt remains in deep sedation with versed at 9 and fent at 150; paralytic weaned to 3 with 4/4 twitches on TOF; supinated at 1600 to complete ECHO and CXR- did not tolerate turn and required 100% FiO2 for an hour but recovered by 1700 and able to start weaning back down on the oxygen- will prone again this evening; ETT needs to be pulled 1cm back once pt is prone again; Hemodynamically stable; Al and rectal tube in place- diuresed and continue bowel regimen; patient is on remdesivir trial- please  call number on sheet with any questions    Plan: Prone again this evening; continue to monitor    Problem: ARDS (Acute Respiratory Distress Syndrome)  Goal: Effective Oxygenation  Outcome: No Change     Problem: Cardiac Disease Comorbidity  Goal: Cardiac Disease  Description: Patient comorbidity will be monitored for signs and symptoms of Cardiac Disease.  Problems will be absent, minimized or managed by discharge/transition of care.  Outcome: No Change

## 2020-03-31 LAB
ALBUMIN SERPL-MCNC: 1.6 G/DL (ref 3.4–5)
ALP SERPL-CCNC: 57 U/L (ref 40–150)
ALT SERPL W P-5'-P-CCNC: 45 U/L (ref 0–70)
ANION GAP SERPL CALCULATED.3IONS-SCNC: 4 MMOL/L (ref 3–14)
AST SERPL W P-5'-P-CCNC: 56 U/L (ref 0–45)
BACTERIA SPEC CULT: NO GROWTH
BACTERIA SPEC CULT: NO GROWTH
BASE EXCESS BLDA CALC-SCNC: 7.4 MMOL/L
BASE EXCESS BLDA CALC-SCNC: 7.8 MMOL/L
BASE EXCESS BLDA CALC-SCNC: 8.3 MMOL/L
BASE EXCESS BLDA CALC-SCNC: 8.8 MMOL/L
BILIRUB SERPL-MCNC: 0.4 MG/DL (ref 0.2–1.3)
BUN SERPL-MCNC: 28 MG/DL (ref 7–30)
CALCIUM SERPL-MCNC: 8.2 MG/DL (ref 8.5–10.1)
CHLORIDE SERPL-SCNC: 106 MMOL/L (ref 94–109)
CK SERPL-CCNC: 46 U/L (ref 30–300)
CO2 SERPL-SCNC: 32 MMOL/L (ref 20–32)
CREAT SERPL-MCNC: 0.73 MG/DL (ref 0.66–1.25)
CREAT SERPL-MCNC: 0.73 MG/DL (ref 0.66–1.25)
DEPRECATED CALCIDIOL+CALCIFEROL SERPL-MC: <34 UG/L (ref 20–75)
ERYTHROCYTE [DISTWIDTH] IN BLOOD BY AUTOMATED COUNT: 13.8 % (ref 10–15)
GFR SERPL CREATININE-BSD FRML MDRD: >90 ML/MIN/{1.73_M2}
GFR SERPL CREATININE-BSD FRML MDRD: >90 ML/MIN/{1.73_M2}
GLUCOSE BLDC GLUCOMTR-MCNC: 109 MG/DL (ref 70–99)
GLUCOSE BLDC GLUCOMTR-MCNC: 124 MG/DL (ref 70–99)
GLUCOSE BLDC GLUCOMTR-MCNC: 124 MG/DL (ref 70–99)
GLUCOSE SERPL-MCNC: 146 MG/DL (ref 70–99)
HCO3 BLD-SCNC: 33 MMOL/L (ref 21–28)
HCO3 BLD-SCNC: 33 MMOL/L (ref 21–28)
HCO3 BLD-SCNC: 34 MMOL/L (ref 21–28)
HCO3 BLD-SCNC: 34 MMOL/L (ref 21–28)
HCT VFR BLD AUTO: 39.3 % (ref 40–53)
HGB BLD-MCNC: 12.5 G/DL (ref 13.3–17.7)
INR PPP: 1.29 (ref 0.86–1.14)
Lab: NORMAL
MCH RBC QN AUTO: 30.3 PG (ref 26.5–33)
MCHC RBC AUTO-ENTMCNC: 31.8 G/DL (ref 31.5–36.5)
MCV RBC AUTO: 95 FL (ref 78–100)
O2/TOTAL GAS SETTING VFR VENT: 50 %
O2/TOTAL GAS SETTING VFR VENT: 65 %
O2/TOTAL GAS SETTING VFR VENT: 80 %
O2/TOTAL GAS SETTING VFR VENT: 85 %
PCO2 BLD: 47 MM HG (ref 35–45)
PCO2 BLD: 47 MM HG (ref 35–45)
PCO2 BLD: 49 MM HG (ref 35–45)
PCO2 BLD: 52 MM HG (ref 35–45)
PH BLD: 7.42 PH (ref 7.35–7.45)
PH BLD: 7.44 PH (ref 7.35–7.45)
PH BLD: 7.46 PH (ref 7.35–7.45)
PH BLD: 7.47 PH (ref 7.35–7.45)
PLATELET # BLD AUTO: 186 10E9/L (ref 150–450)
PO2 BLD: 115 MM HG (ref 80–105)
PO2 BLD: 74 MM HG (ref 80–105)
PO2 BLD: 81 MM HG (ref 80–105)
PO2 BLD: 81 MM HG (ref 80–105)
POTASSIUM SERPL-SCNC: 3.5 MMOL/L (ref 3.4–5.3)
PROT SERPL-MCNC: 5.9 G/DL (ref 6.8–8.8)
RBC # BLD AUTO: 4.12 10E12/L (ref 4.4–5.9)
SODIUM SERPL-SCNC: 142 MMOL/L (ref 133–144)
SPECIMEN SOURCE: NORMAL
SPECIMEN SOURCE: NORMAL
VITAMIN D2 SERPL-MCNC: <5 UG/L
VITAMIN D3 SERPL-MCNC: 29 UG/L
WBC # BLD AUTO: 5.7 10E9/L (ref 4–11)

## 2020-03-31 PROCEDURE — 82803 BLOOD GASES ANY COMBINATION: CPT | Performed by: STUDENT IN AN ORGANIZED HEALTH CARE EDUCATION/TRAINING PROGRAM

## 2020-03-31 PROCEDURE — 85610 PROTHROMBIN TIME: CPT | Performed by: STUDENT IN AN ORGANIZED HEALTH CARE EDUCATION/TRAINING PROGRAM

## 2020-03-31 PROCEDURE — 25000128 H RX IP 250 OP 636: Performed by: NURSE PRACTITIONER

## 2020-03-31 PROCEDURE — 99291 CRITICAL CARE FIRST HOUR: CPT | Mod: GC | Performed by: INTERNAL MEDICINE

## 2020-03-31 PROCEDURE — 25800030 ZZH RX IP 258 OP 636: Performed by: STUDENT IN AN ORGANIZED HEALTH CARE EDUCATION/TRAINING PROGRAM

## 2020-03-31 PROCEDURE — 40000014 ZZH STATISTIC ARTERIAL MONITORING DAILY

## 2020-03-31 PROCEDURE — 40000275 ZZH STATISTIC RCP TIME EA 10 MIN

## 2020-03-31 PROCEDURE — 80053 COMPREHEN METABOLIC PANEL: CPT | Performed by: STUDENT IN AN ORGANIZED HEALTH CARE EDUCATION/TRAINING PROGRAM

## 2020-03-31 PROCEDURE — 00000146 ZZHCL STATISTIC GLUCOSE BY METER IP

## 2020-03-31 PROCEDURE — 25000128 H RX IP 250 OP 636: Performed by: STUDENT IN AN ORGANIZED HEALTH CARE EDUCATION/TRAINING PROGRAM

## 2020-03-31 PROCEDURE — 25000132 ZZH RX MED GY IP 250 OP 250 PS 637: Mod: GY | Performed by: INTERNAL MEDICINE

## 2020-03-31 PROCEDURE — 25000128 H RX IP 250 OP 636: Performed by: INTERNAL MEDICINE

## 2020-03-31 PROCEDURE — 25000132 ZZH RX MED GY IP 250 OP 250 PS 637: Mod: GY | Performed by: STUDENT IN AN ORGANIZED HEALTH CARE EDUCATION/TRAINING PROGRAM

## 2020-03-31 PROCEDURE — 20000004 ZZH R&B ICU UMMC

## 2020-03-31 PROCEDURE — 25000125 ZZHC RX 250: Performed by: STUDENT IN AN ORGANIZED HEALTH CARE EDUCATION/TRAINING PROGRAM

## 2020-03-31 PROCEDURE — 94003 VENT MGMT INPAT SUBQ DAY: CPT

## 2020-03-31 PROCEDURE — 25000132 ZZH RX MED GY IP 250 OP 250 PS 637: Mod: GY | Performed by: NURSE PRACTITIONER

## 2020-03-31 PROCEDURE — 85027 COMPLETE CBC AUTOMATED: CPT | Performed by: STUDENT IN AN ORGANIZED HEALTH CARE EDUCATION/TRAINING PROGRAM

## 2020-03-31 PROCEDURE — 82550 ASSAY OF CK (CPK): CPT | Performed by: STUDENT IN AN ORGANIZED HEALTH CARE EDUCATION/TRAINING PROGRAM

## 2020-03-31 RX ORDER — PROPOFOL 10 MG/ML
5-75 INJECTION, EMULSION INTRAVENOUS CONTINUOUS
Status: DISCONTINUED | OUTPATIENT
Start: 2020-03-31 | End: 2020-04-02

## 2020-03-31 RX ORDER — FUROSEMIDE 10 MG/ML
40 INJECTION INTRAMUSCULAR; INTRAVENOUS ONCE
Status: COMPLETED | OUTPATIENT
Start: 2020-03-31 | End: 2020-03-31

## 2020-03-31 RX ADMIN — MIDAZOLAM 2 MG: 1 INJECTION INTRAMUSCULAR; INTRAVENOUS at 15:47

## 2020-03-31 RX ADMIN — MIDAZOLAM 2 MG: 1 INJECTION INTRAMUSCULAR; INTRAVENOUS at 13:00

## 2020-03-31 RX ADMIN — Medication: at 04:12

## 2020-03-31 RX ADMIN — MIDAZOLAM (PF) 1 MG/ML IN 0.9 % SODIUM CHLORIDE INTRAVENOUS SOLUTION 6 MG/HR: at 21:24

## 2020-03-31 RX ADMIN — MULTIVITAMIN 15 ML: LIQUID ORAL at 09:05

## 2020-03-31 RX ADMIN — Medication: at 08:15

## 2020-03-31 RX ADMIN — FAMOTIDINE 20 MG: 20 TABLET ORAL at 20:10

## 2020-03-31 RX ADMIN — MIDAZOLAM 2 MG: 1 INJECTION INTRAMUSCULAR; INTRAVENOUS at 18:28

## 2020-03-31 RX ADMIN — FAMOTIDINE 20 MG: 20 TABLET ORAL at 08:15

## 2020-03-31 RX ADMIN — ENOXAPARIN SODIUM 40 MG: 40 INJECTION SUBCUTANEOUS at 18:13

## 2020-03-31 RX ADMIN — DEXTROSE MONOHYDRATE 3 MCG/KG/MIN: 50 INJECTION, SOLUTION INTRAVENOUS at 06:24

## 2020-03-31 RX ADMIN — FENTANYL CITRATE 50 MCG: 50 INJECTION, SOLUTION INTRAMUSCULAR; INTRAVENOUS at 18:28

## 2020-03-31 RX ADMIN — POTASSIUM CHLORIDE 20 MEQ: 29.8 INJECTION, SOLUTION INTRAVENOUS at 08:16

## 2020-03-31 RX ADMIN — Medication 250 MCG/HR: at 15:12

## 2020-03-31 RX ADMIN — FUROSEMIDE 40 MG: 10 INJECTION, SOLUTION INTRAMUSCULAR; INTRAVENOUS at 10:30

## 2020-03-31 RX ADMIN — MIDAZOLAM (PF) 1 MG/ML IN 0.9 % SODIUM CHLORIDE INTRAVENOUS SOLUTION 10 MG/HR: at 08:24

## 2020-03-31 RX ADMIN — Medication: at 22:50

## 2020-03-31 RX ADMIN — Medication 150 MCG/HR: at 01:41

## 2020-03-31 RX ADMIN — DOXAZOSIN 1 MG: 1 TABLET ORAL at 08:15

## 2020-03-31 RX ADMIN — Medication: at 15:47

## 2020-03-31 RX ADMIN — MIDAZOLAM 2 MG: 1 INJECTION INTRAMUSCULAR; INTRAVENOUS at 08:26

## 2020-03-31 ASSESSMENT — ACTIVITIES OF DAILY LIVING (ADL)
ADLS_ACUITY_SCORE: 15

## 2020-03-31 NOTE — PROGRESS NOTES
[facility] ID Service: Brief note     Patient:  Eduardo Kemp  Date of birth 1945  Medical record number [id number]  Consult Requested by: MICU Team         Assessment and Recommendations:   Problem List:  1) COVID-19 - Dx 3/23/2020  2) Acute hypoxic respiratory failure - requiring ventilator , pronation , paralytic  3)  Bilateral pneumonia - CXR - Bilateral diffuse interstitial and airspace opacities with basilar predminance  4) Thrombocytopenia - improved   5) Elevated CK --> normalized   6) Minimally elevated troponin  7) Bradycardia  8) S. epidermidis in blood culture (3/23) -1/1 positive, neg x2/2 (3/25)   9) Hx of HTN - controlled   10. Elevated  (3/25) ---> 250 (3/26) --> 240 (3/30)    Recommendations:  - enrolled in Remdesivir clinical trial and study drug per clinical research team  - check CRP, ferintin. IL-6, D-Dimer , LDH  every 3 days  - monitoring for cytokine release syndrome  - completed Ceftriaxone/Azithromycin. If fever, consider broaden coverage with Pip/Tazobactam  - monitor for signs of aspiration    - check 25-OH vitamin D and replace if low     Discussion:  Eduardo Kemp is a 74 year old male with HTN and remote history of tobacco use who was admitted to [facility] on 3/25 with COVID-19. Initially presented to [facility] on 3/23 with weakness, fever to 102.3, shortness of breath, and diarrhea for four days prior in setting of travel to [place]. He was subsequently admitted with COVID-19 testing sent on admission and he was started on cefdinir and azithromycin for possible LLL pneumonia. Early 3/25 he had respiratory decompensation with shortness of breath, increased work of breathing, and hypoxia requiring intubation following which he was transferred first to [facility] and then to [facility]. Notable laboratory abnormalities lymphocytopenia with jak  (3/23), thrombocytopenia with jak 68 (3/24),  (3/25), and troponin 0.056 (3/25).    He is  currently enrolled in remdesivir clinical trial. Given bilateral  infiltrate and hypoxic respiratory failure would complete antibiotic course for community acquired pneumonia as well. Appreciate supportive care measures provided by MICU team.    ID will continue to follow peripherally. Discussed with Primary team      Douglas Lake MD,M.Med.Sc.  Infectious Diseases  Pager: [phone]          History of Present Illness:   Eduardo Kemp is a 74 year old male with pertinent comorbid conditions of HTN who was admitted on 3/25/2020 as transfer for Covid-19 clinical trial and ID consulted for assistance with management.      Initially had symptoms of weakness and fatigue since travel to [place] about four days prior to admission at OSH. Seen on 3/21 in ED with dehydration and discharged then admitted to [facility] 3/23 with weakness, fever (102), shortness of breath and diarrhea at which time flu was negative, covid sent, and cxr with left lung base patchy infiltrate. At admission SpO2 was in 80s and temp 102.3 Concern for CAP and started on azithromycin and cefdinir. Admission labs notable for mild lymphopenia and thrombocytopenia as well as hyponatremia and slightly elevated creatinine. Blood cultures with 1/2 positive for GPC identified as Staph epi and considered contaminant. Did have bradycardia to 30s with plan for nonurgent TTE at some point from cardiology. Overnight 3/24 - 3/25 noted to become more short of breath around 0200 and initially placed on 9L O2 via oxy mask. ABG done and 7.4/33/33 and subsequently intubated around 3am then transferred to [facility] where arterial line and central line were placed. Ceftriaxone and azithromycin not continued upon arrival.           Review of Systems:   Complete ROS obtained, pertinent positives and negatives as above.       Past Medical and Surgical History:   HTN  BPH      Allergies:      Allergies   Allergen Reactions     Ace Inhibitors       Possible angioedema 7/15.            Current Antimicrobials:   Cefdinir 3/23 - 3/24  Azithromycin 3/23 - 3/24         Family History:   Cardiovascular disease noted in Care Everywhere (brother, father)         Social History:     Former smoker (20 pack years, quit 1980)  Alcohol use           Physical Exam:   Ranges forvital signs:  Temp:  [98.1  F (36.7  C)-99.7  F (37.6  C)] 98.8  F (37.1  C)  Heart Rate:  [67-80] 70  Resp:  [25-27] 25  MAP:  [70 mmHg-107 mmHg] 84 mmHg  Arterial Line BP: ()/(51-94) 114/63  FiO2 (%):  [50 %-100 %] 70 %  SpO2:  [88 %-98 %] 96 %    Exam: deferred to preserve PPE; patient intubated and sedated in ICU         Laboratory Data:   Reviewed via Care Everywhere.  3/23  WBC 3.9   -   HGB 14.6  PLT 73  INR 1.2  Na 131  Cr 1.21  UA small occult blood  D dimer 443    3/35  WBC 4.3  HGB 11  PLT 75  Trop 0.056  Na 137  Cr 0.77    ABG 7.4/33/33 --> 7.39/34/79    Culture data:  Covid + 3/23  Flu A/B - 3/23  Stool pcr - 3/23  Cdiff - 3/23  BCx +S epi (3/23) - presumed contaminant           Imaging:   CXR 3/25  IMPRESSION:   1.  Right IJ line terminates in the distal SVC 2.9 cm above the cavoatrial junction.  2.  Lines and tubes otherwise stable.  3.  Increased retrocardiac consolidation with bilateral asymmetric infiltrates again noted.    CXR 3/23  FINDINGS: Low lung volumes. There is patchy infiltrate seen at the left lung base with some infiltrate projecting over the heart and posteriorly on the lateral view. Findings likely reflect pneumonia. Right midlung opacity could also represent an area of pneumonia. No pleural effusion. Heart size is normal.    CT Head 3/23  IMPRESSION:  1.  No acute intracranial hemorrhage or acute abnormality of the brain.  2.  Mild diffuse cerebral atrophy and chronic white matter ischemic change.

## 2020-03-31 NOTE — PROGRESS NOTES
MEDICAL ICU PROGRESS NOTE  03/31/2020      Date of Service (when I saw the patient): 03/31/2020    ASSESSMENT: Eduardo Kemp is a 75 YO M with a h/o HTN who was admitted for AHRF and developed ARDS 2/2 COVID-19 (positive on 03/23/20 at an OSH) and transferred to [facility] on 03/25/20 for participation in the Remdesivir clinical trial. He remains intubated, sedated, paralyzed and proned.     CHANGES and MAJOR THINGS TODAY:   - Decreasing Versed, increased Fentanyl range  - Increased TV to 450  - Consider stopping paralytic this afternoon, if restarting paralytic then start Vecuronium  - Give 1x Lasix today     PLAN:  NEURO/PSYCH  # Sedation  BIS: 54  - Versed gtt 10, Versed prn (drop max to 8, wean back as tolerated after pulling off Cis)  - Propofol 5     # Pain  - Fentanyl gtt to 170, and fentanyl prn      # Paralytics  S/p Vecuronium.  - Cistracurium gtt 3 - started 3/25 11PM; attempt to stop today  - If paralytic needed, then start Vecuronium     # Acute encephalopathy 2/2 sedatives  Patient initially presented weak following a syncopal event at home. A CT Head on 3/23 at OSH showed no acute intracranial hemorrhage or acute abnormality of the brain, and mild diffuse cerebral atrophy and chronic white matter ischemic change. Currently sedated.  - continue sedation     CARDIOVASCULAR  # Hemodynamics  Stable.      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 Trop negative this AM.  - TTE: Borderline (EF 50-55%) reduced left ventricular function is present. Right ventricular function, chamber size, wall motion, and thickness are normal. This study was compared with the study from 3/26/2020: LVEF has improved slightly. Comparison is somewhat limited due to lack of contrast on the prior study.     # Bradycardia  Cardiology at OSH consulted. Tele without evidence of pauses or heart block. Per cards note, bradycardia may be due  to hypoxia, apnea with sleep and increased vagal tone, and his infection. TTE showing mild LV reduction 40-45%, global RV function reduction.  - telemetry     # Decreased RV function per TTE  Likely d/t ARDS.   - treat ARDS (below)    # Elevated Troponin  Initial trop <0.056. Trop on arrival 0.036. EKG on arrival showing sinus bradycardia. Troponin 3/28 <0.015  - Will check trop as part of COVID labs q48 hrs (next 3/30)     # H/o HTN  -Hold PTA amlodipine 5 mg   -Hold PTA hydrochlorothiazide 12.5 mg daily     PULMONARY  # ARDS  # Acute Hypoxic Respiratory Failure 2/2 COVID-19   Proned and paralyzed.   Ventilation Mode: CMV/AC  (Continuous Mandatory Ventilation/ Assist Control)  FiO2 (%): 50 %  Rate Set (breaths/minute): 25 breaths/min  Tidal Volume Set (mL): 400 mL  PEEP (cm H2O): 15 cmH2O  Oxygen Concentration (%): 50 %  Resp: 25  P:F:  162  PIP: 27-29   Plat: <30  Plateau Pressure: <30 (24-26)  - will supine early afternoon  - serial ABG's   - assess how patient does after reducing paralytic  - if P:F >150 then consider not proning again tonight  - artifical tears  - 66 kg= ideal BW   - Keep lungs dry; give 40 mg IV 1x    GI  # Bowel Regimen  - Senna BID and Miralax BID  - 3/28 - x1 mag citrate  - 3/28 suppository   -  Rectal pouch     # Nutrition:   - nutrition consulted   - NJ placed   - TF limited to 40/hr     # Stress Ulcer prophylaxis: Famotidine     RENAL  # Hypokalemia  - K replacement protocol  - Mg and Phos replacement protocol  - BMP     # BPH  -PTA on tamsulosin 0.4 mg daily;     # Volume - Euvolemic  S/p IVF at OSH in setting of initial dehydration. S/p Lasix 03/29/20 for net neg 1.5L     HEME/ONC  # Normocytic Anemia  Hgb 13, no signs of bleeding.   -Monitor.      # Transfusion History: none     ENDOCRINE  BG stable. HgbA1c 5.2.  - BG checks QID     INFECTIOUS DISEASE  # Sepsis  # COVID-19 Infection  Initially had extensive infectious work-up at OSH. C Diff neg, enteric panel neg. Flu A and B neg.  Strep pneumo and Legionella neg. UA neg. CXR showing bilateral pulmonary infiltrates. COVID testing positive on 03/25/20. OSH discussed with RACHELL.   - Infectious disease consulted; Remdesivir study participant  - Contact, droplet, airborne precautions  - continue checking COVID labs: trop, CRP, ferritin q48 hrs (next 3/30)  - trend CBC with diff, CMP, CK  - no Tylenol or NSAIDS for fevers   - 03/28/20 Ferritin: 2,950 (from 1687) -> 03/30/20: 1726  - CRP 3/25 180 --> 3/26 250 --> 3/28;  03/30/20 260 -> 240  - 03/29/20 D-dimer: 2.2 -> 2.9  - 03/29/20 CK: 60   - 03/29/20 LDH: 452  - 03/29/20 IL-6: 202; talk to ID  - 03/29/20 Pro-BNP: 161  = space-out COVID labs to 48-72 hrs    # Antimicrobials  Ceftriaxone 2 g IV, 5 day course, duration until 03/27  Azithromycin 250 mg every day, 5 day course, duration until 03/27     # Cultures  -OSH BC 3/23 - 1/1 staph epidermidis  - Blood culture 03/25/20: NGTD after 5 days     SKIN/MSK  PT and OT consult    General Cares/Prophylaxis:    DVT Prophylaxis: Enoxaparin (Lovenox) SQ  GI Prophylaxis: PPI  Restraints: none  Family Communication: will call wife    Lines/tubes/drains:  RIJ and R brachial artery               ETT, OG     Disposition:  - Medical ICU     The patient was seen and discussed with Dr. Reynoso, attending physician.     Krzysztof Owens Jr, MD  Internal Medicine PGY-1  [phone]    ====================================  INTERVAL HISTORY:   Proned at 10 am. Started Propofol. Given rectal pouch.  ABG overnight with PaO2 81 on 70% FiO2. P:F 132. Dropped FiO2 to 60%.  Later dropped to 50%.    OBJECTIVE:   1. VITAL SIGNS:   Temp:  [97.2  F (36.2  C)-100  F (37.8  C)] 97.2  F (36.2  C)  Heart Rate:  [67-82] 67  Resp:  [25] 25  BP: (122)/(77) 122/77  MAP:  [77 mmHg-298 mmHg] 103 mmHg  Arterial Line BP: (102-298)/() 114/99  FiO2 (%):  [50 %-100 %] 50 %  SpO2:  [88 %-98 %] 95 %  Ventilation Mode: CMV/AC  (Continuous Mandatory Ventilation/ Assist Control)  FiO2 (%): 50  %  Rate Set (breaths/minute): 25 breaths/min  Tidal Volume Set (mL): 400 mL  PEEP (cm H2O): 15 cmH2O  Oxygen Concentration (%): 50 %  Resp: 25    2. INTAKE/ OUTPUT:   I/O last 3 completed shifts:  In: 2552.2 [I.V.:907.2; Other:335; NG/GT:330]  Out: 2190 [Urine:2140; Stool:50]  Net 24 hrs: + 455 cc  Since admit: - 245 cc     Drips:  - Paralytic: Cis 3 mcg/kg/min   - Analgesia: Fentanyl 175 mcg/hr  - Sedation: versed 10 mL/hr                    Propofol: 5    3. PHYSICAL EXAMINATION:  General: proned man in bed  HEENT: NC, AT, MMM  Neuro: sedated, paralyzed  Pulm/Resp: synchronous with vent  CV: bradycardic-low normal rate, normal rhythm on tele  Abdomen: did not evaluate  : did not evaluate  Incisions/Skin: no visible rashes    4. LABS:   Arterial Blood Gases   Recent Labs   Lab 03/31/20  0402 03/30/20  2335 03/30/20  1842 03/30/20  1637   PH 7.44 7.43 7.47* 7.47*   PCO2 49* 50* 45 47*   PO2 81 93 81 80   HCO3 33* 33* 33* 34*     Complete Blood Count   Recent Labs   Lab 03/31/20  0402 03/30/20  0313 03/29/20  0316 03/28/20  0348   WBC 5.7 5.3 5.4 5.3   HGB 12.5* 12.7* 13.0* 12.8*    188 171 142*  142*     Basic Metabolic Panel  Recent Labs   Lab 03/31/20  0402 03/30/20  2341 03/30/20  1637 03/30/20  0313 03/29/20  2038 03/29/20  0316     --  141 139  --  138   POTASSIUM 3.5  --  3.4 3.5 3.4 3.3*   CHLORIDE 106  --  104 105  --  104   CO2 32  --  33* 32  --  30   BUN 28  --  28 26  --  18   CR 0.73 0.73 0.71 0.77  --  0.71   *  --  124* 131*  --  109*     Liver Function Tests  Recent Labs   Lab 03/31/20  0402 03/30/20  0313 03/29/20  0316 03/28/20  0348  03/25/20  1447   AST 56* 77* 120* 77*   < > 49*   ALT 45 54 64 37   < > 28   ALKPHOS 57 53 51 44   < > 39*   BILITOTAL 0.4 0.5 0.4 0.4   < > 0.4   ALBUMIN 1.6* 1.7* 1.7* 1.6*   < > 2.1*   INR 1.29*  --   --   --   --  1.11    < > = values in this interval not displayed.     Pancreatic Enzymes  No lab results found in last 7 days.  Coagulation  Profile  Recent Labs   Lab 20  0402 20  1447   INR 1.29* 1.11     5. RADIOLOGY:   Recent Results (from the past 24 hour(s))   XR Chest Port 1 View    Narrative    Exam: Chest x-ray, 1 view, 3/30/2020 4:24 PM    Indication: Assess lung fields, endotracheal tube, COVID patient  proning    Comparison: X-ray 3/26/2020    Findings:   Single frontal radiograph of the chest. ET tube projects roughly 4 cm  above the abe. Right IJ central venous catheter tip projects over  the mid SVC. No pneumothorax. Slight decrease in left pleural  effusion. Slight decrease in bilateral diffuse interstitial and  airspace opacities with basilar predominance. Unchanged retrocardiac  opacity. Cardiomediastinal silhouette continues to be of secured.      Impression    Impression:   1. ET tube projects roughly 4 cm above the abe.  2. Slight decrease in left pleural effusion and associated  atelectasis.  3. Unchanged retrocardiac opacity and slight decrease in basilar  predominant bilateral diffuse interstitial and airspace opacities,  ARDS vs edema vs infection.     I have personally reviewed the examination and initial interpretation  and I agree with the findings.    KIYA BAPTISTE MD   Echo Complete    Narrative    [id number]  [id number]  [id number]  [id number]^CHATO MARSH^JORGE^[facility],[place]  Echocardiography Laboratory  [address] [place] [id number]     Name: MAYRA CORTEZ  MRN: [id number]  : 1945  Study Date: 2020 04:03 PM  Age: 74 yrs  Gender: Male  Patient Location: [facility] [place], [place]  Reason For Study: Cardiomyopathy  Ordering Physician: JORGE REIS JR.  Referring Physician: ERNESTO DELACRUZ  Performed By: Tosha Blair RDCS  Weight: 235 lb  HR: 70  BP: 113/69 mmHg     _____________________________________________________________________________  __     Procedure  Complete Portable Echo Adult. Contrast Optison. Optison (NDC #3094-4653-77)  given intravenously.  Patient was given 5 ml mixture of 3 ml Optison and 6 ml  saline. 4 ml wasted.     _____________________________________________________________________________  __     Interpretation Summary  Borderline (EF 50-55%) reduced left ventricular function is present.  Right ventricular function, chamber size, wall motion, and thickness are  normal.  This study was compared with the study from 3/26/2020: LVEF has improved  slightly. Comparison is somewhat limited due to lack of contrast on the prior  study.        _____________________________________________________________________________  __        Left Ventricle  Left ventricular wall thickness is normal. Left ventricular size is normal.  Borderline (EF 50-55%) reduced left ventricular function is present. Left  ventricular diastolic function is indeterminate.     Right Ventricle  Right ventricular function, chamber size, wall motion, and thickness are  normal.     Mitral Valve  Mild mitral annular calcification is present.     Aortic Valve  Mild aortic valve calcification is present. On Doppler interrogation, there is  no significant stenosis or regurgitation.     Tricuspid Valve  The tricuspid valve is normal. Trace tricuspid insufficiency is present. The  peak velocity of the tricuspid regurgitant jet is not obtainable. Pulmonary  artery systolic pressure cannot be assessed.     Pulmonic Valve  The pulmonic valve is normal. Trace pulmonic insufficiency is present.        Vessels  The aorta root is normal. The pulmonary artery cannot be assessed. Dilation of  the inferior vena cava is present with abnormal respiratory variation in  diameter. Unable to assess mean RA pressure given the patient is on a  ventilator.     Pericardium  No pericardial effusion is present.     Compared to Previous Study  This study was compared with the study from 3/26/2020 . LVEF has improved  slightly. Comparison is somewhat limited due to lack of contrast on the  prior  study.  _____________________________________________________________________________  __     MMode/2D Measurements & Calculations     EF(MOD-bp): 50.6 %  TAPSE: 2.0 cm           _____________________________________________________________________________  __           Report approved by: Wellington Broussard 03/31/2020 08:11 AM        03/25/20 TTE  Mildly (EF 40-45%) reduced left ventricular function is present.  Global right ventricular function is mildly reduced.  Unable to assess mean RA pressure given the patient is on a ventilator.  No pericardial effusion is present.  There is no prior study for direct comparison.    =========================================

## 2020-03-31 NOTE — PLAN OF CARE
ICU End of Shift Summary. See flowsheets for vital signs and detailed assessment.    Changes this shift: Turned off Cis @ 0900, occasionally breathing over vent but remains heavily sedated on Versed @ 6 & Fentanyl @ 250; Propofol titrated off w/ paralytic. Supinated @ 1615 after flip needed to slowly increase FiO2 from 65% to 90% to keep sats > 90. ABG worsened within the hour--needed to re-prone @ 1830. Extremely swollen face & tongue-head gear changed but barely blanching underneath. Rectal tube placed for constant liquid stool and >5 changes of rectal pouches. K+ replaced.     Plan:  Re-proned after 2 hour of supination; continue sedation w/ out paralytic if patient is tolerating it. Continue study drug.

## 2020-03-31 NOTE — PLAN OF CARE
ICU End of Shift Summary. See flowsheets for vital signs and detailed assessment.    Changes this shift: Proned at 2200. Fio2 at 50%. Remains paralyzed on cis at 3, 3-4/4 on TOF. BIS monitor consistently reading >60 following patient movement and head turns, low dose propofol added with improvement. Liquid stool, rectal pouch replaced. Al with good urine output. Tmax 100 overnight.     Plan:  Potentially wean off paralytic today, optimize lung function

## 2020-03-31 NOTE — PROCEDURES
Small Bowel Feeding Tube Placement Assessment  Reason for Feeding Tube Placement: Prone patient needing post-pyloric feeding.  Cortrak Start Time: 1815  Cortrak End Time: 1845  Medicine Delivered During Procedure: lubricating jelly  Placement Successful: Yes.    Procedure Complications: None  Final Placement Shen at exit of nare: 100 cm  Face to Face time with patient:40 minutes    Bridle Placement:   Reason for bridle placement: NJ securement  Medicine delivered during procedure: lubricating jelly   Procedure: Successful   Location of top of clip on FT: @ 105 cm marker   Condition of nose/skin at time of bridle placement: Unremarkable  Face to Face time with patient: 5 minutes.

## 2020-04-01 ENCOUNTER — APPOINTMENT (OUTPATIENT)
Dept: ULTRASOUND IMAGING | Facility: CLINIC | Age: 75
DRG: 870 | End: 2020-04-01
Attending: INTERNAL MEDICINE
Payer: MEDICARE

## 2020-04-01 ENCOUNTER — APPOINTMENT (OUTPATIENT)
Dept: GENERAL RADIOLOGY | Facility: CLINIC | Age: 75
DRG: 870 | End: 2020-04-01
Attending: INTERNAL MEDICINE
Payer: MEDICARE

## 2020-04-01 LAB
ANION GAP SERPL CALCULATED.3IONS-SCNC: 5 MMOL/L (ref 3–14)
APTT PPP: 37 SEC (ref 22–37)
BASE EXCESS BLDA CALC-SCNC: 10.6 MMOL/L
BASE EXCESS BLDA CALC-SCNC: 12.2 MMOL/L
BASE EXCESS BLDA CALC-SCNC: 9.3 MMOL/L
BASE EXCESS BLDA CALC-SCNC: 9.8 MMOL/L
BASE EXCESS BLDA CALC-SCNC: 9.8 MMOL/L
BUN SERPL-MCNC: 29 MG/DL (ref 7–30)
CALCIUM SERPL-MCNC: 8.3 MG/DL (ref 8.5–10.1)
CHLORIDE SERPL-SCNC: 107 MMOL/L (ref 94–109)
CO2 SERPL-SCNC: 31 MMOL/L (ref 20–32)
CREAT SERPL-MCNC: 0.84 MG/DL (ref 0.66–1.25)
CRP SERPL-MCNC: 200 MG/L (ref 0–8)
D DIMER PPP FEU-MCNC: >20 UG/ML FEU (ref 0–0.5)
D DIMER PPP FEU-MCNC: >20 UG/ML FEU (ref 0–0.5)
ERYTHROCYTE [DISTWIDTH] IN BLOOD BY AUTOMATED COUNT: 13.8 % (ref 10–15)
FERRITIN SERPL-MCNC: 963 NG/ML (ref 26–388)
FIBRINOGEN PPP-MCNC: 513 MG/DL (ref 200–420)
GFR SERPL CREATININE-BSD FRML MDRD: 86 ML/MIN/{1.73_M2}
GLUCOSE BLDC GLUCOMTR-MCNC: 159 MG/DL (ref 70–99)
GLUCOSE SERPL-MCNC: 136 MG/DL (ref 70–99)
HCO3 BLD-SCNC: 34 MMOL/L (ref 21–28)
HCO3 BLD-SCNC: 34 MMOL/L (ref 21–28)
HCO3 BLD-SCNC: 35 MMOL/L (ref 21–28)
HCO3 BLD-SCNC: 35 MMOL/L (ref 21–28)
HCO3 BLD-SCNC: 36 MMOL/L (ref 21–28)
HCT VFR BLD AUTO: 36.9 % (ref 40–53)
HGB BLD-MCNC: 11.9 G/DL (ref 13.3–17.7)
INR PPP: 1.3 (ref 0.86–1.14)
LDH SERPL L TO P-CCNC: 330 U/L (ref 85–227)
LMWH PPP CHRO-ACNC: 0.12 IU/ML
MCH RBC QN AUTO: 31.2 PG (ref 26.5–33)
MCHC RBC AUTO-ENTMCNC: 32.2 G/DL (ref 31.5–36.5)
MCV RBC AUTO: 97 FL (ref 78–100)
O2/TOTAL GAS SETTING VFR VENT: 60 %
O2/TOTAL GAS SETTING VFR VENT: 60 %
O2/TOTAL GAS SETTING VFR VENT: 70 %
O2/TOTAL GAS SETTING VFR VENT: 75 %
O2/TOTAL GAS SETTING VFR VENT: 85 %
PCO2 BLD: 38 MM HG (ref 35–45)
PCO2 BLD: 44 MM HG (ref 35–45)
PCO2 BLD: 47 MM HG (ref 35–45)
PCO2 BLD: 49 MM HG (ref 35–45)
PCO2 BLD: 50 MM HG (ref 35–45)
PH BLD: 7.46 PH (ref 7.35–7.45)
PH BLD: 7.47 PH (ref 7.35–7.45)
PH BLD: 7.47 PH (ref 7.35–7.45)
PH BLD: 7.5 PH (ref 7.35–7.45)
PH BLD: 7.57 PH (ref 7.35–7.45)
PLATELET # BLD AUTO: 184 10E9/L (ref 150–450)
PO2 BLD: 133 MM HG (ref 80–105)
PO2 BLD: 63 MM HG (ref 80–105)
PO2 BLD: 89 MM HG (ref 80–105)
PO2 BLD: 93 MM HG (ref 80–105)
PO2 BLD: 93 MM HG (ref 80–105)
POTASSIUM SERPL-SCNC: 4 MMOL/L (ref 3.4–5.3)
RBC # BLD AUTO: 3.82 10E12/L (ref 4.4–5.9)
SODIUM SERPL-SCNC: 142 MMOL/L (ref 133–144)
TROPONIN I SERPL-MCNC: <0.015 UG/L (ref 0–0.04)
WBC # BLD AUTO: 6 10E9/L (ref 4–11)

## 2020-04-01 PROCEDURE — 85730 THROMBOPLASTIN TIME PARTIAL: CPT | Performed by: STUDENT IN AN ORGANIZED HEALTH CARE EDUCATION/TRAINING PROGRAM

## 2020-04-01 PROCEDURE — 82803 BLOOD GASES ANY COMBINATION: CPT | Performed by: STUDENT IN AN ORGANIZED HEALTH CARE EDUCATION/TRAINING PROGRAM

## 2020-04-01 PROCEDURE — 85379 FIBRIN DEGRADATION QUANT: CPT | Performed by: INTERNAL MEDICINE

## 2020-04-01 PROCEDURE — 82728 ASSAY OF FERRITIN: CPT | Performed by: STUDENT IN AN ORGANIZED HEALTH CARE EDUCATION/TRAINING PROGRAM

## 2020-04-01 PROCEDURE — 40000937 ZZHCL STATISTIC RESEARCH KIT COLLECTION: Performed by: INTERNAL MEDICINE

## 2020-04-01 PROCEDURE — 40000275 ZZH STATISTIC RCP TIME EA 10 MIN

## 2020-04-01 PROCEDURE — 25000132 ZZH RX MED GY IP 250 OP 250 PS 637: Mod: GY | Performed by: NURSE PRACTITIONER

## 2020-04-01 PROCEDURE — 83615 LACTATE (LD) (LDH) ENZYME: CPT | Performed by: STUDENT IN AN ORGANIZED HEALTH CARE EDUCATION/TRAINING PROGRAM

## 2020-04-01 PROCEDURE — 99291 CRITICAL CARE FIRST HOUR: CPT | Mod: GC | Performed by: INTERNAL MEDICINE

## 2020-04-01 PROCEDURE — 82306 VITAMIN D 25 HYDROXY: CPT | Performed by: STUDENT IN AN ORGANIZED HEALTH CARE EDUCATION/TRAINING PROGRAM

## 2020-04-01 PROCEDURE — 85027 COMPLETE CBC AUTOMATED: CPT | Performed by: STUDENT IN AN ORGANIZED HEALTH CARE EDUCATION/TRAINING PROGRAM

## 2020-04-01 PROCEDURE — 93970 EXTREMITY STUDY: CPT | Mod: XS

## 2020-04-01 PROCEDURE — 80048 BASIC METABOLIC PNL TOTAL CA: CPT | Performed by: STUDENT IN AN ORGANIZED HEALTH CARE EDUCATION/TRAINING PROGRAM

## 2020-04-01 PROCEDURE — 71045 X-RAY EXAM CHEST 1 VIEW: CPT

## 2020-04-01 PROCEDURE — 94003 VENT MGMT INPAT SUBQ DAY: CPT

## 2020-04-01 PROCEDURE — 85610 PROTHROMBIN TIME: CPT | Performed by: STUDENT IN AN ORGANIZED HEALTH CARE EDUCATION/TRAINING PROGRAM

## 2020-04-01 PROCEDURE — 25000132 ZZH RX MED GY IP 250 OP 250 PS 637: Mod: GY | Performed by: STUDENT IN AN ORGANIZED HEALTH CARE EDUCATION/TRAINING PROGRAM

## 2020-04-01 PROCEDURE — P9047 ALBUMIN (HUMAN), 25%, 50ML: HCPCS | Performed by: STUDENT IN AN ORGANIZED HEALTH CARE EDUCATION/TRAINING PROGRAM

## 2020-04-01 PROCEDURE — 25000132 ZZH RX MED GY IP 250 OP 250 PS 637: Mod: GY | Performed by: INTERNAL MEDICINE

## 2020-04-01 PROCEDURE — 40000014 ZZH STATISTIC ARTERIAL MONITORING DAILY

## 2020-04-01 PROCEDURE — 25000128 H RX IP 250 OP 636: Performed by: STUDENT IN AN ORGANIZED HEALTH CARE EDUCATION/TRAINING PROGRAM

## 2020-04-01 PROCEDURE — 27210429 ZZH NUTRITION PRODUCT INTERMEDIATE LITER

## 2020-04-01 PROCEDURE — 00000146 ZZHCL STATISTIC GLUCOSE BY METER IP

## 2020-04-01 PROCEDURE — 99001 SPECIMEN HANDLING PT-LAB: CPT | Performed by: INTERNAL MEDICINE

## 2020-04-01 PROCEDURE — 85379 FIBRIN DEGRADATION QUANT: CPT | Performed by: STUDENT IN AN ORGANIZED HEALTH CARE EDUCATION/TRAINING PROGRAM

## 2020-04-01 PROCEDURE — 25000128 H RX IP 250 OP 636: Performed by: NURSE PRACTITIONER

## 2020-04-01 PROCEDURE — 85520 HEPARIN ASSAY: CPT | Performed by: STUDENT IN AN ORGANIZED HEALTH CARE EDUCATION/TRAINING PROGRAM

## 2020-04-01 PROCEDURE — 84484 ASSAY OF TROPONIN QUANT: CPT | Performed by: STUDENT IN AN ORGANIZED HEALTH CARE EDUCATION/TRAINING PROGRAM

## 2020-04-01 PROCEDURE — 93970 EXTREMITY STUDY: CPT

## 2020-04-01 PROCEDURE — 85384 FIBRINOGEN ACTIVITY: CPT | Performed by: STUDENT IN AN ORGANIZED HEALTH CARE EDUCATION/TRAINING PROGRAM

## 2020-04-01 PROCEDURE — 20000004 ZZH R&B ICU UMMC

## 2020-04-01 PROCEDURE — 86140 C-REACTIVE PROTEIN: CPT | Performed by: STUDENT IN AN ORGANIZED HEALTH CARE EDUCATION/TRAINING PROGRAM

## 2020-04-01 RX ORDER — FUROSEMIDE 10 MG/ML
40 INJECTION INTRAMUSCULAR; INTRAVENOUS EVERY 8 HOURS
Status: COMPLETED | OUTPATIENT
Start: 2020-04-01 | End: 2020-04-02

## 2020-04-01 RX ORDER — ALBUMIN (HUMAN) 12.5 G/50ML
25 SOLUTION INTRAVENOUS EVERY 8 HOURS
Status: COMPLETED | OUTPATIENT
Start: 2020-04-01 | End: 2020-04-02

## 2020-04-01 RX ORDER — FUROSEMIDE 10 MG/ML
40 INJECTION INTRAMUSCULAR; INTRAVENOUS ONCE
Status: DISCONTINUED | OUTPATIENT
Start: 2020-04-01 | End: 2020-04-01

## 2020-04-01 RX ORDER — FUROSEMIDE 10 MG/ML
40 INJECTION INTRAMUSCULAR; INTRAVENOUS ONCE
Status: COMPLETED | OUTPATIENT
Start: 2020-04-01 | End: 2020-04-01

## 2020-04-01 RX ADMIN — Medication 200 MCG/HR: at 11:01

## 2020-04-01 RX ADMIN — Medication 1000 UNITS: at 18:25

## 2020-04-01 RX ADMIN — Medication 200 MCG/HR: at 23:13

## 2020-04-01 RX ADMIN — FUROSEMIDE 40 MG: 10 INJECTION, SOLUTION INTRAMUSCULAR; INTRAVENOUS at 15:39

## 2020-04-01 RX ADMIN — Medication: at 15:26

## 2020-04-01 RX ADMIN — DOXAZOSIN 1 MG: 1 TABLET ORAL at 07:59

## 2020-04-01 RX ADMIN — FENTANYL CITRATE 50 MCG: 50 INJECTION, SOLUTION INTRAMUSCULAR; INTRAVENOUS at 14:05

## 2020-04-01 RX ADMIN — Medication: at 22:31

## 2020-04-01 RX ADMIN — FENTANYL CITRATE 50 MCG: 50 INJECTION, SOLUTION INTRAMUSCULAR; INTRAVENOUS at 18:26

## 2020-04-01 RX ADMIN — FUROSEMIDE 40 MG: 10 INJECTION, SOLUTION INTRAMUSCULAR; INTRAVENOUS at 09:39

## 2020-04-01 RX ADMIN — MIDAZOLAM 2 MG: 1 INJECTION INTRAMUSCULAR; INTRAVENOUS at 20:30

## 2020-04-01 RX ADMIN — FENTANYL CITRATE 50 MCG: 50 INJECTION, SOLUTION INTRAMUSCULAR; INTRAVENOUS at 00:51

## 2020-04-01 RX ADMIN — Medication: at 05:29

## 2020-04-01 RX ADMIN — MIDAZOLAM (PF) 1 MG/ML IN 0.9 % SODIUM CHLORIDE INTRAVENOUS SOLUTION 7 MG/HR: at 11:02

## 2020-04-01 RX ADMIN — MIDAZOLAM 2 MG: 1 INJECTION INTRAMUSCULAR; INTRAVENOUS at 14:05

## 2020-04-01 RX ADMIN — Medication 250 MCG/HR: at 00:47

## 2020-04-01 RX ADMIN — ENOXAPARIN SODIUM 40 MG: 40 INJECTION SUBCUTANEOUS at 18:25

## 2020-04-01 RX ADMIN — MIDAZOLAM (PF) 1 MG/ML IN 0.9 % SODIUM CHLORIDE INTRAVENOUS SOLUTION 6 MG/HR: at 23:13

## 2020-04-01 RX ADMIN — MIDAZOLAM 2 MG: 1 INJECTION INTRAMUSCULAR; INTRAVENOUS at 11:03

## 2020-04-01 RX ADMIN — FENTANYL CITRATE 50 MCG: 50 INJECTION, SOLUTION INTRAMUSCULAR; INTRAVENOUS at 22:31

## 2020-04-01 RX ADMIN — MULTIVITAMIN 15 ML: LIQUID ORAL at 09:39

## 2020-04-01 RX ADMIN — FAMOTIDINE 20 MG: 20 TABLET ORAL at 19:53

## 2020-04-01 RX ADMIN — FAMOTIDINE 20 MG: 20 TABLET ORAL at 07:59

## 2020-04-01 RX ADMIN — FENTANYL CITRATE 50 MCG: 50 INJECTION, SOLUTION INTRAMUSCULAR; INTRAVENOUS at 11:03

## 2020-04-01 RX ADMIN — Medication: at 09:45

## 2020-04-01 RX ADMIN — ALBUMIN HUMAN 25 G: 0.25 SOLUTION INTRAVENOUS at 15:39

## 2020-04-01 ASSESSMENT — ACTIVITIES OF DAILY LIVING (ADL)
ADLS_ACUITY_SCORE: 15

## 2020-04-01 NOTE — PROGRESS NOTES
[facility] General ID Service: Brief note     Patient:  Eduardo Kemp  Date of birth 1945  Medical record number [id number]  Consult Requested by: MICU Team         Assessment and Recommendations:   Problem List:  1) COVID-19 - Dx 3/23/2020  2) Acute hypoxic respiratory failure - requiring ventilator, pronation, paralytic  3)  Bilateral pneumonia - CXR - Bilateral diffuse interstitial and airspace opacities with basilar predominance (last imaging-CXR on 03/30)  4) Thrombocytopenia -resolved; lymphocytopenia [id number] on 3/30  5) Elevated CK --> normalized   6) Minimally elevated troponin  7) Bradycardia-improved  8) S. epidermidis in blood culture (3/23) -1/1 positive, neg x2/2 (3/25)   9) Hx of HTN - controlled   10) Elevated  (3/25) ---> 250 (3/26) --> 240 (3/30)->200 (04/01)  11) elevated ferritin 2900 (3/25)-->1700 (3/30)-->963 (4/0)  12) Elevated D-Dimer>20.    Recommendations:  - enrolled in Remdesivir clinical trial and study drug per clinical research team  - Continue checking CRP, ferritin, IL-6, D-Dimer , LDH  every 3 days  - monitoring for cytokine release syndrome  - Consider broadening coverage with Pip/Tazobactam given  elevated temperature (completed Ceftriaxone/Azithromycin)  - monitor for signs of aspiration    - Recommend to replace vitamin D (total vitamin D reported as <34)    Discussion:  Eduardo Kemp is a 74 year old male with HTN and remote history of tobacco use who was admitted to [facility] on 3/25 with COVID-19. Initially presented to [facility] on 3/23 with weakness, fever to 102.3, shortness of breath, and diarrhea for four days prior in setting of travel to [place]. He was subsequently admitted with COVID-19 testing sent on admission and he was started on cefdinir and azithromycin for possible LLL pneumonia. Early 3/25 he had respiratory decompensation with shortness of breath, increased work of breathing, and hypoxia requiring intubation following which he  was transferred first to [facility] and then to [facility]. Notable laboratory abnormalities lymphocytopenia with jak  (3/23), thrombocytopenia with jak 68 (3/24),  (3/25), and troponin 0.056 (3/25).    He is currently enrolled in remdesivir clinical trial. Given bilateral  infiltrate and hypoxic respiratory failure would complete antibiotic course for community acquired pneumonia as well. Appreciate supportive care measures provided by MICU team.    ID will continue to follow peripherally. Discussed with Primary team      Muna Smith MD, ID fellow, pager [phone]    Attestation:  This patient has been seen and evaluated by me.  I discussed this patient with the fellow Dr Smith  and agree with the findings and plan in this note. I also personally edited this note to reflect my findings. I have reviewed today's vital signs, medications, labs and imaging.    Douglas Lake MD,M.Med.Sc.  Infectious Diseases  Pager: [phone]           Interval history:     Patient is not examined.  Chart check performed.  Patient remained intubated, paralyzed, in proned position.  Per nurse's report yesterday was supinated, but tolerated only for 1-1/2 hours, and was reproned again.  T-max was 38.1, increased from yesterday, BP stable.  CBC and BMP unremarkable.  Patient CRP improved to 200, LDH-338, CK normalized at 46, fibrinogen-513, ferritin downtrending- 963 today from 2900 on admission. D-dimer>20.  IL6 not checked today.         History of Present Illness:   Eduardo Kemp is a 74 year old male with pertinent comorbid conditions of HTN who was admitted on 3/25/2020 as transfer for Covid-19 clinical trial and ID consulted for assistance with management.      Initially had symptoms of weakness and fatigue since travel to [place] about four days prior to admission at OSH. Seen on 3/21 in ED with dehydration and discharged then admitted to [facility] 3/23 with weakness, fever  (102), shortness of breath and diarrhea at which time flu was negative, covid sent, and cxr with left lung base patchy infiltrate. At admission SpO2 was in 80s and temp 102.3 Concern for CAP and started on azithromycin and cefdinir. Admission labs notable for mild lymphopenia and thrombocytopenia as well as hyponatremia and slightly elevated creatinine. Blood cultures with 1/2 positive for GPC identified as Staph epi and considered contaminant. Did have bradycardia to 30s with plan for nonurgent TTE at some point from cardiology. Overnight 3/24 - 3/25 noted to become more short of breath around 0200 and initially placed on 9L O2 via oxy mask. ABG done and 7.4/33/33 and subsequently intubated around 3am then transferred to [facility] where arterial line and central line were placed. Ceftriaxone and azithromycin not continued upon arrival.           Review of Systems:   Complete ROS obtained, pertinent positives and negatives as above.       Past Medical and Surgical History:   HTN  BPH      Allergies:      Allergies   Allergen Reactions     Ace Inhibitors      Possible angioedema 7/15.            Current Antimicrobials:   Cefdinir 3/23 - 3/24  Azithromycin 3/23 - 3/27  Ceftriaxone 3/24-3/29         Family History:   Cardiovascular disease noted in Care Everywhere (brother, father)         Social History:     Former smoker (20 pack years, quit 1980)  Alcohol use           Physical Exam:   Ranges forvital signs:  Temp:  [98.6  F (37  C)-100.6  F (38.1  C)] 99.9  F (37.7  C)  Heart Rate:  [72-90] 74  Resp:  [25] 25  MAP:  [76 mmHg-103 mmHg] 79 mmHg  Arterial Line BP: (103-147)/(57-79) 105/61  FiO2 (%):  [60 %-90 %] 60 %  SpO2:  [89 %-100 %] 94 %    Exam: deferred to preserve PPE; patient intubated and sedated in ICU         Laboratory Data:   Reviewed via Care Everywhere.  3/23  WBC 3.9   -   HGB 14.6  PLT 73  INR 1.2  Na 131  Cr 1.21  UA small occult blood  D dimer 443    3/25  WBC 4.3  HGB 11  PLT  75  Trop 0.056  Na 137  Cr 0.77    ABG 7.4/33/33 --> 7.39/34/79    Culture data:  Covid + 3/23  Flu A/B - 3/23  Stool pcr - 3/23  Cdiff - 3/23  BCx +S epi (3/23) - presumed contaminant  BCx 3/25-negative           Imaging:   CXR 3/30  Impression:   1. ET tube projects roughly 4 cm above the abe.  2. Slight decrease in left pleural effusion and associated  atelectasis.  3. Unchanged retrocardiac opacity and slight decrease in basilar  predominant bilateral diffuse interstitial and airspace opacities,  ARDS vs edema vs infection.     ECHO Portable 3/30  Interpretation Summary:  Borderline (EF 50-55%) reduced left ventricular function is present.  Right ventricular function, chamber size, wall motion, and thickness are  normal.  This study was compared with the study from 3/26/2020: LVEF has improved  slightly. Comparison is somewhat limited due to lack of contrast on the prior  study.    CXR 3/25  IMPRESSION:   1.  Right IJ line terminates in the distal SVC 2.9 cm above the cavoatrial junction.  2.  Lines and tubes otherwise stable.  3.  Increased retrocardiac consolidation with bilateral asymmetric infiltrates again noted.    CXR 3/23  FINDINGS: Low lung volumes. There is patchy infiltrate seen at the left lung base with some infiltrate projecting over the heart and posteriorly on the lateral view. Findings likely reflect pneumonia. Right midlung opacity could also represent an area of pneumonia. No pleural effusion. Heart size is normal.    CT Head 3/23  IMPRESSION:  1.  No acute intracranial hemorrhage or acute abnormality of the brain.  2.  Mild diffuse cerebral atrophy and chronic white matter ischemic change.

## 2020-04-01 NOTE — PLAN OF CARE
ICU End of Shift Summary. See flowsheets for vital signs and detailed assessment.    Changes this shift: Pt has remained off of paralytic >24 hours (off @ 0900 3/31). Supinated @ 1415 this afternoon and vent changes were made (increased VT up to 480 & PEEP to 16). Pt tolerating w/ same FiO2 that was being used while proned @ 60%. US of BUE & BLE to r/o DVT for elevated D-Dimer this AM. ID following, low threshold to restart IV abx if patient is spiking fevers (remained between 37-38 degrees today via bladder probe). Slowly weaning Versed & Fentanyl. Albumin & lasix scheduled q8h.     Plan:  Assess need to re-prone, keep comfortable w/ Fentanyl & Versed. Trend ABGs.

## 2020-04-01 NOTE — PLAN OF CARE
ICU End of Shift Summary. See flowsheets for vital signs and detailed assessment.    Changes this shift: Labile O2 requirements, %. Remained proned overnight. Began coughing and overbreathing with head turns resulting in desat episodes, increased versed and given fent bolus with improvement in oxygenation. Al and rectal tube in place. BPs stable. Very difficult to position head/face off pressure points while proned, increased oral, tongue and periorbital swelling, providers aware. DDimer critical this AM at >20.0. Providers notified as needed throughout shift.    Plan:  Continue with supportive cares

## 2020-04-01 NOTE — PROGRESS NOTES
MEDICAL ICU PROGRESS NOTE  04/01/2020      Date of Service (when I saw the patient): 04/01/2020    ASSESSMENT: Eduardo Kemp is a 73 YO M with a h/o HTN who was admitted for AHRF and developed ARDS 2/2 COVID-19 (positive on 03/23/20 at an OSH) and transferred to [facility] on 03/25/20 for participation in the Remdesivir clinical trial. He remains intubated, sedated, paralyzed and proned.     CHANGES and MAJOR THINGS TODAY:   - Discussed with ID: will repeat IL-6, consider Zosyn if decompensating  - Elevated D-dimer, ordered US of upper and lower extremities b/l, consider heparin gtt  - Supinate at 2pm  - CXR with increased effusion and opacities; ordered lasix 2x and albumin, s/p 1x lasix in AM  - Increased PEEP to 16 and TV to 480 when supinated  - Vit D replacement    PLAN:  NEURO/PSYCH  # Sedation  - Versed gtt, Versed prn   - stopped Propofol 03/31/20     # Pain  - Fentanyl gtt, and fentanyl prn      # Paralytics  Off of paralytics.  - can start Vecuronium if needed    # Acute encephalopathy 2/2 sedatives  Patient initially presented weak following a syncopal event at home. A CT Head on 3/23 at OSH showed no acute intracranial hemorrhage or acute abnormality of the brain, and mild diffuse cerebral atrophy and chronic white matter ischemic change. Currently sedated.  - continue sedation     CARDIOVASCULAR  # Hemodynamics  Stable.      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 Trop negative this AM.  - TTE: Borderline (EF 50-55%) reduced left ventricular function is present. Right ventricular function, chamber size, wall motion, and thickness are normal. This study was compared with the study from 3/26/2020: LVEF has improved slightly. Comparison is somewhat limited due to lack of contrast on the prior study.     # Bradycardia  Cardiology at OSH consulted. Tele without evidence of pauses or heart block. Per cards note,  bradycardia may be due to hypoxia, apnea with sleep and increased vagal tone, and his infection. TTE showing mild LV reduction 40-45%, global RV function reduction.  - telemetry     # Decreased RV function per TTE  Likely d/t ARDS.   - treat ARDS (below)    # Elevated Troponin  Initial trop <0.056. Trop on arrival 0.036. EKG on arrival showing sinus bradycardia. Troponin 3/28 <0.015  - Will check trop as part of COVID labs q48 hrs      # H/o HTN  -Hold PTA amlodipine 5 mg   -Hold PTA hydrochlorothiazide 12.5 mg daily     PULMONARY  # ARDS  # Acute Hypoxic Respiratory Failure 2/2 COVID-19   Proned and paralyzed.   Ventilation Mode: CMV/AC  (Continuous Mandatory Ventilation/ Assist Control)  FiO2 (%): 75 %  Rate Set (breaths/minute): 25 breaths/min  Tidal Volume Set (mL): 480 mL  PEEP (cm H2O): 16 cmH2O  Oxygen Concentration (%): 75 %  Resp: 25  P:F:  156 this AM  PIP: 29   Plat: <30  Plateau Pressure: <30 (24-26)  - supine again at 2 pm  - serial ABG's   - if P:F >150 then consider not proning again tonight  - artifical tears  - 66 kg= ideal BW   - Keep lungs dry; gave 40 mg IV 1x this AM  - CXR this afternoon showed increased effusion; added 2x Lasix 40 IV and albumin  - consider Zosyn for aspiration coverage if patient decompensating    GI  # Bowel Regimen  - Senna BID and Miralax BID  - 3/28 - x1 mag citrate  - 3/28 suppository   - rectal tube    # Nutrition:   - nutrition consulted   - NJ placed   - TF limited to 40/hr     # Stress Ulcer prophylaxis: Famotidine     RENAL  # Hypokalemia  - K replacement protocol  - Mg and Phos replacement protocol  - BMP     # BPH  -PTA on tamsulosin 0.4 mg daily;     # Volume - Euvolemic  S/p IVF at OSH in setting of initial dehydration. S/p Lasix 03/29/20 for net neg 1.5L     HEME/ONC  # Normocytic Anemia  Hgb 13, no signs of bleeding.   -Monitor.      # Transfusion History: none    # Elevated D-dimer  - get dopplers   - consider heparin    ENDOCRINE  BG stable. HgbA1c 5.2.  -  BG checks QID     INFECTIOUS DISEASE  # Sepsis  # COVID-19 Infection  Initially had extensive infectious work-up at OSH. C Diff neg, enteric panel neg. Flu A and B neg. Strep pneumo and Legionella neg. UA neg. CXR showing bilateral pulmonary infiltrates. COVID testing positive on 03/25/20. OSH discussed with RACHELL.   - Infectious disease consulted; Remdesivir study participant  - Contact, droplet, airborne precautions  - continue checking COVID labs: trop, CRP, ferritin q48 hrs (next 3/30)  - trend CBC with diff, CMP, CK  - no Tylenol or NSAIDS for fevers   - 03/28/20 Ferritin: 2,950 (from 1687) -> 03/30/20: 1726  - CRP 3/25 180 --> 3/26 250 --> 3/28;  03/30/20 260 -> 240  - 03/29/20 D-dimer: 2.2 -> 2.9  - 03/29/20 CK: 60   - 03/29/20 LDH: 452  - 03/29/20 IL-6: 202; talk to ID  - 03/29/20 Pro-BNP: 161  = space-out COVID labs to 48-72 hrs  Trop <0.015  Fibrinogn 513  D-Dimer >20  - Discussed with ID, will check IL-6 in AM    # Antimicrobials  Ceftriaxone 2 g IV, 5 day course, duration until 03/27  Azithromycin 250 mg every day, 5 day course, duration until 03/27     # Cultures  -OSH BC 3/23 - 1/1 staph epidermidis  - Blood culture 03/25/20: NGTD after 5 days     SKIN/MSK  PT and OT consult    General Cares/Prophylaxis:    DVT Prophylaxis: Enoxaparin (Lovenox) SQ  GI Prophylaxis: PPI  Restraints: none  Family Communication: will call wife    Lines/tubes/drains:  RIJ and R brachial artery               ETT, OG     Disposition:  - Medical ICU     The patient was seen and discussed with Dr. Reynoso, attending physician.     Krzysztof Owens Jr, MD  Internal Medicine PGY-1  [phone]    ====================================  INTERVAL HISTORY:   Overnight, increased Versed  AM COVID labs elevated d-dimer    OBJECTIVE:   1. VITAL SIGNS:   Temp:  [99.7  F (37.6  C)-100.6  F (38.1  C)] 99.9  F (37.7  C)  Heart Rate:  [69-87] 80  Resp:  [25] 25  MAP:  [72 mmHg-103 mmHg] 89 mmHg  Arterial Line BP: (101-147)/(53-79) 128/63  FiO2  (%):  [60 %-90 %] 75 %  SpO2:  [89 %-99 %] 94 %  Ventilation Mode: CMV/AC  (Continuous Mandatory Ventilation/ Assist Control)  FiO2 (%): 75 %  Rate Set (breaths/minute): 25 breaths/min  Tidal Volume Set (mL): 480 mL  PEEP (cm H2O): 16 cmH2O  Oxygen Concentration (%): 75 %  Resp: 25    2. INTAKE/ OUTPUT:   I/O last 3 completed shifts:  In: 2132.55 [I.V.:872.55; NG/GT:300]  Out: 2490 [Urine:1790; Stool:700]  Net 24 hrs: + 455 cc  Since admit: - 245 cc     Drips:  - Paralytic: None  - Analgesia: Fentanyl 200 mcg/hr  - Sedation: versed 7 mL/hr    3. PHYSICAL EXAMINATION:  General: proned man in bed  HEENT: NC, AT, MMM  Neuro: sedated, paralyzed  Pulm/Resp: synchronous with vent  CV: bradycardic-low normal rate, normal rhythm on tele  Abdomen: did not evaluate  : did not evaluate  Incisions/Skin: no visible rashes    4. LABS:   Arterial Blood Gases   Recent Labs   Lab 04/01/20  1520 04/01/20  1330 04/01/20  0409 03/31/20 1958   PH 7.50* 7.46* 7.47* 7.47*   PCO2 44 50* 47* 47*   PO2 93 93 133* 115*   HCO3 34* 35* 34* 34*     Complete Blood Count   Recent Labs   Lab 04/01/20  0409 03/31/20  0402 03/30/20  0313 03/29/20  0316   WBC 6.0 5.7 5.3 5.4   HGB 11.9* 12.5* 12.7* 13.0*    186 188 171     Basic Metabolic Panel  Recent Labs   Lab 04/01/20  0409 03/31/20  0402 03/30/20  2341 03/30/20  1637 03/30/20  0313    142  --  141 139   POTASSIUM 4.0 3.5  --  3.4 3.5   CHLORIDE 107 106  --  104 105   CO2 31 32  --  33* 32   BUN 29 28  --  28 26   CR 0.84 0.73 0.73 0.71 0.77   * 146*  --  124* 131*     Liver Function Tests  Recent Labs   Lab 04/01/20 0409 03/31/20  0402 03/30/20 0313 03/29/20 0316 03/28/20  0348   AST  --  56* 77* 120* 77*   ALT  --  45 54 64 37   ALKPHOS  --  57 53 51 44   BILITOTAL  --  0.4 0.5 0.4 0.4   ALBUMIN  --  1.6* 1.7* 1.7* 1.6*   INR 1.30* 1.29*  --   --   --      Pancreatic Enzymes  No lab results found in last 7 days.  Coagulation Profile  Recent Labs   Lab 04/01/20  0401  03/31/20  0402   INR 1.30* 1.29*   PTT 37  --      5. RADIOLOGY:   Recent Results (from the past 24 hour(s))   XR Chest Port 1 View    Narrative    Portable chest    INDICATION: Assess ET tube and lung infiltrates after supination    COMPARISON: 3/30/2020    FINDINGS: Endotracheal tube noted with tip approximately 2 cm above  the abe. Retrocardiac atelectasis appears increased, recommend  follow-up to clearing to exclude infection. Left pleural effusion is  similar or slightly increased. Right costophrenic angle is clear but  there is no obvious large pleural effusion. Extensive interstitial and  airspace opacities are again noted in the right lower lung, concerning  for edema and/or infection. Right IJ catheter is again somewhat curled  upon itself, the tip projects in the mid SVC.      Impression    IMPRESSION: ET tube approximately 2 cm above the abe. Increased  retrocardiac opacification which may indicate increased atelectasis.  Recommend follow-up to clearing to exclude infection. Left pleural  effusion also appearing slightly increased. Right lung chest  interstitial and airspace opacities, suggesting edema and/or  infection.    VIRGEN GORMAN MD     03/25/20 TTE  Mildly (EF 40-45%) reduced left ventricular function is present.  Global right ventricular function is mildly reduced.  Unable to assess mean RA pressure given the patient is on a ventilator.  No pericardial effusion is present.  There is no prior study for direct comparison.

## 2020-04-01 NOTE — PLAN OF CARE
PT-4C- Per chart review, pt remain intubated, sedated and in prone position with labile O2 requirements from %. Not appropriate for PT at this time.

## 2020-04-01 NOTE — PLAN OF CARE
OT: Per chart review, pt remain intubated, sedated and in prone position with labile O2 requirements from %. Not appropriate for OT at this time.

## 2020-04-02 ENCOUNTER — APPOINTMENT (OUTPATIENT)
Dept: GENERAL RADIOLOGY | Facility: CLINIC | Age: 75
DRG: 870 | End: 2020-04-02
Attending: INTERNAL MEDICINE
Payer: MEDICARE

## 2020-04-02 LAB
ANION GAP SERPL CALCULATED.3IONS-SCNC: 4 MMOL/L (ref 3–14)
BASE EXCESS BLDA CALC-SCNC: 10.3 MMOL/L
BASE EXCESS BLDA CALC-SCNC: 11.4 MMOL/L
BASE EXCESS BLDA CALC-SCNC: 9.5 MMOL/L
BUN SERPL-MCNC: 31 MG/DL (ref 7–30)
CALCIUM SERPL-MCNC: 8.1 MG/DL (ref 8.5–10.1)
CHLORIDE SERPL-SCNC: 106 MMOL/L (ref 94–109)
CO2 SERPL-SCNC: 33 MMOL/L (ref 20–32)
CREAT SERPL-MCNC: 0.99 MG/DL (ref 0.66–1.25)
ERYTHROCYTE [DISTWIDTH] IN BLOOD BY AUTOMATED COUNT: 13.7 % (ref 10–15)
GFR SERPL CREATININE-BSD FRML MDRD: 74 ML/MIN/{1.73_M2}
GLUCOSE SERPL-MCNC: 141 MG/DL (ref 70–99)
HCO3 BLD-SCNC: 34 MMOL/L (ref 21–28)
HCO3 BLD-SCNC: 34 MMOL/L (ref 21–28)
HCO3 BLD-SCNC: 37 MMOL/L (ref 21–28)
HCT VFR BLD AUTO: 33.5 % (ref 40–53)
HGB BLD-MCNC: 10.6 G/DL (ref 13.3–17.7)
IL6 SERPL-MCNC: 152.56 PG/ML
MCH RBC QN AUTO: 30.6 PG (ref 26.5–33)
MCHC RBC AUTO-ENTMCNC: 31.6 G/DL (ref 31.5–36.5)
MCV RBC AUTO: 97 FL (ref 78–100)
O2/TOTAL GAS SETTING VFR VENT: 55 %
O2/TOTAL GAS SETTING VFR VENT: 60 %
O2/TOTAL GAS SETTING VFR VENT: 65 %
PCO2 BLD: 41 MM HG (ref 35–45)
PCO2 BLD: 47 MM HG (ref 35–45)
PCO2 BLD: 47 MM HG (ref 35–45)
PH BLD: 7.47 PH (ref 7.35–7.45)
PH BLD: 7.5 PH (ref 7.35–7.45)
PH BLD: 7.53 PH (ref 7.35–7.45)
PLATELET # BLD AUTO: 167 10E9/L (ref 150–450)
PO2 BLD: 103 MM HG (ref 80–105)
PO2 BLD: 117 MM HG (ref 80–105)
PO2 BLD: 61 MM HG (ref 80–105)
POTASSIUM SERPL-SCNC: 3.6 MMOL/L (ref 3.4–5.3)
PROCALCITONIN SERPL-MCNC: 0.15 NG/ML
RBC # BLD AUTO: 3.46 10E12/L (ref 4.4–5.9)
SODIUM SERPL-SCNC: 143 MMOL/L (ref 133–144)
WBC # BLD AUTO: 7.7 10E9/L (ref 4–11)

## 2020-04-02 PROCEDURE — P9047 ALBUMIN (HUMAN), 25%, 50ML: HCPCS | Performed by: STUDENT IN AN ORGANIZED HEALTH CARE EDUCATION/TRAINING PROGRAM

## 2020-04-02 PROCEDURE — 25000132 ZZH RX MED GY IP 250 OP 250 PS 637: Mod: GY | Performed by: NURSE PRACTITIONER

## 2020-04-02 PROCEDURE — 94640 AIRWAY INHALATION TREATMENT: CPT

## 2020-04-02 PROCEDURE — 84145 PROCALCITONIN (PCT): CPT | Performed by: STUDENT IN AN ORGANIZED HEALTH CARE EDUCATION/TRAINING PROGRAM

## 2020-04-02 PROCEDURE — 40000275 ZZH STATISTIC RCP TIME EA 10 MIN

## 2020-04-02 PROCEDURE — 25000125 ZZHC RX 250: Performed by: STUDENT IN AN ORGANIZED HEALTH CARE EDUCATION/TRAINING PROGRAM

## 2020-04-02 PROCEDURE — 25000128 H RX IP 250 OP 636: Performed by: STUDENT IN AN ORGANIZED HEALTH CARE EDUCATION/TRAINING PROGRAM

## 2020-04-02 PROCEDURE — 82803 BLOOD GASES ANY COMBINATION: CPT | Performed by: STUDENT IN AN ORGANIZED HEALTH CARE EDUCATION/TRAINING PROGRAM

## 2020-04-02 PROCEDURE — 25000132 ZZH RX MED GY IP 250 OP 250 PS 637: Mod: GY | Performed by: STUDENT IN AN ORGANIZED HEALTH CARE EDUCATION/TRAINING PROGRAM

## 2020-04-02 PROCEDURE — 94003 VENT MGMT INPAT SUBQ DAY: CPT

## 2020-04-02 PROCEDURE — 99291 CRITICAL CARE FIRST HOUR: CPT | Mod: GC | Performed by: INTERNAL MEDICINE

## 2020-04-02 PROCEDURE — 20000004 ZZH R&B ICU UMMC

## 2020-04-02 PROCEDURE — 25800030 ZZH RX IP 258 OP 636: Performed by: INTERNAL MEDICINE

## 2020-04-02 PROCEDURE — 71045 X-RAY EXAM CHEST 1 VIEW: CPT

## 2020-04-02 PROCEDURE — 40000014 ZZH STATISTIC ARTERIAL MONITORING DAILY

## 2020-04-02 PROCEDURE — 80048 BASIC METABOLIC PNL TOTAL CA: CPT | Performed by: STUDENT IN AN ORGANIZED HEALTH CARE EDUCATION/TRAINING PROGRAM

## 2020-04-02 PROCEDURE — 25000128 H RX IP 250 OP 636: Performed by: NURSE PRACTITIONER

## 2020-04-02 PROCEDURE — 83520 IMMUNOASSAY QUANT NOS NONAB: CPT | Performed by: STUDENT IN AN ORGANIZED HEALTH CARE EDUCATION/TRAINING PROGRAM

## 2020-04-02 PROCEDURE — 25000132 ZZH RX MED GY IP 250 OP 250 PS 637: Mod: GY | Performed by: INTERNAL MEDICINE

## 2020-04-02 PROCEDURE — 27210429 ZZH NUTRITION PRODUCT INTERMEDIATE LITER

## 2020-04-02 PROCEDURE — 25000128 H RX IP 250 OP 636: Performed by: INTERNAL MEDICINE

## 2020-04-02 PROCEDURE — 85027 COMPLETE CBC AUTOMATED: CPT | Performed by: STUDENT IN AN ORGANIZED HEALTH CARE EDUCATION/TRAINING PROGRAM

## 2020-04-02 PROCEDURE — 25800030 ZZH RX IP 258 OP 636: Performed by: STUDENT IN AN ORGANIZED HEALTH CARE EDUCATION/TRAINING PROGRAM

## 2020-04-02 RX ORDER — ALBUTEROL SULFATE 0.83 MG/ML
2.5 SOLUTION RESPIRATORY (INHALATION) DAILY
Status: COMPLETED | OUTPATIENT
Start: 2020-04-02 | End: 2020-04-04

## 2020-04-02 RX ORDER — DEXMEDETOMIDINE HYDROCHLORIDE 4 UG/ML
0.2-0.7 INJECTION, SOLUTION INTRAVENOUS CONTINUOUS
Status: DISCONTINUED | OUTPATIENT
Start: 2020-04-02 | End: 2020-04-04 | Stop reason: HOSPADM

## 2020-04-02 RX ORDER — MICONAZOLE NITRATE 20 MG/G
CREAM TOPICAL 2 TIMES DAILY
Status: DISCONTINUED | OUTPATIENT
Start: 2020-04-02 | End: 2020-04-04 | Stop reason: HOSPADM

## 2020-04-02 RX ORDER — PIPERACILLIN SODIUM, TAZOBACTAM SODIUM 4; .5 G/20ML; G/20ML
4.5 INJECTION, POWDER, LYOPHILIZED, FOR SOLUTION INTRAVENOUS EVERY 6 HOURS
Status: DISCONTINUED | OUTPATIENT
Start: 2020-04-02 | End: 2020-04-04 | Stop reason: HOSPADM

## 2020-04-02 RX ORDER — AMINO AC/PROTEIN HYDR/WHEY PRO 10G-100/30
1 LIQUID (ML) ORAL 4 TIMES DAILY
Status: DISCONTINUED | OUTPATIENT
Start: 2020-04-02 | End: 2020-04-04 | Stop reason: HOSPADM

## 2020-04-02 RX ORDER — FUROSEMIDE 10 MG/ML
40 INJECTION INTRAMUSCULAR; INTRAVENOUS ONCE
Status: COMPLETED | OUTPATIENT
Start: 2020-04-02 | End: 2020-04-02

## 2020-04-02 RX ORDER — FENTANYL CITRATE 50 UG/ML
50-100 INJECTION, SOLUTION INTRAMUSCULAR; INTRAVENOUS
Status: DISCONTINUED | OUTPATIENT
Start: 2020-04-02 | End: 2020-04-04 | Stop reason: HOSPADM

## 2020-04-02 RX ADMIN — Medication 1 PACKET: at 19:41

## 2020-04-02 RX ADMIN — AZITHROMYCIN MONOHYDRATE 500 MG: 500 INJECTION, POWDER, LYOPHILIZED, FOR SOLUTION INTRAVENOUS at 09:56

## 2020-04-02 RX ADMIN — ALBUMIN HUMAN 25 G: 0.25 SOLUTION INTRAVENOUS at 00:14

## 2020-04-02 RX ADMIN — Medication 250 MCG/HR: at 18:30

## 2020-04-02 RX ADMIN — FUROSEMIDE 40 MG: 10 INJECTION, SOLUTION INTRAMUSCULAR; INTRAVENOUS at 17:14

## 2020-04-02 RX ADMIN — DORNASE ALFA 2.5 MG: 1 SOLUTION RESPIRATORY (INHALATION) at 12:36

## 2020-04-02 RX ADMIN — Medication: at 21:51

## 2020-04-02 RX ADMIN — Medication 1 PACKET: at 11:40

## 2020-04-02 RX ADMIN — PIPERACILLIN SODIUM AND TAZOBACTAM SODIUM 4.5 G: 4; .5 INJECTION, POWDER, LYOPHILIZED, FOR SOLUTION INTRAVENOUS at 09:30

## 2020-04-02 RX ADMIN — Medication: at 05:24

## 2020-04-02 RX ADMIN — FUROSEMIDE 40 MG: 10 INJECTION, SOLUTION INTRAMUSCULAR; INTRAVENOUS at 00:15

## 2020-04-02 RX ADMIN — FAMOTIDINE 20 MG: 20 TABLET ORAL at 19:41

## 2020-04-02 RX ADMIN — MICONAZOLE NITRATE: 20 CREAM TOPICAL at 23:55

## 2020-04-02 RX ADMIN — Medication: at 08:03

## 2020-04-02 RX ADMIN — FAMOTIDINE 20 MG: 20 TABLET ORAL at 08:03

## 2020-04-02 RX ADMIN — DEXMEDETOMIDINE 0.2 MCG/KG/HR: 100 INJECTION, SOLUTION, CONCENTRATE INTRAVENOUS at 23:55

## 2020-04-02 RX ADMIN — MIDAZOLAM 2 MG: 1 INJECTION INTRAMUSCULAR; INTRAVENOUS at 05:00

## 2020-04-02 RX ADMIN — FENTANYL CITRATE 50 MCG: 50 INJECTION, SOLUTION INTRAMUSCULAR; INTRAVENOUS at 04:30

## 2020-04-02 RX ADMIN — FENTANYL CITRATE 50 MCG: 50 INJECTION, SOLUTION INTRAMUSCULAR; INTRAVENOUS at 05:26

## 2020-04-02 RX ADMIN — Medication 1 PACKET: at 15:55

## 2020-04-02 RX ADMIN — DOXAZOSIN 1 MG: 1 TABLET ORAL at 08:03

## 2020-04-02 RX ADMIN — ENOXAPARIN SODIUM 40 MG: 40 INJECTION SUBCUTANEOUS at 17:13

## 2020-04-02 RX ADMIN — POTASSIUM CHLORIDE 20 MEQ: 1.5 POWDER, FOR SOLUTION ORAL at 06:36

## 2020-04-02 RX ADMIN — PIPERACILLIN SODIUM AND TAZOBACTAM SODIUM 4.5 G: 4; .5 INJECTION, POWDER, LYOPHILIZED, FOR SOLUTION INTRAVENOUS at 21:51

## 2020-04-02 RX ADMIN — Medication: at 15:55

## 2020-04-02 RX ADMIN — FENTANYL CITRATE 50 MCG: 50 INJECTION, SOLUTION INTRAMUSCULAR; INTRAVENOUS at 08:12

## 2020-04-02 RX ADMIN — MIDAZOLAM (PF) 1 MG/ML IN 0.9 % SODIUM CHLORIDE INTRAVENOUS SOLUTION 6 MG/HR: at 13:37

## 2020-04-02 RX ADMIN — Medication 250 MCG/HR: at 09:33

## 2020-04-02 RX ADMIN — FENTANYL CITRATE 50 MCG: 50 INJECTION, SOLUTION INTRAMUSCULAR; INTRAVENOUS at 00:48

## 2020-04-02 RX ADMIN — MULTIVITAMIN 15 ML: LIQUID ORAL at 09:35

## 2020-04-02 RX ADMIN — PIPERACILLIN SODIUM AND TAZOBACTAM SODIUM 4.5 G: 4; .5 INJECTION, POWDER, LYOPHILIZED, FOR SOLUTION INTRAVENOUS at 15:53

## 2020-04-02 RX ADMIN — MIDAZOLAM 2 MG: 1 INJECTION INTRAMUSCULAR; INTRAVENOUS at 00:20

## 2020-04-02 RX ADMIN — FENTANYL CITRATE 50 MCG: 50 INJECTION, SOLUTION INTRAMUSCULAR; INTRAVENOUS at 00:00

## 2020-04-02 RX ADMIN — Medication 1000 UNITS: at 08:03

## 2020-04-02 RX ADMIN — DEXMEDETOMIDINE 0.2 MCG/KG/HR: 100 INJECTION, SOLUTION, CONCENTRATE INTRAVENOUS at 10:12

## 2020-04-02 RX ADMIN — ALBUTEROL SULFATE 2.5 MG: 2.5 SOLUTION RESPIRATORY (INHALATION) at 12:36

## 2020-04-02 ASSESSMENT — ACTIVITIES OF DAILY LIVING (ADL)
ADLS_ACUITY_SCORE: 15

## 2020-04-02 ASSESSMENT — MIFFLIN-ST. JEOR: SCORE: 1754.75

## 2020-04-02 NOTE — PROGRESS NOTES
CLINICAL NUTRITION SERVICES - REASSESSMENT NOTE     Nutrition Prescription    RECOMMENDATIONS FOR MDs/PROVIDERS TO ORDER:  Monitor fluid status with decrease in overall water intake with more concentrated TF formula and increase in protein provision.    Malnutrition Status:    Non-severe malnutrition with critical illness.    Recommendations already ordered by Registered Dietitian (RD):  Spoke with RN re: TF rate, pt staying supine.   Changed TF regimen to TwoCal HN @ 40 mL/hr (960 mL/day) to provide 1920 kcals (24 kcal/kg/day), 81 g PRO (1 g/kg/day), 672 mL H2O, 210 g CHO and 5 g Fiber daily to better meet kcal/PRO needs while maintaining a low volume per MD preference.  Wrote orders for 4 pkt Prosource to meet kcal/PRO needs, bringing total provisions to 2080 kcal (26 kcal/kg) and 125 g PRO (1.6 g PRO/kg) daily.    Future/Additional Recommendations:  Change back to Nutren 1.5 PRN if ok with MD to allow higher goal rate.  Add fiber if stool output drops off.       EVALUATION OF THE PROGRESS TOWARD GOALS   Diet: NPO  Nutrition Support ORDERS: Nutren 1.5 @ 60 mL/hr via NDT to provide 2160 kcals (27 kcal/kg/day), 98 g PRO (98 g/kg/day), 1094 mL H2O, 253 g CHO and no fiber daily. There is a pt care order from 3/30 for RN to hold TF rate at 40 mL/hr.  Intake: pt received an avg of 10 kcal/kg and 0.5 g PRO/kg over the past 7 days d/t TF being held under goal rate.        NEW FINDINGS   Weight is down 3.4 kg (a decrease of 3%) over the past week.   Pt is supine.  Eyes covered with washcloth d/t edema from prolonged proning    Will aim for 1.5 g PRO/kg/day for PRO needs given ongoing critical illness with COVID-19 infection.    MALNUTRITION  % Intake: </= 50% for >/= 5 days   % Weight Loss: > 2% in 1 week   Subcutaneous Fat Loss: Unable to assess on visual inspection  Muscle Loss: Unable to assess on visual inspection  Fluid Accumulation/Edema: None noted on visual inspection  Malnutrition Diagnosis: Non-severe  malnutrition with critical illness.     Previous Goal from 3/26/20   Adv to goal nutrition support within 2-3 days.  Evaluation: Not met    Previous Nutrition Diagnosis  Inadequate oral intake  Evaluation: Not applicable, nutrition diagnosis changed below    CURRENT NUTRITION DIAGNOSIS  Inadequate protein-energy intake related to inadequate intake from enteral nutrition infusion as evidenced by pt received an avg of 10 kcal/kg and 0.5 g PRO/kg daily from TF intake over the past 7 days; weight loss of 3% over the past 7 days; and currently TF are infusing < goal rate, 40 mL/hr.       INTERVENTIONS  Implementation  Collaboration with other providers- spoke with RN re: low TF rate.  Enteral Nutrition- changed to a more concentrated TF regimen to help meet kcal/PRO needs.   Added protein modular to help meet PRO needs.     Goals  Total avg nutritional intake to meet a minimum of 25 kcal/kg and 1.5 g PRO/kg daily (per dosing wt 80 kg).    Monitoring/Evaluation  Progress toward goals will be monitored and evaluated per protocol.    Hazel Neal, STEFANIE, LD  ([facility] dietitian, oev- [phone])

## 2020-04-02 NOTE — PROGRESS NOTES
MEDICAL ICU PROGRESS NOTE  04/02/2020      Date of Service (when I saw the patient): 04/02/2020    ASSESSMENT: Eduardo Kemp is a 73 YO M with a h/o HTN who was admitted for AHRF and developed ARDS 2/2 COVID-19 (positive on 03/23/20 at an OSH) and transferred to [facility] on 03/25/20 for participation in the Remdesivir clinical trial. He remains intubated and sedated.      CHANGES and MAJOR THINGS TODAY:   - Supinated  - Started Precedex  - Start Zosyn and Azithromycin  - Add Pulmozyme and Albuterol  - CXR: Decrease in left pleural effusion; increase in b/l interstitial opacities   - will f/up afternoon ABG  - will consider further diuresis at 6pm    PLAN:  NEURO/PSYCH  # Sedation  - Versed gtt, Versed prn   - Precedex gtt     # Pain  - Fentanyl gtt, and fentanyl prn      # Paralytics  Off of paralytics.  - can start Vecuronium if needed    # Acute encephalopathy 2/2 sedatives  Patient initially presented weak following a syncopal event at home. A CT Head on 3/23 at OSH showed no acute intracranial hemorrhage or acute abnormality of the brain, and mild diffuse cerebral atrophy and chronic white matter ischemic change. Currently sedated.  - continue sedation     CARDIOVASCULAR  # Hemodynamics  Stable.      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 Trop negative this AM.  - TTE: Borderline (EF 50-55%) reduced left ventricular function is present. Right ventricular function, chamber size, wall motion, and thickness are normal. This study was compared with the study from 3/26/2020: LVEF has improved slightly. Comparison is somewhat limited due to lack of contrast on the prior study.     # Bradycardia  Cardiology at OSH consulted. Tele without evidence of pauses or heart block. Per cards note, bradycardia may be due to hypoxia, apnea with sleep and increased vagal tone, and his infection. TTE showing mild LV reduction 40-45%,  global RV function reduction.  - telemetry     # Decreased RV function per TTE  Likely d/t ARDS.   - treat ARDS (below)    # Elevated Troponin  Initial trop <0.056. Trop on arrival 0.036. EKG on arrival showing sinus bradycardia. Troponin 3/28 <0.015  - Will check trop as part of COVID labs q48 hrs      # H/o HTN  -Hold PTA amlodipine 5 mg   -Hold PTA hydrochlorothiazide 12.5 mg daily     PULMONARY  # ARDS  # Acute Hypoxic Respiratory Failure 2/2 COVID-19   Ventilation Mode: CMV/AC  (Continuous Mandatory Ventilation/ Assist Control)  FiO2 (%): 65 %  Rate Set (breaths/minute): 25 breaths/min  Tidal Volume Set (mL): 450 mL  PEEP (cm H2O): 16 cmH2O  Oxygen Concentration (%): 65 %  Resp: 25  - serial ABG's   - artifical tears  - 66 kg= ideal BW   - Keep lungs dry  - s/p Lasix 40 IV and albumin (x2)  - Start Zosyn and Azithromycin for aspiration coverage (04/02/20 - current)  - Start Albuterol and Pulmozyme    GI  # Bowel Regimen  - Senna BID and Miralax BID  - 3/28 - x1 mag citrate  - 3/28 suppository   - rectal tube    # Nutrition:   - nutrition consulted   - NJ placed   - TF limited to 40/hr     # Stress Ulcer prophylaxis: Famotidine     RENAL  # Hypokalemia  - K replacement protocol  - Mg and Phos replacement protocol  - BMP     # Contraction Alkalosis  2/2 diuresis.    # BPH  -PTA on tamsulosin 0.4 mg daily;     # Volume - Euvolemic  S/p IVF at OSH in setting of initial dehydration. S/p Lasix 03/29/20 for net neg 1.5L     HEME/ONC  # Normocytic Anemia  Hgb 13, no signs of bleeding.   -Monitor.      # Transfusion History: none    # Elevated D-dimer  04/01/20: US b/l upper and lower: no DVT     ENDOCRINE  BG stable. HgbA1c 5.2.  - BG checks QID    HypoVit D  - cholecalciferol 1000 qd     INFECTIOUS DISEASE  # Sepsis  # COVID-19 Infection  Initially had extensive infectious work-up at OSH. C Diff neg, enteric panel neg. Flu A and B neg. Strep pneumo and Legionella neg. UA neg. CXR showing bilateral pulmonary  infiltrates. COVID testing positive on 03/25/20. OSH discussed with RACHELL.   - Infectious disease consulted; Remdesivir study participant  - Contact, droplet, airborne precautions  - continue checking COVID labs: trop, CRP, ferritin q48 hrs (next 3/30)  - trend CBC with diff, CMP, CK  - no Tylenol or NSAIDS for fevers   - 03/28/20 Ferritin: 2,950 (from 1687) -> 03/30/20: 1726  - CRP 3/25 180 --> 3/26 250 --> 3/28;  03/30/20 260 -> 240  - 03/29/20 D-dimer: 2.2 -> 2.9 -> >20  - 03/29/20 CK: 60   - 03/29/20 LDH: 452  - 03/29/20 IL-6: 202; talk to ID  - 03/29/20 Pro-BNP: 161  Fibrinogen: 513    # Antimicrobials  Ceftriaxone 2 g IV, 5 day course, duration until 03/27  Azithromycin 250 mg every day, 5 day course, duration until 03/27  Zosyn 3.375g q8, (04/02/20 - current)  Azithromycin (04/02/20 - current)     # Cultures  -OSH BC 3/23 - 1/1 staph epidermidis  - Blood culture 03/25/20: NGTD after 5 days     SKIN/MSK  PT and OT consult    General Cares/Prophylaxis:    DVT Prophylaxis: Enoxaparin (Lovenox) SQ  GI Prophylaxis: PPI  Restraints: none  Family Communication: will call wife    Lines/tubes/drains:  RIJ and R brachial artery               ETT, OG     Disposition:  - Medical ICU     The patient was seen and discussed with Dr. Reynoso, attending physician.     Krzysztof Owens Jr, MD  Internal Medicine PGY-1  [phone]    ====================================  INTERVAL HISTORY:   Tmax overnight 101.3, supinated.    OBJECTIVE:   1. VITAL SIGNS:   Temp:  [99.7  F (37.6  C)-101.3  F (38.5  C)] 100  F (37.8  C)  Heart Rate:  [62-87] 63  Resp:  [25-29] 25  MAP:  [66 mmHg-91 mmHg] 75 mmHg  Arterial Line BP: ()/(48-70) 114/52  FiO2 (%):  [60 %-80 %] 65 %  SpO2:  [92 %-99 %] 97 %  Ventilation Mode: CMV/AC  (Continuous Mandatory Ventilation/ Assist Control)  FiO2 (%): 65 %  Rate Set (breaths/minute): 25 breaths/min  Tidal Volume Set (mL): 450 mL  PEEP (cm H2O): 16 cmH2O  Oxygen Concentration (%): 65 %  Resp: 25    2. INTAKE/  OUTPUT:   I/O last 3 completed shifts:  In: 2407.62 [I.V.:847.62; NG/GT:400]  Out: 3510 [Urine:3160; Stool:350]  Net 24 hrs: - 160 cc  Since admit: - 245 cc     Drips:  - Paralytic: None  - Analgesia: Fentanyl 200 mcg/hr  - Sedation: versed 7 mL/hr    3. PHYSICAL EXAMINATION:  General: proned man in bed  HEENT: NC, AT, MMM  Neuro: sedated, paralyzed  Pulm/Resp: synchronous with vent  CV: bradycardic-low normal rate, normal rhythm on tele  Abdomen: did not evaluate  : did not evaluate  Incisions/Skin: no visible rashes    4. LABS:   Arterial Blood Gases   Recent Labs   Lab 04/02/20 0303 04/01/20  2320 04/01/20  1820 04/01/20  1520   PH 7.50* 7.57* 7.47* 7.50*   PCO2 47* 38 49* 44   PO2 61* 89 63* 93   HCO3 37* 35* 36* 34*     Complete Blood Count   Recent Labs   Lab 04/02/20 0303 04/01/20 0409 03/31/20  0402 03/30/20  0313   WBC 7.7 6.0 5.7 5.3   HGB 10.6* 11.9* 12.5* 12.7*    184 186 188     Basic Metabolic Panel  Recent Labs   Lab 04/02/20 0303 04/01/20 0409 03/31/20  0402 03/30/20  2341 03/30/20  1637    142 142  --  141   POTASSIUM 3.6 4.0 3.5  --  3.4   CHLORIDE 106 107 106  --  104   CO2 33* 31 32  --  33*   BUN 31* 29 28  --  28   CR 0.99 0.84 0.73 0.73 0.71   * 136* 146*  --  124*     Liver Function Tests  Recent Labs   Lab 04/01/20 0409 03/31/20  0402 03/30/20  0313 03/29/20  0316 03/28/20  0348   AST  --  56* 77* 120* 77*   ALT  --  45 54 64 37   ALKPHOS  --  57 53 51 44   BILITOTAL  --  0.4 0.5 0.4 0.4   ALBUMIN  --  1.6* 1.7* 1.7* 1.6*   INR 1.30* 1.29*  --   --   --      Pancreatic Enzymes  No lab results found in last 7 days.  Coagulation Profile  Recent Labs   Lab 04/01/20  0409 03/31/20  0402   INR 1.30* 1.29*   PTT 37  --      5. RADIOLOGY:   Recent Results (from the past 24 hour(s))   US Upper Extremity Venous Duplex Bilat    Narrative    EXAMINATION: DOPPLER VENOUS ULTRASOUND OF BILATERAL UPPER EXTREMITIES,  4/1/2020 4:25 PM     COMPARISON: None.    HISTORY: Swelling,  assess for possible DVT    TECHNIQUE:  Gray-scale evaluation with compression, spectral flow, and  color Doppler assessment of the deep venous system of both upper  extremities.    FINDINGS:  The right internal jugular vein and innominate vein are not visualized  due to the presence of a catheter. Normal blood flow and waveforms are  demonstrated in the left internal jugular and innominate veins. The  bilateral subclavian, and axillary veins bilaterally also demonstrate  normal blood flow and waveforms.. There is normal compressibility of  the brachial, basilic and cephalic veins bilaterally.      Impression    IMPRESSION:  No evidence of deep venous thrombosis in either upper  extremity. The right jugular and innominate veins are obscured by a  catheter.    I have personally reviewed the examination and initial interpretation  and I agree with the findings.    MARCELLA MOHAN MD   US Lower Extremity Venous Duplex Bilateral    Narrative    EXAMINATION: DOPPLER VENOUS ULTRASOUND OF BILATERAL LOWER EXTREMITIES,  4/1/2020 4:25 PM     COMPARISON: None.    HISTORY: Assess for possible DVT.  Swelling.    TECHNIQUE:  Gray-scale evaluation with compression, spectral flow and  color Doppler assessment of the deep venous system of both legs from  groin to knee, and then at the ankles.    FINDINGS:  In both lower extremities, the common femoral, femoral, popliteal and  posterior tibial veins demonstrate normal compressibility and blood  flow.      Impression    IMPRESSION:  No evidence of deep venous thrombosis in either lower  extremity.       I have personally reviewed the examination and initial interpretation  and I agree with the findings.    MARCELLA MOHAN MD   XR Chest Port 1 View    Narrative    Portable chest    INDICATION: Assess ET tube and lung infiltrates after supination    COMPARISON: 3/30/2020    FINDINGS: Endotracheal tube noted with tip approximately 2 cm above  the abe. Retrocardiac atelectasis appears  increased, recommend  follow-up to clearing to exclude infection. Left pleural effusion is  similar or slightly increased. Right costophrenic angle is clear but  there is no obvious large pleural effusion. Extensive interstitial and  airspace opacities are again noted in the right lower lung, concerning  for edema and/or infection. Right IJ catheter is again somewhat curled  upon itself, the tip projects in the mid SVC.      Impression    IMPRESSION: ET tube approximately 2 cm above the abe. Increased  retrocardiac opacification which may indicate increased atelectasis.  Recommend follow-up to clearing to exclude infection. Left pleural  effusion also appearing slightly increased. Right lung chest  interstitial and airspace opacities, suggesting edema and/or  infection.    VIRGEN GORMAN MD   XR Chest Port 1 View    Narrative    EXAM: XR CHEST PORT 1 VW  4/2/2020 8:11 AM      HISTORY: febrile, continued poor respiratory status, concern for  aspiration    COMPARISON: X-ray: 4/1/2020    FINDINGS: Single view of the chest. Tip of the ETT projects roughly  4.6 cm above the abe. Enteric tube tip projects below the diaphragm  and out of field of view. Right IJ central venous catheter tip  projects over the cavoatrial junction, and seems to be somewhat curled  upon itself. Blunting of the left costophrenic angle, decreased from  prior. The right costophrenic angle is out of the field of view.  Stable heart size. Retrocardiac opacity. Bilateral interstitial  opacities with basilar predominance, slightly increased from prior on  the right and stable on the left.      Impression    IMPRESSION:  1. Decrease in left pleural effusion. Right costophrenic angle is out  of the field-of-view.  2. Bilateral interstitial opacities with basilar predominance,  increased from prior on the right and stable on the left, likely  represent pulmonary edema versus infection.  3. Unchanged retrocardiac opacity, atelectasis versus  infection.    I have personally reviewed the examination and initial interpretation  and I agree with the findings.    KIYA BAPTISTE MD     03/25/20 TTE  Mildly (EF 40-45%) reduced left ventricular function is present.  Global right ventricular function is mildly reduced.  Unable to assess mean RA pressure given the patient is on a ventilator.  No pericardial effusion is present.  There is no prior study for direct comparison.

## 2020-04-02 NOTE — PLAN OF CARE
ICU End of Shift Summary. See flowsheets for vital signs and detailed assessment.    Changes this shift: FiO2 between 60-70% overnight to maintain sats >90, currently at 65%. Tidal volume decreased to 450 due to high pH. Overbreathes the vent into the 30s and requires slightly higher FiO2 with repositioning, versed/fentanyl bumps help with tachypnea. Blood pressure a little soft at times (90s-100s/50s), but MAPs have been high 60s-low 70s. Lasix/albumin given, net negative ~700ml for 4/1. Is currently negative ~1400 for this admission. Potassium replaced per protocol. Tmax 101.3.    Plan: Continue to wean FiO2 as tolerated. Continue POC. Notify team of changes.

## 2020-04-02 NOTE — PLAN OF CARE
Major Shift Events:  T-max 100.4F. NSR HR 60s. MAP >65. TF switched to TwoCal HN goal @40ml/hr. UOP 50ml/hr. 1x 40mg IV lasix. Vent changes RR down to 19, tidal volume increased to 500. PEEP 16, FiO2 50%. Started Pulmozyme. Small amounts of thick/tan secretions. Increased fentanyl to 250 and added precedex @0.2, want to slowly wean versed but not until tomorrow.     Plan: monitor ABGs, continue abx, assess weaning sedation tomorrow.     For vital signs and complete assessments, please see documentation flowsheets.       Problem: Adult Inpatient Plan of Care  Goal: Plan of Care Review  4/2/2020 1704 by Marielos Camarillo RN  Outcome: No Change     Problem: Adult Inpatient Plan of Care  Goal: Readiness for Transition of Care  4/2/2020 1704 by Marielos Camarillo, RN  Outcome: No Change     Problem: ARDS (Acute Respiratory Distress Syndrome)  Goal: Effective Oxygenation  4/2/2020 1704 by Marielos Camarillo, RN  Outcome: No Change     Problem: Cardiac Disease Comorbidity  Goal: Cardiac Disease  Description: Patient comorbidity will be monitored for signs and symptoms of Cardiac Disease.  Problems will be absent, minimized or managed by discharge/transition of care.  4/2/2020 1704 by Marielos Camarillo, RN  Outcome: No Change

## 2020-04-02 NOTE — PROGRESS NOTES
Last Arterial Blood Gas:  pH Arterial   Date Value Ref Range Status   04/02/2020 7.53 (H) 7.35 - 7.45 pH Final     pCO2 Arterial   Date Value Ref Range Status   04/02/2020 41 35 - 45 mm Hg Final     pO2 Arterial   Date Value Ref Range Status   04/02/2020 117 (H) 80 - 105 mm Hg Final     Bicarbonate Arterial   Date Value Ref Range Status   04/02/2020 34 (H) 21 - 28 mmol/L Final     Base Excess Art   Date Value Ref Range Status   04/02/2020 10.3 mmol/L Final     Comment:     Abnormal Result, Ref range: -9.0 to 1.8

## 2020-04-03 LAB
ANION GAP SERPL CALCULATED.3IONS-SCNC: 3 MMOL/L (ref 3–14)
BASE EXCESS BLDA CALC-SCNC: 9.1 MMOL/L
BASE EXCESS BLDA CALC-SCNC: 9.2 MMOL/L
BUN SERPL-MCNC: 30 MG/DL (ref 7–30)
CALCIUM SERPL-MCNC: 8.1 MG/DL (ref 8.5–10.1)
CHLORIDE SERPL-SCNC: 108 MMOL/L (ref 94–109)
CO2 SERPL-SCNC: 33 MMOL/L (ref 20–32)
CREAT SERPL-MCNC: 0.94 MG/DL (ref 0.66–1.25)
ERYTHROCYTE [DISTWIDTH] IN BLOOD BY AUTOMATED COUNT: 13.6 % (ref 10–15)
GFR SERPL CREATININE-BSD FRML MDRD: 79 ML/MIN/{1.73_M2}
GLUCOSE SERPL-MCNC: 159 MG/DL (ref 70–99)
HCO3 BLD-SCNC: 33 MMOL/L (ref 21–28)
HCO3 BLD-SCNC: 35 MMOL/L (ref 21–28)
HCT VFR BLD AUTO: 35.5 % (ref 40–53)
HGB BLD-MCNC: 11.1 G/DL (ref 13.3–17.7)
MAGNESIUM SERPL-MCNC: 2.7 MG/DL (ref 1.6–2.3)
MCH RBC QN AUTO: 30.2 PG (ref 26.5–33)
MCHC RBC AUTO-ENTMCNC: 31.3 G/DL (ref 31.5–36.5)
MCV RBC AUTO: 97 FL (ref 78–100)
O2/TOTAL GAS SETTING VFR VENT: 45 %
O2/TOTAL GAS SETTING VFR VENT: 45 %
PCO2 BLD: 43 MM HG (ref 35–45)
PCO2 BLD: 46 MM HG (ref 35–45)
PH BLD: 7.48 PH (ref 7.35–7.45)
PH BLD: 7.5 PH (ref 7.35–7.45)
PHOSPHATE SERPL-MCNC: 3.4 MG/DL (ref 2.5–4.5)
PLATELET # BLD AUTO: 191 10E9/L (ref 150–450)
PO2 BLD: 83 MM HG (ref 80–105)
PO2 BLD: 86 MM HG (ref 80–105)
POTASSIUM SERPL-SCNC: 3.3 MMOL/L (ref 3.4–5.3)
POTASSIUM SERPL-SCNC: 3.8 MMOL/L (ref 3.4–5.3)
RBC # BLD AUTO: 3.68 10E12/L (ref 4.4–5.9)
RESEARCH KIT COLLECTION: NORMAL
RESEARCH KIT COLLECTION: NORMAL
SODIUM SERPL-SCNC: 144 MMOL/L (ref 133–144)
WBC # BLD AUTO: 8.4 10E9/L (ref 4–11)

## 2020-04-03 PROCEDURE — 40000275 ZZH STATISTIC RCP TIME EA 10 MIN

## 2020-04-03 PROCEDURE — 94003 VENT MGMT INPAT SUBQ DAY: CPT

## 2020-04-03 PROCEDURE — 83735 ASSAY OF MAGNESIUM: CPT | Performed by: STUDENT IN AN ORGANIZED HEALTH CARE EDUCATION/TRAINING PROGRAM

## 2020-04-03 PROCEDURE — 85027 COMPLETE CBC AUTOMATED: CPT | Performed by: STUDENT IN AN ORGANIZED HEALTH CARE EDUCATION/TRAINING PROGRAM

## 2020-04-03 PROCEDURE — 25000128 H RX IP 250 OP 636: Performed by: STUDENT IN AN ORGANIZED HEALTH CARE EDUCATION/TRAINING PROGRAM

## 2020-04-03 PROCEDURE — 82803 BLOOD GASES ANY COMBINATION: CPT | Performed by: STUDENT IN AN ORGANIZED HEALTH CARE EDUCATION/TRAINING PROGRAM

## 2020-04-03 PROCEDURE — 27210429 ZZH NUTRITION PRODUCT INTERMEDIATE LITER

## 2020-04-03 PROCEDURE — 84100 ASSAY OF PHOSPHORUS: CPT | Performed by: STUDENT IN AN ORGANIZED HEALTH CARE EDUCATION/TRAINING PROGRAM

## 2020-04-03 PROCEDURE — 25000132 ZZH RX MED GY IP 250 OP 250 PS 637: Mod: GY | Performed by: STUDENT IN AN ORGANIZED HEALTH CARE EDUCATION/TRAINING PROGRAM

## 2020-04-03 PROCEDURE — 25000125 ZZHC RX 250: Performed by: NURSE PRACTITIONER

## 2020-04-03 PROCEDURE — 84132 ASSAY OF SERUM POTASSIUM: CPT | Performed by: STUDENT IN AN ORGANIZED HEALTH CARE EDUCATION/TRAINING PROGRAM

## 2020-04-03 PROCEDURE — 25000128 H RX IP 250 OP 636: Performed by: INTERNAL MEDICINE

## 2020-04-03 PROCEDURE — 25000132 ZZH RX MED GY IP 250 OP 250 PS 637: Mod: GY | Performed by: NURSE PRACTITIONER

## 2020-04-03 PROCEDURE — 25000128 H RX IP 250 OP 636: Performed by: NURSE PRACTITIONER

## 2020-04-03 PROCEDURE — 94640 AIRWAY INHALATION TREATMENT: CPT

## 2020-04-03 PROCEDURE — 25800030 ZZH RX IP 258 OP 636: Performed by: STUDENT IN AN ORGANIZED HEALTH CARE EDUCATION/TRAINING PROGRAM

## 2020-04-03 PROCEDURE — 25800030 ZZH RX IP 258 OP 636: Performed by: INTERNAL MEDICINE

## 2020-04-03 PROCEDURE — 20000004 ZZH R&B ICU UMMC

## 2020-04-03 PROCEDURE — 25000125 ZZHC RX 250: Performed by: STUDENT IN AN ORGANIZED HEALTH CARE EDUCATION/TRAINING PROGRAM

## 2020-04-03 PROCEDURE — 80048 BASIC METABOLIC PNL TOTAL CA: CPT | Performed by: STUDENT IN AN ORGANIZED HEALTH CARE EDUCATION/TRAINING PROGRAM

## 2020-04-03 PROCEDURE — 25000132 ZZH RX MED GY IP 250 OP 250 PS 637: Mod: GY | Performed by: INTERNAL MEDICINE

## 2020-04-03 PROCEDURE — 99291 CRITICAL CARE FIRST HOUR: CPT | Performed by: INTERNAL MEDICINE

## 2020-04-03 RX ORDER — POTASSIUM CHLORIDE 1500 MG/1
20-40 TABLET, EXTENDED RELEASE ORAL
Status: ON HOLD | DISCHARGE
Start: 2020-04-03 | End: 2020-05-04

## 2020-04-03 RX ORDER — POTASSIUM CHLORIDE 29.8 MG/ML
20 INJECTION INTRAVENOUS
Status: ON HOLD | DISCHARGE
Start: 2020-04-03 | End: 2020-05-04

## 2020-04-03 RX ORDER — POTASSIUM CL/LIDO/0.9 % NACL 10MEQ/0.1L
10 INTRAVENOUS SOLUTION, PIGGYBACK (ML) INTRAVENOUS
Status: ON HOLD | DISCHARGE
Start: 2020-04-03 | End: 2020-05-04

## 2020-04-03 RX ORDER — ALBUTEROL SULFATE 90 UG/1
6 AEROSOL, METERED RESPIRATORY (INHALATION) EVERY 4 HOURS PRN
Status: ON HOLD | DISCHARGE
Start: 2020-04-03 | End: 2020-05-04

## 2020-04-03 RX ORDER — FENTANYL CITRATE 50 UG/ML
50-100 INJECTION, SOLUTION INTRAMUSCULAR; INTRAVENOUS
Refills: 0 | Status: ON HOLD | DISCHARGE
Start: 2020-04-03 | End: 2020-05-04

## 2020-04-03 RX ORDER — MAGNESIUM SULFATE HEPTAHYDRATE 40 MG/ML
4 INJECTION, SOLUTION INTRAVENOUS EVERY 4 HOURS PRN
Status: ON HOLD | DISCHARGE
Start: 2020-04-03 | End: 2020-05-04

## 2020-04-03 RX ORDER — POTASSIUM CHLORIDE 7.45 MG/ML
10 INJECTION INTRAVENOUS
Status: ON HOLD | DISCHARGE
Start: 2020-04-03 | End: 2020-05-04

## 2020-04-03 RX ORDER — POLYETHYLENE GLYCOL 3350 17 G/17G
17 POWDER, FOR SOLUTION ORAL DAILY PRN
Status: ON HOLD | DISCHARGE
Start: 2020-04-03 | End: 2020-05-04

## 2020-04-03 RX ORDER — POTASSIUM CHLORIDE 1.5 G/1.58G
20-40 POWDER, FOR SOLUTION ORAL
Status: ON HOLD | DISCHARGE
Start: 2020-04-03 | End: 2020-05-04

## 2020-04-03 RX ORDER — ALBUTEROL SULFATE 0.83 MG/ML
2.5 SOLUTION RESPIRATORY (INHALATION) DAILY
Status: ON HOLD | DISCHARGE
Start: 2020-04-04 | End: 2020-05-04

## 2020-04-03 RX ORDER — PIPERACILLIN SODIUM, TAZOBACTAM SODIUM 4; .5 G/20ML; G/20ML
4.5 INJECTION, POWDER, LYOPHILIZED, FOR SOLUTION INTRAVENOUS EVERY 6 HOURS
Status: ON HOLD | DISCHARGE
Start: 2020-04-03 | End: 2020-05-04

## 2020-04-03 RX ORDER — NALOXONE HYDROCHLORIDE 0.4 MG/ML
.1-.4 INJECTION, SOLUTION INTRAMUSCULAR; INTRAVENOUS; SUBCUTANEOUS ONCE
Status: ON HOLD | DISCHARGE
Start: 2020-04-03 | End: 2020-05-04

## 2020-04-03 RX ORDER — BISACODYL 10 MG
10 SUPPOSITORY, RECTAL RECTAL DAILY PRN
Status: ON HOLD | DISCHARGE
Start: 2020-04-03 | End: 2020-05-04

## 2020-04-03 RX ORDER — FUROSEMIDE 10 MG/ML
40 INJECTION INTRAMUSCULAR; INTRAVENOUS ONCE
Status: COMPLETED | OUTPATIENT
Start: 2020-04-03 | End: 2020-04-03

## 2020-04-03 RX ORDER — DOXAZOSIN 1 MG/1
1 TABLET ORAL DAILY
Status: ON HOLD | DISCHARGE
Start: 2020-04-04 | End: 2020-05-04

## 2020-04-03 RX ORDER — MIDAZOLAM (PF) 1 MG/ML IN 0.9 % SODIUM CHLORIDE INTRAVENOUS SOLUTION
1-8 CONTINUOUS
Status: ON HOLD | DISCHARGE
Start: 2020-04-03 | End: 2020-05-04

## 2020-04-03 RX ORDER — POTASSIUM CHLORIDE 1.5 G/1.58G
40 POWDER, FOR SOLUTION ORAL ONCE
Qty: 2 PACKET | Refills: 0 | Status: ON HOLD | OUTPATIENT
Start: 2020-04-03 | End: 2020-05-04

## 2020-04-03 RX ORDER — DEXMEDETOMIDINE HYDROCHLORIDE 4 UG/ML
0.2-0.7 INJECTION, SOLUTION INTRAVENOUS CONTINUOUS
Status: ON HOLD | DISCHARGE
Start: 2020-04-03 | End: 2020-05-04

## 2020-04-03 RX ORDER — MICONAZOLE NITRATE 20 MG/G
CREAM TOPICAL 2 TIMES DAILY
Status: ON HOLD | DISCHARGE
Start: 2020-04-03 | End: 2020-05-04

## 2020-04-03 RX ORDER — FAMOTIDINE 20 MG/1
20 TABLET, FILM COATED ORAL 2 TIMES DAILY
Status: ON HOLD | DISCHARGE
Start: 2020-04-03 | End: 2020-05-04

## 2020-04-03 RX ORDER — AMINO AC/PROTEIN HYDR/WHEY PRO 10G-100/30
1 LIQUID (ML) ORAL 4 TIMES DAILY
DISCHARGE
Start: 2020-04-03 | End: 2020-09-18

## 2020-04-03 RX ORDER — ONDANSETRON 4 MG/1
4 TABLET, ORALLY DISINTEGRATING ORAL EVERY 6 HOURS PRN
Status: ON HOLD | DISCHARGE
Start: 2020-04-03 | End: 2020-05-04

## 2020-04-03 RX ADMIN — ENOXAPARIN SODIUM 40 MG: 40 INJECTION SUBCUTANEOUS at 17:32

## 2020-04-03 RX ADMIN — FAMOTIDINE 20 MG: 20 TABLET ORAL at 10:48

## 2020-04-03 RX ADMIN — Medication 1000 UNITS: at 08:32

## 2020-04-03 RX ADMIN — MICONAZOLE NITRATE: 20 CREAM TOPICAL at 22:27

## 2020-04-03 RX ADMIN — ALBUTEROL SULFATE 2.5 MG: 2.5 SOLUTION RESPIRATORY (INHALATION) at 08:03

## 2020-04-03 RX ADMIN — POTASSIUM CHLORIDE 40 MEQ: 1.5 POWDER, FOR SOLUTION ORAL at 04:50

## 2020-04-03 RX ADMIN — Medication 1 PACKET: at 11:01

## 2020-04-03 RX ADMIN — Medication: at 22:22

## 2020-04-03 RX ADMIN — POTASSIUM CHLORIDE 20 MEQ: 1.5 POWDER, FOR SOLUTION ORAL at 08:31

## 2020-04-03 RX ADMIN — FAMOTIDINE 20 MG: 20 TABLET ORAL at 20:00

## 2020-04-03 RX ADMIN — Medication 1 INCH: at 09:11

## 2020-04-03 RX ADMIN — POTASSIUM CHLORIDE 20 MEQ: 1.5 POWDER, FOR SOLUTION ORAL at 16:35

## 2020-04-03 RX ADMIN — PIPERACILLIN SODIUM AND TAZOBACTAM SODIUM 4.5 G: 4; .5 INJECTION, POWDER, LYOPHILIZED, FOR SOLUTION INTRAVENOUS at 04:41

## 2020-04-03 RX ADMIN — PIPERACILLIN SODIUM AND TAZOBACTAM SODIUM 4.5 G: 4; .5 INJECTION, POWDER, LYOPHILIZED, FOR SOLUTION INTRAVENOUS at 16:46

## 2020-04-03 RX ADMIN — Medication 1 PACKET: at 16:35

## 2020-04-03 RX ADMIN — DORNASE ALFA 2.5 MG: 1 SOLUTION RESPIRATORY (INHALATION) at 08:03

## 2020-04-03 RX ADMIN — MICONAZOLE NITRATE: 20 CREAM TOPICAL at 08:40

## 2020-04-03 RX ADMIN — DEXMEDETOMIDINE 0.2 MCG/KG/HR: 100 INJECTION, SOLUTION, CONCENTRATE INTRAVENOUS at 22:27

## 2020-04-03 RX ADMIN — DOXAZOSIN 1 MG: 1 TABLET ORAL at 08:32

## 2020-04-03 RX ADMIN — MIDAZOLAM (PF) 1 MG/ML IN 0.9 % SODIUM CHLORIDE INTRAVENOUS SOLUTION 5 MG/HR: at 08:52

## 2020-04-03 RX ADMIN — Medication 1 PACKET: at 20:01

## 2020-04-03 RX ADMIN — Medication 1 PACKET: at 08:32

## 2020-04-03 RX ADMIN — Medication 200 MCG/HR: at 14:05

## 2020-04-03 RX ADMIN — FUROSEMIDE 40 MG: 10 INJECTION, SOLUTION INTRAMUSCULAR; INTRAVENOUS at 08:30

## 2020-04-03 RX ADMIN — Medication: at 03:45

## 2020-04-03 RX ADMIN — Medication 250 MCG/HR: at 04:41

## 2020-04-03 RX ADMIN — PIPERACILLIN SODIUM AND TAZOBACTAM SODIUM 4.5 G: 4; .5 INJECTION, POWDER, LYOPHILIZED, FOR SOLUTION INTRAVENOUS at 09:11

## 2020-04-03 RX ADMIN — AZITHROMYCIN MONOHYDRATE 500 MG: 500 INJECTION, POWDER, LYOPHILIZED, FOR SOLUTION INTRAVENOUS at 08:11

## 2020-04-03 RX ADMIN — PIPERACILLIN SODIUM AND TAZOBACTAM SODIUM 4.5 G: 4; .5 INJECTION, POWDER, LYOPHILIZED, FOR SOLUTION INTRAVENOUS at 22:21

## 2020-04-03 RX ADMIN — Medication: at 16:35

## 2020-04-03 RX ADMIN — MULTIVITAMIN 15 ML: LIQUID ORAL at 08:30

## 2020-04-03 ASSESSMENT — ACTIVITIES OF DAILY LIVING (ADL)
ADLS_ACUITY_SCORE: 15
ADLS_ACUITY_SCORE: 19
ADLS_ACUITY_SCORE: 15
ADLS_ACUITY_SCORE: 19

## 2020-04-03 ASSESSMENT — MIFFLIN-ST. JEOR: SCORE: 1751.75

## 2020-04-03 NOTE — PROGRESS NOTES
[facility] General ID Service: Brief note     Patient:  Eduardo Kemp  Date of birth 1945  Medical record number [id number]  Consult Requested by: MICU Team         Assessment and Recommendations:   Problem List:  1) COVID-19 - Dx 3/23/2020, enrolled on Remdesivir trial (D#1-03/26/20)  2) Acute hypoxic respiratory failure - required ventilator, pronation, paralytic  3)  Bilateral pneumonia - CXR - Bilateral diffuse interstitial and airspace opacities with basilar predominance (last imaging-CXR on 03/30), on Pip-Tazo and azithromycin (D#1-04/02)  # Fever-38.5C on 04/02, resolved  4) Thrombocytopenia -resolved; lymphocytopenia [id number] on 3/30  5) Elevated CK --> normalized   6) Minimally elevated troponin  7) Bradycardia-improved  8) S. epidermidis in blood culture (3/23) -1/1 positive, neg x2/2 (3/25)   9) Hx of HTN - controlled   10) Elevated  (3/25) ---> 250 (3/26) --> 240 (3/30)->200 (04/01)  11) elevated ferritin 2900 (3/25)-->1700 (3/30)-->963 (4/0)  12) Elevated D-Dimer>20.  13) Elevated IL-6, downtrending    Recommendations:  - enrolled in Remdesivir clinical trial and study drug per clinical research team  - Patient is okay to be transferred to better start and finish study drug over there  - Continue checking CRP, ferritin, IL-6, D-Dimer , LDH  every 3 days  - monitoring for cytokine release syndrome  - Continue Pip/Tazobactam x 7 days total  - monitor for signs of aspiration        Discussion:  Eduardo Kemp is a 74 year old male with HTN and remote history of tobacco use who was admitted to [facility] on 3/25 with COVID-19. Initially presented to [facility] on 3/23 with weakness, fever to 102.3, shortness of breath, and diarrhea for four days prior in setting of travel to [place]. He was subsequently admitted with COVID-19 testing sent on admission and he was started on cefdinir and azithromycin for possible LLL pneumonia. Early 3/25 he had respiratory decompensation with  shortness of breath, increased work of breathing, and hypoxia requiring intubation following which he was transferred first to [facility] and then to [facility]. Notable laboratory abnormalities lymphocytopenia with jak  (3/23), thrombocytopenia with jak 68 (3/24),  (3/25), and troponin 0.056 (3/25).    He is currently enrolled in remdesivir clinical trial (D#1-03/26). Given bilateral  infiltrate and hypoxic respiratory failure pt completed complete antibiotic course for community acquired pneumonia, but due to new fevers on 04/01-04/02 was restarted on azithromycin along with Pip-Tazo (04/02).  Patient will be transferred to [place] for further care, where will finish study drug. Clinically slightly improving.         Appreciate supportive care measures provided by MICU team.    ID will continue to follow peripherally. Discussed with Primary team.      Muna Smith MD, ID fellow, pager [phone]  Patient discussed with Dr. Lake.    Attestation:  This patient has been seen and evaluated by me.  I discussed this patient with the fellow Dr Smith  and agree with the findings and plan in this note. I also personally edited this note to reflect my findings. I have reviewed today's vital signs, medications, labs and imaging.    Douglas Lake MD,M.Med.Sc.  Infectious Diseases  Pager: [phone]             Interval history:     Patient is not examined.  Chart check performed.  Patient remained intubated, supinated with decreased oxygen requirements. On 04/02 with fevers up to 38.5, for which Pip-Tazo was recommended  (along with azithromycin started by primary team).  Procalcitonin- 0.15.  Chest x-ray from yesterday with decreased left pleural effusion, bilateral opacities increased from prior on the right, stable on the left, pulmonary edema versus infection.  CBC and BMP unremarkable.  Patient CRP improved to 200, LDH-338, CK normalized at 46, fibrinogen-513,  ferritin downtrending- 963 today from 2900 on admission. D-dimer>20.  IL-6 downtrending-152 on 4/02 from 202 on 03/29.  Patient is possibly to be transferred to [place].       History of Present Illness:   Eduardo Kemp is a 74 year old male with pertinent comorbid conditions of HTN who was admitted on 3/25/2020 as transfer for Covid-19 clinical trial and ID consulted for assistance with management.      Initially had symptoms of weakness and fatigue since travel to [place] about four days prior to admission at OSH. Seen on 3/21 in ED with dehydration and discharged then admitted to [place] 3/23 with weakness, fever (102), shortness of breath and diarrhea at which time flu was negative, covid sent, and cxr with left lung base patchy infiltrate. At admission SpO2 was in 80s and temp 102.3 Concern for CAP and started on azithromycin and cefdinir. Admission labs notable for mild lymphopenia and thrombocytopenia as well as hyponatremia and slightly elevated creatinine. Blood cultures with 1/2 positive for GPC identified as Staph epi and considered contaminant. Did have bradycardia to 30s with plan for nonurgent TTE at some point from cardiology. Overnight 3/24 - 3/25 noted to become more short of breath around 0200 and initially placed on 9L O2 via oxy mask. ABG done and 7.4/33/33 and subsequently intubated around 3am then transferred to [facility] where arterial line and central line were placed. Ceftriaxone and azithromycin not continued upon arrival.           Review of Systems:   Complete ROS obtained, pertinent positives and negatives as above.       Past Medical and Surgical History:   HTN  BPH      Allergies:      Allergies   Allergen Reactions     Ace Inhibitors      Possible angioedema 7/15.            Current Antimicrobials:   Cefdinir 3/23 - 3/24  Azithromycin 3/23 - 3/27  Ceftriaxone 3/24-3/29         Family History:   Cardiovascular disease noted in Care Everywhere (brother, father)          Social History:     Former smoker (20 pack years, quit 1980)  Alcohol use           Physical Exam:   Ranges forvital signs:  Temp:  [97.7  F (36.5  C)-100.4  F (38  C)] 97.9  F (36.6  C)  Heart Rate:  [56-71] 63  Resp:  [19-25] 19  MAP:  [67 mmHg-93 mmHg] 75 mmHg  Arterial Line BP: ()/(49-65) 103/55  FiO2 (%):  [45 %-65 %] 45 %  SpO2:  [94 %-98 %] 97 %    Exam: deferred to preserve PPE; patient intubated and sedated in ICU         Laboratory Data:   Reviewed via Care Everywhere.  3/23  WBC 3.9   -   HGB 14.6  PLT 73  INR 1.2  Na 131  Cr 1.21  UA small occult blood  D dimer 443    3/25  WBC 4.3  HGB 11  PLT 75  Trop 0.056  Na 137  Cr 0.77    ABG 7.4/33/33 --> 7.39/34/79    Culture data:  Covid + 3/23  Flu A/B - 3/23  Stool pcr - 3/23  Cdiff - 3/23  BCx +S epi (3/23) - presumed contaminant  BCx 3/25-negative           Imaging:   CXR 4/02  IMPRESSION:  1. Decrease in left pleural effusion. Right costophrenic angle is out  of the field-of-view.  2. Bilateral interstitial opacities with basilar predominance,  increased from prior on the right and stable on the left, likely  represent pulmonary edema versus infection.  3. Unchanged retrocardiac opacity, atelectasis versus infection.    CXR 3/30  Impression:   1. ET tube projects roughly 4 cm above the abe.  2. Slight decrease in left pleural effusion and associated  atelectasis.  3. Unchanged retrocardiac opacity and slight decrease in basilar  predominant bilateral diffuse interstitial and airspace opacities,  ARDS vs edema vs infection.     ECHO Portable 3/30  Interpretation Summary:  Borderline (EF 50-55%) reduced left ventricular function is present.  Right ventricular function, chamber size, wall motion, and thickness are  normal.  This study was compared with the study from 3/26/2020: LVEF has improved  slightly. Comparison is somewhat limited due to lack of contrast on the prior  study.    CXR 3/25  IMPRESSION:   1.  Right IJ line  terminates in the distal SVC 2.9 cm above the cavoatrial junction.  2.  Lines and tubes otherwise stable.  3.  Increased retrocardiac consolidation with bilateral asymmetric infiltrates again noted.    CXR 3/23  FINDINGS: Low lung volumes. There is patchy infiltrate seen at the left lung base with some infiltrate projecting over the heart and posteriorly on the lateral view. Findings likely reflect pneumonia. Right midlung opacity could also represent an area of pneumonia. No pleural effusion. Heart size is normal.    CT Head 3/23  IMPRESSION:  1.  No acute intracranial hemorrhage or acute abnormality of the brain.  2.  Mild diffuse cerebral atrophy and chronic white matter ischemic change.

## 2020-04-03 NOTE — PROGRESS NOTES
ICU End of Shift Summary. See flowsheets for vital signs and detailed assessment.    Changes this shift:  Decreased PEEP from 16 down to 14. ABG pending. Decreased sedation today, Versed gt at 3 mg/hr and Fentanyl gtt at 175 mcgf/hr, Precedex gtt at 0.2 mcg/kg/hr. Lasix 40 mg given. Urine output 1400 ml this shift. Pt is net positive 300 ml for the day.   Plan: Plan is to titrate sedation down as tolerated. Possible transfer to [place] tomorrow. Spoke with pt's wife by phone. Gave updates and answered questions.

## 2020-04-03 NOTE — PLAN OF CARE
OT 4C: Hold - continue to hold OT intervention, pt remains intubated/sedated with high vent requirements. Pt not yet appropriate to initiate therapies. Will continue to follow peripherally initiate when pt is able to actively participate in OT intervention.

## 2020-04-03 NOTE — DISCHARGE SUMMARY
[facility], [place]  Discharge/Transfer Summary - Medicine & Pediatrics       Date of Admission:  3/25/2020  Date of Discharge:  4/3/2020  Discharging Provider: Deann Reynoso  Discharge Service: MICU 2    Discharge Diagnoses     Acute hypoxic respiratory failure 2/2 COVID-19  ARDS 2/2 COVID-19  Superimposed pneumonia  T wave inversions  Hypokalemia  Elevated CRP, downtrending  Elevated Ferritin, downtrending  Elevated D-Dimer  Elevated IL-6, downtrending  Thrombocytopenia, resolved  Lymphopenia, Resolved  Bradycardia, improved  S. Epidermidis in 1/2 BC from OSH, suspect contaminant   Hx of HTN  Hx of BPH      Follow-ups Needed After Discharge   Transfer to [place] ICU for ongoing cares, sedation and ventilator weaning and rehab.    Per ID Recs:  - Patient is okay to be transferred to [place] and finish study drug over there  - Continue checking CRP, ferritin, IL-6, D-Dimer , LDH  every 3 days  - monitoring for cytokine release syndrome    Discharge Disposition   Transferred to [place] ICU  Condition at discharge: Stable    Hospital Course   Eduardo Kemp was admitted on 3/25/2020 for acute hypoxic respiratory failure 2/2 COVID-19.  The following problems were addressed during his hospitalization:    Mr. Kemp was enrolled in the Remdesivir trial upon admission to the ICU.     NEURO/PSYCH  Sedation  - Versed gtt, Versed prn  - Precedex gtt     Pain  - Fentanyl gtt, and fentanyl prn      Paralytics  Off of paralytics since 3/31.  - can start Vecuronium PRN if needed     Acute encephalopathy 2/2 sedatives  Patient initially presented weak following a syncopal event at home. A CT Head on 3/23 at OSH showed no acute intracranial hemorrhage or acute abnormality of the brain, and mild diffuse cerebral atrophy and chronic white matter ischemic change. Currently sedated.  - continue sedation, wean as able     CARDIOVASCULAR  Hemodynamics  Stable. Not requiring pressors.     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 Trop negative.  - TTE 3/30/20: Borderline (EF 50-55%) reduced left ventricular function is present. Right ventricular function, chamber size, wall motion, and thickness are normal. This study was compared with the study from 3/26/2020: LVEF has improved slightly. Comparison is somewhat limited due to lack of contrast on the prior study.    Bradycardia, resolved  Cardiology at OSH consulted. Tele without evidence of pauses or heart block. Per cards note, bradycardia may be due to hypoxia, apnea with sleep and increased vagal tone, and his infection. TTE at that time showing mild LV reduction 40-45%, global RV function reduction.  - telemetry     Decreased RV function per TTE, resolved  Likely d/t ARDS. Resolved on echo 3/30/20.  - treat ARDS (below)     Troponin  Initial trop <0.056. Trop on arrival 0.036 (still wnl). EKG on arrival showing sinus bradycardia. Troponin 3/28, 3/30, and 4/1 <0.015  - Will check trop intermittently as part of COVID labs q48 hrs      H/o HTN  -Hold PTA amlodipine 5 mg   -Hold PTA hydrochlorothiazide 12.5 mg daily     PULMONARY  ARDS  Acute Hypoxic Respiratory Failure 2/2 COVID-19 s/p intubation  Ventilation Mode: CMV/AC  (Continuous Mandatory Ventilation/ Assist Control)  FiO2 (%): 45 %  Rate Set (breaths/minute): 19 breaths/min  Tidal Volume Set (mL): 500 mL  PEEP (cm H2O): 16 cmH2O  Oxygen Concentration (%): 45 %  Resp: 19  - serial ABG's   - 66 kg= ideal BW   - Keep lungs dry with lasix, aim for net even daily (receiving 40 IV lasix daily or BID)  - With new LLL infiltrate and increased fever on 4/2/20, start Zosyn and Azithromycin for aspiration coverage (04/02/20 - current)  - Start Albuterol and Pulmozyme 4/2/20     GI  Bowel Regimen  - Senna BID and Miralax BID  - 3/28 - x1 mag citrate  - 3/28 suppository   - rectal tube     Nutrition:   Inadequate oral  intake related to NPO status in setting of intubation   - nutrition consulted   - NJ placed   - TF limited to 40/hr per Dr. Reynoso     Stress Ulcer prophylaxis: Famotidine     RENAL  Hypokalemia  In the setting of diuresis.  - K replacement protocol  - Mg and Phos replacement protocol  - Daily BMP     Contraction Alkalosis  2/2 diuresis.     H/o BPH  -PTA on tamsulosin 0.4 mg daily     Volume - Euvolemic  S/p IVF at OSH in setting of initial dehydration. Now aiming for net even to net negative.     HEME/ONC  Normocytic Anemia  Hgb 13 --> 11.1, no signs of bleeding.   -Monitor.     Lymphopenia, Resolved  Likely 2/2 COVID infection.    Thrombocytopenia, resolved     # Transfusion History: none     Elevated D-dimer  Suspect secondary to Covid infection.  04/01/20: US b/l upper and lower: no DVT     ENDOCRINE  BG stable. HgbA1c 5.2.  - BG checks QID     HypoVit D  -Started cholecalciferol 1000 qd     INFECTIOUS DISEASE  Sepsis  ARDS 2/2 COVID-19 Infection  Elevated CRP, downtrending  Elevated Ferritin, downtrending  Elevated D-Dimer  Elevated IL-6, downtrending  Initially had extensive infectious work-up at OSH. C Diff neg, enteric panel neg. Flu A and B neg. Strep pneumo and Legionella neg. UA neg. CXR showing bilateral pulmonary infiltrates. COVID testing positive on 03/25/20. OSH discussed with [facility].   - Infectious disease consulted; Remdesivir study participant   -Restarted abx on 4/2 in setting of increasing fever and LLL infiltrate on CXR  - Contact, droplet, airborne precautions  - Per ID recs: - Continue checking CRP, ferritin, IL-6, D-Dimer , LDH  every 3 days  - monitoring for cytokine release syndrome  - no Tylenol or NSAIDS for fevers   - Ferritin:1687 --> 2,950 -->1726 --> 963  -  --> 250 -->  260 -> 240 --> 200  - D-dimer: 2.2 -> 2.9 -> >20  - CK always wnl  - LDH: 556 --> 452 --> 330  -  IL-6: 202 on 3/29 --> 153 on 4/2     # Antimicrobials  Ceftriaxone 2 g IV, 5 day course, duration until  03/27  Azithromycin 250 mg every day, 5 day course, duration until 03/27  Zosyn 3.375g q8, (04/02/20 - current)  Azithromycin (04/02/20 - current)     # Cultures  -OSH BC 3/23 - 1/1 staph epidermidis  - Blood culture 03/25/20: NGTD after 5 days     SKIN/MSK  PT and OT consult     General Cares/Prophylaxis:    DVT Prophylaxis: Enoxaparin (Lovenox) SQ  GI Prophylaxis: PPI  Restraints: none  Family Communication: Update wife daily     Lines/tubes/drains:  RIJ and R brachial artery a line  ETT, OG      Disposition: Stable for transfer to [place] ICU     Consultations This Hospital Stay   INFECTIOUS DISEASE GENERAL ADULT IP CONSULT  PHYSICAL THERAPY ADULT IP CONSULT  OCCUPATIONAL THERAPY ADULT IP CONSULT  NUTRITION SERVICES ADULT IP CONSULT  PHARMACY IP CONSULT  MEDICATION HISTORY IP PHARMACY CONSULT  NUTRITION SERVICES ADULT IP CONSULT    Code Status   Full Code     The patient was seen and discussed with Dr. Reynoso, attending physician.    Krzysztof Owens   Internal Medicine, PGY1  MICU2 Service  [facility], [place]  Pager: [phone]  ______________________________________________________________________    Physical Exam   Vital Signs: Temp: 97.7  F (36.5  C) Temp src: Bladder     Heart Rate: 59 Resp: 19 SpO2: 97 % O2 Device: Mechanical Ventilator    Weight: 231 lbs .67 oz    General: Intubated and sedated, supine  HEENT: Normocephalic, PERRL, ETT in place  Resp: Synchronous with ventilator  Cardiac: RRR  GI: Nondistended  Extremities: No significant edema  : fungal appearing rash, fatima in place  Skin: No rashes      Primary Care Physician   SIMON LENZ    Discharge Orders      Mantoux instructions    Give two-step Mantoux (PPD) Per Facility Policy Yes     Reason for your hospital stay    Eduardo Kemp was admitted on 3/25/2020 for acute hypoxic respiratory failure 2/2 COVID-19.  The following problems were addressed during his hospitalization:     Mr. Kemp was enrolled in the  Remdesivir trial upon admission to the ICU.     Glucose monitor nursing POCT    Before meals and at bedtime     Intake and output    Every shift     Daily weights    Call Provider for weight gain of more than 2 pounds per day or 5 pounds per week.     Bladder scan    X 2 for post void residual     Al catheter    To straight gravity drainage. Change catheter every 2 weeks and PRN for leaking or decreased uring output with signs of bladder distention. DO NOT change catheter without a specific MD order IF diagnosis of benign prostatic hypertrophy (BPH), neurogenic bladder, or other urological conditions     Activity - Up ad giuliana     Full Code     Nutrition Services Adult IP Consult    Reason: Continue tube feeds     Contact Isolation     Airborne Isolation     Droplet Isolation     Invasive Ventilator    Vent Documentation:   In effort to reduce and/or prevent hospitalizations and emergency room visits, we are recommending a(n) Invasive Ventilator    I, the undersigned, certify that the above prescribed supplies are medically necessary for this patient and is both reasonable and necessary in reference to accepted standards of medical and necessary in reference to accepted standards of medical practice in the treatment of this patient's condition and is not prescribed as a convenience.       Significant Results and Procedures   Most Recent 3 CBC's:  Recent Labs   Lab Test 04/03/20  0356 04/02/20  0303 04/01/20  0409   WBC 8.4 7.7 6.0   HGB 11.1* 10.6* 11.9*   MCV 97 97 97    167 184     Most Recent 3 BMP's:  Recent Labs   Lab Test 04/03/20  0356 04/02/20  0303 04/01/20  0409    143 142   POTASSIUM 3.3* 3.6 4.0   CHLORIDE 108 106 107   CO2 33* 33* 31   BUN 30 31* 29   CR 0.94 0.99 0.84   ANIONGAP 3 4 5   RICKY 8.1* 8.1* 8.3*   * 141* 136*     Most Recent 2 LFT's:  Recent Labs   Lab Test 03/31/20  0402 03/30/20  0313   AST 56* 77*   ALT 45 54   ALKPHOS 57 53   BILITOTAL 0.4 0.5     Most Recent 3  INR's:  Recent Labs   Lab Test 04/01/20  0409 03/31/20  0402 03/25/20  1447   INR 1.30* 1.29* 1.11     Most Recent 3 Troponin's:  Recent Labs   Lab Test 04/01/20  0409 03/30/20  0313 03/28/20  0348   TROPI <0.015 <0.015 <0.015     Most Recent D-dimer:  Recent Labs   Lab Test 04/01/20  0524   DD >20.0*     Most Recent ABG:  Recent Labs   Lab Test 04/03/20  0356   PH 7.48*   PO2 86   PCO2 46*   HCO3 35*   LASHAUN 9.2     Most Recent ESR & CRP:  Recent Labs   Lab Test 04/01/20  0409   .0*     Most Recent CPK:  Recent Labs   Lab Test 03/31/20  0402 03/29/20  0316 03/28/20  0348   CKT 46 60 104       Discharge Medications   Current Discharge Medication List      START taking these medications    Details   albuterol (PROAIR HFA/PROVENTIL HFA/VENTOLIN HFA) 108 (90 Base) MCG/ACT inhaler Inhale 6 puffs into the lungs every 4 hours as needed for shortness of breath / dyspnea  Qty:      Comments: Pharmacy may dispense brand covered by insurance (Proair, or proventil or ventolin or generic albuterol inhaler)  Associated Diagnoses: COVID-19 virus infection      albuterol (PROVENTIL) (2.5 MG/3ML) 0.083% neb solution Take 1 vial (2.5 mg) by nebulization daily  Qty:      Associated Diagnoses: COVID-19 virus infection      artificial tears OINT ophthalmic ointment Place 1 g into both eyes every 6 hours    Associated Diagnoses: COVID-19 virus infection      azithromycin 500 mg Inject 500 mg into the vein every 24 hours  Qty:      Associated Diagnoses: Aspiration pneumonia, unspecified aspiration pneumonia type, unspecified laterality, unspecified part of lung (H)      bisacodyl (DULCOLAX) 10 MG suppository Place 1 suppository (10 mg) rectally daily as needed for constipation  Qty:      Associated Diagnoses: COVID-19 virus infection      cholecalciferol (D-VI-SOL, VITAMIN D3) 10 MCG/ML (400 units/ml) LIQD liquid Take 2.5 mLs (1,000 Units) by mouth daily  Qty:      Associated Diagnoses: COVID-19 virus infection      dexmedetomidine  (PRECEDEX) 400 MCG/100ML SOLN infusion Inject 21.7-75.95 mcg/hr into the vein continuous  Qty:      Associated Diagnoses: COVID-19 virus infection      dornase alpha (PULMOZYME) 1 MG/ML neb solution Inhale 2.5 mg into the lungs daily  Qty:      Associated Diagnoses: COVID-19 virus infection      doxazosin (CARDURA) 1 MG tablet 1 tablet (1 mg) by Oral or Feeding Tube route daily  Qty:      Associated Diagnoses: COVID-19 virus infection      enoxaparin ANTICOAGULANT (LOVENOX) 40 MG/0.4ML syringe Inject 0.4 mLs (40 mg) Subcutaneous every 24 hours  Qty:      Associated Diagnoses: COVID-19 virus infection      famotidine (PEPCID) 20 MG tablet 1 tablet (20 mg) by Oral or Feeding Tube route 2 times daily  Qty:      Associated Diagnoses: COVID-19 virus infection      fentaNYL, PF, (SUBLIMAZE) 100 MCG/2ML injection Inject 1-2 mLs ( mcg) into the vein every hour as needed  Qty:  , Refills: 0    Associated Diagnoses: COVID-19 virus infection      magnesium sulfate 4 GM/100ML SOLN infusion Inject 100 mLs (4 g) into the vein every 4 hours as needed for magnesium supplementation  Qty:      Associated Diagnoses: COVID-19 virus infection      miconazole with skin protectant (CEASAR ANTIFUNGAL) 2 % CREA cream Apply topically 2 times daily  Qty:      Associated Diagnoses: COVID-19 virus infection      midazolam (VERSED) 1 MG/ML injection Inject 1-2 mLs (1-2 mg) into the vein every hour as needed for sedation  Qty:      Associated Diagnoses: COVID-19 virus infection      midazolam (VERSED) drip - ADULT 100 mg/100 mL in NS PRE-MIX Inject 1-8 mg/hr into the vein continuous  Qty:      Associated Diagnoses: COVID-19 virus infection      multivitamins w/minerals (CERTAVITE) liquid 15 mLs by Per Feeding Tube route daily  Qty:      Associated Diagnoses: COVID-19 virus infection      naloxone (NARCAN) 0.4 MG/ML injection Inject 0.25-1 mLs (0.1-0.4 mg) into the vein once for 1 dose    Associated Diagnoses: COVID-19 virus infection       ondansetron (ZOFRAN-ODT) 4 MG ODT tab Take 1 tablet (4 mg) by mouth every 6 hours as needed for nausea or vomiting  Qty:      Associated Diagnoses: COVID-19 virus infection      piperacillin-tazobactam (ZOSYN) 4-0.5 GM vial Inject 4.5 g into the vein every 6 hours  Qty:      Associated Diagnoses: Aspiration pneumonia, unspecified aspiration pneumonia type, unspecified laterality, unspecified part of lung (H)      polyethylene glycol (MIRALAX) packet 17 g by Oral or Feeding Tube route daily as needed for constipation  Qty:      Associated Diagnoses: COVID-19 virus infection      !! potassium chloride (KLOR-CON) 20 MEQ packet 20-40 mEq by Oral or Feeding Tube route every 2 hours as needed for potassium supplementation  Qty:      Associated Diagnoses: COVID-19 virus infection      !! potassium chloride (KLOR-CON) 20 MEQ packet 40 mEq by Oral or Feeding Tube route once for 1 dose  Qty: 2 packet, Refills: 0    Associated Diagnoses: COVID-19 virus infection      potassium chloride 10 mEq in 100 mL intermittent infusion with 10 mg lidocaine Inject 100 mLs (10 mEq) into the vein every hour as needed for potassium supplementation  Qty:      Associated Diagnoses: COVID-19 virus infection      potassium chloride 10 mEq in 100 mL sterile water intermittent infusion (premix) Inject 100 mLs (10 mEq) into the vein every hour as needed  Qty:      Associated Diagnoses: COVID-19 virus infection      potassium chloride 20 MEQ/50ML infusion Inject 50 mLs (20 mEq) into the vein every hour as needed for potassium supplementation  Qty:      Associated Diagnoses: COVID-19 virus infection      potassium chloride ER (KLOR-CON M) 20 MEQ CR tablet Take 1-2 tablets (20-40 mEq) by mouth every 2 hours as needed for potassium supplementation  Qty:      Associated Diagnoses: COVID-19 virus infection      sodium chloride, PF, 0.9% PF flush Inject 10 mLs into the vein once for 1 dose  Qty: 10 mL, Refills: 0    Associated Diagnoses: COVID-19 virus  infection      !! sodium phosphate 15 mmol Inject 15 mmol into the vein daily as needed for phosphorous supplementation  Qty:      Associated Diagnoses: COVID-19 virus infection      !! sodium phosphate 20 mmol Inject 20 mmol into the vein every 6 hours as needed for phosphorous supplementation  Qty:      Associated Diagnoses: COVID-19 virus infection      !! sodium phosphate 25 mmol Inject 25 mmol into the vein every 8 hours as needed for phosphorous supplementation  Qty:      Associated Diagnoses: COVID-19 virus infection       !! - Potential duplicate medications found. Please discuss with provider.      CONTINUE these medications which have NOT CHANGED    Details   Glucos-MSM-C-Ug-Lhtiuo-Nqvskl (GLUCOSAMINE MSM COMPLEX) TABS tablet Take 1 tablet by mouth daily      protein modular (PROSOURCE TF) LIQD 1 packet by Per Feeding Tube route 4 times daily  Qty:      Associated Diagnoses: COVID-19 virus infection         STOP taking these medications       amLODIPine (NORVASC) 5 MG tablet Comments:   Reason for Stopping:         aspirin (ASA) 81 MG chewable tablet Comments:   Reason for Stopping:         calcium polycarbophil (FIBERCON) 625 MG tablet Comments:   Reason for Stopping:         docusate sodium (COLACE) 100 MG tablet Comments:   Reason for Stopping:         hydrochlorothiazide (HYDRODIURIL) 12.5 MG tablet Comments:   Reason for Stopping:         multivitamin, therapeutic (THERA-VIT) TABS tablet Comments:   Reason for Stopping:         niacin 500 MG tablet Comments:   Reason for Stopping:         sildenafil (REVATIO) 20 MG tablet Comments:   Reason for Stopping:         tamsulosin (FLOMAX) 0.4 MG capsule Comments:   Reason for Stopping:             Allergies   Allergies   Allergen Reactions     Ace Inhibitors      Possible angioedema 7/15.

## 2020-04-03 NOTE — PLAN OF CARE
PT evaluation cx.  Patient remains medically inappropriate for PT.  Will reassess potential to evaluate on 4/6.

## 2020-04-03 NOTE — PLAN OF CARE
ICU End of Shift Summary. See flowsheets for vital signs and detailed assessment.    Changes this shift: No major changes overnight. FiO2 weaned to 45%. Remains RASS -5, not overbreathing the vent at all, only occasional coughs with suctioning. Remains on versed, fentanyl, and precedex. HR high 50s-low 60s, BP WNL. Urine output ~50ml/hr. Tmax 99.5. Potassium replaced per protocol. Miconazole ordered for rashy groin area.     Plan: Continue to wean FiO2 as tolerated, plan to start weaning versed today. Notify team of changes.

## 2020-04-03 NOTE — PROGRESS NOTES
MEDICAL ICU PROGRESS NOTE  04/03/2020      Date of Service (when I saw the patient): 04/03/2020    ASSESSMENT: Eduardo Kemp is a 75 YO M with a h/o HTN who was admitted for AHRF and developed ARDS 2/2 COVID-19 (positive on 03/23/20 at an OSH) and transferred to [facility] on 03/25/20 for participation in the Remdesivir clinical trial. He remains intubated and sedated.      CHANGES and MAJOR THINGS TODAY:   - Started Miconazole for groin rash  - Stable throughout day; remained supine  - Attempted to transfer to [facility];   - ID will send clinical trial drug with patient when/if he transfers  - Gave Lasix 40 mg IV 1x in AM  - Weaning down Versed; remains steady on Precedex  - Potassium repleted    PLAN:  NEURO/PSYCH  # Sedation  - Versed gtt, Versed prn (weaning down)  - Precedex gtt     # Pain  - Fentanyl gtt, and fentanyl prn      # Paralytics  Off of paralytics.  - can start Vecuronium if needed    # Acute encephalopathy 2/2 sedatives  Patient initially presented weak following a syncopal event at home. A CT Head on 3/23 at OSH showed no acute intracranial hemorrhage or acute abnormality of the brain, and mild diffuse cerebral atrophy and chronic white matter ischemic change. Currently sedated.  - continue sedation     CARDIOVASCULAR  # Hemodynamics  Stable.      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 Trop negative this AM.  - TTE: Borderline (EF 50-55%) reduced left ventricular function is present. Right ventricular function, chamber size, wall motion, and thickness are normal. This study was compared with the study from 3/26/2020: LVEF has improved slightly. Comparison is somewhat limited due to lack of contrast on the prior study.     # Bradycardia  Cardiology at OSH consulted. Tele without evidence of pauses or heart block. Per cards note, bradycardia may be due to hypoxia, apnea with sleep and increased vagal  tone, and his infection. TTE showing mild LV reduction 40-45%, global RV function reduction.  - telemetry     # Decreased RV function per TTE  Likely d/t ARDS.   - treat ARDS (below)    # Elevated Troponin  Initial trop <0.056. Trop on arrival 0.036. EKG on arrival showing sinus bradycardia. Troponin 3/28 <0.015  - Will check trop as part of COVID labs q48 hrs      # H/o HTN  -Hold PTA amlodipine 5 mg   -Hold PTA hydrochlorothiazide 12.5 mg daily     PULMONARY  # ARDS  # Acute Hypoxic Respiratory Failure 2/2 COVID-19   Ventilation Mode: CMV/AC  (Continuous Mandatory Ventilation/ Assist Control)  FiO2 (%): 45 %  Rate Set (breaths/minute): 19 breaths/min  Tidal Volume Set (mL): 500 mL  PEEP (cm H2O): (S) 14 cmH2O (changed per MD)  Oxygen Concentration (%): 45 %  Resp: 19  - serial ABG's   - artifical tears  - 66 kg= ideal BW   - Keep lungs dry  - s/p Lasix 40 IV and albumin (x2)  -  Zosyn for aspiration coverage (04/02/20 - current; 7 day course)  - Azithromycin (04/02/20; 3 day course)  - Albuterol and Pulmozyme    GI  # Bowel Regimen  - Senna BID and Miralax BID  - 3/28 - x1 mag citrate  - 3/28 suppository   - rectal tube    # Nutrition:   - nutrition consulted   - NJ placed   - TF limited to 40/hr     # Stress Ulcer prophylaxis: Famotidine     RENAL  # Hypokalemia  - K replacement protocol  - Mg and Phos replacement protocol  - BMP     # Contraction Alkalosis  2/2 diuresis.    # BPH  -PTA on tamsulosin 0.4 mg daily;     # Volume - Euvolemic  S/p IVF at OSH in setting of initial dehydration. S/p Lasix 03/29/20 for net neg 1.5L     HEME/ONC  # Normocytic Anemia  Hgb 13, no signs of bleeding.   -Monitor.      # Transfusion History: none    # Elevated D-dimer  04/01/20: US b/l upper and lower: no DVT     ENDOCRINE  BG stable. HgbA1c 5.2.  - BG checks QID    HypoVit D  - cholecalciferol 1000 qd     INFECTIOUS DISEASE  # Sepsis  # COVID-19 Infection  Initially had extensive infectious work-up at OSH. C Diff neg, enteric  panel neg. Flu A and B neg. Strep pneumo and Legionella neg. UA neg. CXR showing bilateral pulmonary infiltrates. COVID testing positive on 03/25/20. OSH discussed with RACHELL.   - Infectious disease consulted; Remdesivir study participant  - Contact, droplet, airborne precautions  - continue checking COVID labs: trop, CRP, ferritin q48 hrs (next 3/30)  - trend CBC with diff, CMP, CK  - no Tylenol or NSAIDS for fevers   - 03/28/20 Ferritin: 2,950 (from 1687) -> 03/30/20: 1726  - CRP 3/25 180 --> 3/26 250 --> 3/28;  03/30/20 260 -> 240  - 03/29/20 D-dimer: 2.2 -> 2.9 -> >20  - 03/29/20 CK: 60   - 03/29/20 LDH: 452  - 03/29/20 IL-6: 202; talk to ID  - 03/29/20 Pro-BNP: 161  Fibrinogen: 513    # Antimicrobials  Ceftriaxone 2 g IV, 5 day course, duration until 03/27  Azithromycin 250 mg every day, 5 day course, duration until 03/27  Zosyn 3.375g q8, (04/02/20 - current)  Azithromycin (04/02/20 - current)     # Cultures  -OSH BC 3/23 - 1/1 staph epidermidis  - Blood culture 03/25/20: NGTD after 5 days     SKIN/MSK  PT and OT consult    General Cares/Prophylaxis:    DVT Prophylaxis: Enoxaparin (Lovenox) SQ  GI Prophylaxis: PPI  Restraints: none  Family Communication: will call wife    Lines/tubes/drains:  RIJ and R brachial artery               ETT, OG     Disposition:  - Medical ICU     The patient was seen and discussed with Dr. Reynoso, attending physician.     Krzysztof Owens Jr, MD  Internal Medicine PGY-1  [phone]    ====================================  INTERVAL HISTORY:   Started Miconazole ON for groin rash  Gave 1x Lasix 40 mg IV this AM    OBJECTIVE:   1. VITAL SIGNS:   Temp:  [97.3  F (36.3  C)-99.5  F (37.5  C)] 97.3  F (36.3  C)  Heart Rate:  [56-71] 57  Resp:  [19-21] 19  BP: (107-111)/(62) 111/62  MAP:  [67 mmHg-93 mmHg] 86 mmHg  Arterial Line BP: ()/(43-65) 119/63  FiO2 (%):  [45 %-50 %] 45 %  SpO2:  [94 %-98 %] 96 %  Ventilation Mode: CMV/AC  (Continuous Mandatory Ventilation/ Assist Control)  FiO2  (%): 45 %  Rate Set (breaths/minute): 19 breaths/min  Tidal Volume Set (mL): 500 mL  PEEP (cm H2O): (S) 14 cmH2O (changed per MD)  Oxygen Concentration (%): 45 %  Resp: 19    2. INTAKE/ OUTPUT:   I/O last 3 completed shifts:  In: 2966.68 [I.V.:1316.68; Other:275; NG/GT:415]  Out: 3055 [Urine:2905; Stool:150]  Net 24 hrs: - 160 cc  Since admit: - 245 cc     Drips:  - Paralytic: None  - Analgesia: Fentanyl 200 mcg/hr  - Sedation: versed 7 mL/hr    3. PHYSICAL EXAMINATION:  General: proned man in bed  HEENT: NC, AT, MMM  Neuro: sedated, paralyzed  Pulm/Resp: synchronous with vent  CV: bradycardic-low normal rate, normal rhythm on tele  Abdomen: did not evaluate  : did not evaluate  Incisions/Skin: no visible rashes    4. LABS:   Arterial Blood Gases   Recent Labs   Lab 04/03/20  0356 04/02/20  1606 04/02/20  1334 04/02/20  0303   PH 7.48* 7.47* 7.53* 7.50*   PCO2 46* 47* 41 47*   PO2 86 103 117* 61*   HCO3 35* 34* 34* 37*     Complete Blood Count   Recent Labs   Lab 04/03/20  0356 04/02/20  0303 04/01/20  0409 03/31/20  0402   WBC 8.4 7.7 6.0 5.7   HGB 11.1* 10.6* 11.9* 12.5*    167 184 186     Basic Metabolic Panel  Recent Labs   Lab 04/03/20  1328 04/03/20  0356 04/02/20  0303 04/01/20  0409 03/31/20  0402   NA  --  144 143 142 142   POTASSIUM 3.8 3.3* 3.6 4.0 3.5   CHLORIDE  --  108 106 107 106   CO2  --  33* 33* 31 32   BUN  --  30 31* 29 28   CR  --  0.94 0.99 0.84 0.73   GLC  --  159* 141* 136* 146*     Liver Function Tests  Recent Labs   Lab 04/01/20  0409 03/31/20  0402 03/30/20  0313 03/29/20  0316 03/28/20  0348   AST  --  56* 77* 120* 77*   ALT  --  45 54 64 37   ALKPHOS  --  57 53 51 44   BILITOTAL  --  0.4 0.5 0.4 0.4   ALBUMIN  --  1.6* 1.7* 1.7* 1.6*   INR 1.30* 1.29*  --   --   --      Pancreatic Enzymes  No lab results found in last 7 days.  Coagulation Profile  Recent Labs   Lab 04/01/20  0409 03/31/20  0402   INR 1.30* 1.29*   PTT 37  --      5. RADIOLOGY:   No results found for this or any  previous visit (from the past 24 hour(s)).  03/25/20 TTE  Mildly (EF 40-45%) reduced left ventricular function is present.  Global right ventricular function is mildly reduced.  Unable to assess mean RA pressure given the patient is on a ventilator.  No pericardial effusion is present.  There is no prior study for direct comparison.

## 2020-04-04 ENCOUNTER — TRANSFERRED RECORDS (OUTPATIENT)
Dept: HEALTH INFORMATION MANAGEMENT | Facility: CLINIC | Age: 75
End: 2020-04-04

## 2020-04-04 VITALS
OXYGEN SATURATION: 96 % | SYSTOLIC BLOOD PRESSURE: 113 MMHG | DIASTOLIC BLOOD PRESSURE: 67 MMHG | WEIGHT: 234.79 LBS | HEIGHT: 67 IN | TEMPERATURE: 98.1 F | BODY MASS INDEX: 36.85 KG/M2 | RESPIRATION RATE: 19 BRPM

## 2020-04-04 LAB
ANION GAP SERPL CALCULATED.3IONS-SCNC: 1 MMOL/L (ref 3–14)
BASE EXCESS BLDA CALC-SCNC: 8 MMOL/L
BUN SERPL-MCNC: 31 MG/DL (ref 7–30)
CALCIUM SERPL-MCNC: 7.9 MG/DL (ref 8.5–10.1)
CHLORIDE SERPL-SCNC: 112 MMOL/L (ref 94–109)
CO2 SERPL-SCNC: 32 MMOL/L (ref 20–32)
CREAT SERPL-MCNC: 0.8 MG/DL (ref 0.66–1.25)
CRP SERPL-MCNC: 160 MG/L (ref 0–8)
D DIMER PPP FEU-MCNC: 18.2 UG/ML FEU (ref 0–0.5)
ERYTHROCYTE [DISTWIDTH] IN BLOOD BY AUTOMATED COUNT: 13.4 % (ref 10–15)
GFR SERPL CREATININE-BSD FRML MDRD: 87 ML/MIN/{1.73_M2}
GLUCOSE SERPL-MCNC: 146 MG/DL (ref 70–99)
HCO3 BLD-SCNC: 33 MMOL/L (ref 21–28)
HCT VFR BLD AUTO: 35 % (ref 40–53)
HGB BLD-MCNC: 10.9 G/DL (ref 13.3–17.7)
LDH SERPL L TO P-CCNC: 283 U/L (ref 85–227)
MAGNESIUM SERPL-MCNC: 2.6 MG/DL (ref 1.6–2.3)
MCH RBC QN AUTO: 30.6 PG (ref 26.5–33)
MCHC RBC AUTO-ENTMCNC: 31.1 G/DL (ref 31.5–36.5)
MCV RBC AUTO: 98 FL (ref 78–100)
O2/TOTAL GAS SETTING VFR VENT: 45 %
PCO2 BLD: 47 MM HG (ref 35–45)
PH BLD: 7.46 PH (ref 7.35–7.45)
PHOSPHATE SERPL-MCNC: 2.9 MG/DL (ref 2.5–4.5)
PLATELET # BLD AUTO: 192 10E9/L (ref 150–450)
PO2 BLD: 90 MM HG (ref 80–105)
POTASSIUM SERPL-SCNC: 3.8 MMOL/L (ref 3.4–5.3)
RBC # BLD AUTO: 3.56 10E12/L (ref 4.4–5.9)
SODIUM SERPL-SCNC: 145 MMOL/L (ref 133–144)
WBC # BLD AUTO: 7.9 10E9/L (ref 4–11)

## 2020-04-04 PROCEDURE — 40000275 ZZH STATISTIC RCP TIME EA 10 MIN

## 2020-04-04 PROCEDURE — 85027 COMPLETE CBC AUTOMATED: CPT | Performed by: STUDENT IN AN ORGANIZED HEALTH CARE EDUCATION/TRAINING PROGRAM

## 2020-04-04 PROCEDURE — 25000128 H RX IP 250 OP 636: Performed by: NURSE PRACTITIONER

## 2020-04-04 PROCEDURE — 25000128 H RX IP 250 OP 636: Performed by: STUDENT IN AN ORGANIZED HEALTH CARE EDUCATION/TRAINING PROGRAM

## 2020-04-04 PROCEDURE — 25000132 ZZH RX MED GY IP 250 OP 250 PS 637: Mod: GY | Performed by: STUDENT IN AN ORGANIZED HEALTH CARE EDUCATION/TRAINING PROGRAM

## 2020-04-04 PROCEDURE — 86140 C-REACTIVE PROTEIN: CPT | Performed by: STUDENT IN AN ORGANIZED HEALTH CARE EDUCATION/TRAINING PROGRAM

## 2020-04-04 PROCEDURE — 82803 BLOOD GASES ANY COMBINATION: CPT | Performed by: STUDENT IN AN ORGANIZED HEALTH CARE EDUCATION/TRAINING PROGRAM

## 2020-04-04 PROCEDURE — 85379 FIBRIN DEGRADATION QUANT: CPT | Performed by: STUDENT IN AN ORGANIZED HEALTH CARE EDUCATION/TRAINING PROGRAM

## 2020-04-04 PROCEDURE — 83735 ASSAY OF MAGNESIUM: CPT | Performed by: STUDENT IN AN ORGANIZED HEALTH CARE EDUCATION/TRAINING PROGRAM

## 2020-04-04 PROCEDURE — 40000937 ZZHCL STATISTIC RESEARCH KIT COLLECTION: Performed by: INTERNAL MEDICINE

## 2020-04-04 PROCEDURE — 84100 ASSAY OF PHOSPHORUS: CPT | Performed by: STUDENT IN AN ORGANIZED HEALTH CARE EDUCATION/TRAINING PROGRAM

## 2020-04-04 PROCEDURE — 25800030 ZZH RX IP 258 OP 636: Performed by: INTERNAL MEDICINE

## 2020-04-04 PROCEDURE — 25000125 ZZHC RX 250: Performed by: STUDENT IN AN ORGANIZED HEALTH CARE EDUCATION/TRAINING PROGRAM

## 2020-04-04 PROCEDURE — 83615 LACTATE (LD) (LDH) ENZYME: CPT | Performed by: STUDENT IN AN ORGANIZED HEALTH CARE EDUCATION/TRAINING PROGRAM

## 2020-04-04 PROCEDURE — 94003 VENT MGMT INPAT SUBQ DAY: CPT

## 2020-04-04 PROCEDURE — 25000132 ZZH RX MED GY IP 250 OP 250 PS 637: Mod: GY | Performed by: NURSE PRACTITIONER

## 2020-04-04 PROCEDURE — 40000014 ZZH STATISTIC ARTERIAL MONITORING DAILY

## 2020-04-04 PROCEDURE — 80048 BASIC METABOLIC PNL TOTAL CA: CPT | Performed by: STUDENT IN AN ORGANIZED HEALTH CARE EDUCATION/TRAINING PROGRAM

## 2020-04-04 PROCEDURE — 25000132 ZZH RX MED GY IP 250 OP 250 PS 637: Mod: GY | Performed by: INTERNAL MEDICINE

## 2020-04-04 PROCEDURE — 27210429 ZZH NUTRITION PRODUCT INTERMEDIATE LITER

## 2020-04-04 PROCEDURE — 25000128 H RX IP 250 OP 636: Performed by: INTERNAL MEDICINE

## 2020-04-04 PROCEDURE — 94640 AIRWAY INHALATION TREATMENT: CPT

## 2020-04-04 RX ADMIN — DOXAZOSIN 1 MG: 1 TABLET ORAL at 08:18

## 2020-04-04 RX ADMIN — MIDAZOLAM 2 MG: 1 INJECTION INTRAMUSCULAR; INTRAVENOUS at 12:00

## 2020-04-04 RX ADMIN — DORNASE ALFA 2.5 MG: 1 SOLUTION RESPIRATORY (INHALATION) at 07:37

## 2020-04-04 RX ADMIN — Medication 1 PACKET: at 11:11

## 2020-04-04 RX ADMIN — ALBUTEROL SULFATE 2.5 MG: 2.5 SOLUTION RESPIRATORY (INHALATION) at 07:37

## 2020-04-04 RX ADMIN — FENTANYL CITRATE 75 MCG: 50 INJECTION, SOLUTION INTRAMUSCULAR; INTRAVENOUS at 12:00

## 2020-04-04 RX ADMIN — PIPERACILLIN SODIUM AND TAZOBACTAM SODIUM 4.5 G: 4; .5 INJECTION, POWDER, LYOPHILIZED, FOR SOLUTION INTRAVENOUS at 03:35

## 2020-04-04 RX ADMIN — Medication 175 MCG/HR: at 01:49

## 2020-04-04 RX ADMIN — AZITHROMYCIN MONOHYDRATE 500 MG: 500 INJECTION, POWDER, LYOPHILIZED, FOR SOLUTION INTRAVENOUS at 08:09

## 2020-04-04 RX ADMIN — Medication 1000 UNITS: at 08:18

## 2020-04-04 RX ADMIN — POTASSIUM CHLORIDE 20 MEQ: 1.5 POWDER, FOR SOLUTION ORAL at 08:08

## 2020-04-04 RX ADMIN — Medication: at 03:36

## 2020-04-04 RX ADMIN — FAMOTIDINE 20 MG: 20 TABLET ORAL at 08:08

## 2020-04-04 RX ADMIN — PIPERACILLIN SODIUM AND TAZOBACTAM SODIUM 4.5 G: 4; .5 INJECTION, POWDER, LYOPHILIZED, FOR SOLUTION INTRAVENOUS at 11:09

## 2020-04-04 RX ADMIN — MULTIVITAMIN 15 ML: LIQUID ORAL at 08:08

## 2020-04-04 RX ADMIN — MICONAZOLE NITRATE: 20 CREAM TOPICAL at 08:12

## 2020-04-04 RX ADMIN — Medication 1 PACKET: at 08:08

## 2020-04-04 ASSESSMENT — ACTIVITIES OF DAILY LIVING (ADL)
ADLS_ACUITY_SCORE: 19
ADLS_ACUITY_SCORE: 19
ADLS_ACUITY_SCORE: 21
ADLS_ACUITY_SCORE: 19

## 2020-04-04 ASSESSMENT — MIFFLIN-ST. JEOR: SCORE: 1768.75

## 2020-04-04 NOTE — PLAN OF CARE
Shift Summary [id number]:    Neuro: Pt remains unresponsive to sternal rub. Cough present with suctioning. Pt sedated on Versed, Fentanyl, and Precedex.     Cardiac: SB/SR. HR 50s-60s. MAPs>65.    Respiratory:  On CMV settings (, Peep 14, RR 19) on 45%. Lung sounds clear/diminished. Moderate amount of thick, tan secretions present in ETT.    GI/: Hypoactive bowel sounds present. NJ with tube feeding infusing. Rectal tube in place with loose stool output. Urine output 30-60ml/hr.    Lines: R IJ x3. PIVx1. R brachial arterial line.    No family called RN for updates overnight.  Continue to monitor and update MD as needed. Continue plan of care.

## 2020-04-04 NOTE — DISCHARGE SUMMARY
[facility], [place]  Discharge/Transfer Summary - Medicine & Pediatrics       Date of Admission:  3/25/2020  Date of Discharge:  4/3/2020  Discharging Provider: Dr. Roscoe Mosqueda  Discharge Service: MICU 2    Discharge Diagnoses     Acute hypoxic respiratory failure 2/2 COVID-19  ARDS 2/2 COVID-19  Superimposed pneumonia  T wave inversions  Hypokalemia  Elevated CRP, downtrending  Elevated Ferritin, downtrending  Elevated D-Dimer  Elevated IL-6, downtrending  Thrombocytopenia, resolved  Lymphopenia, Resolved  Bradycardia, improved  S. Epidermidis in 1/2 BC from OSH, suspect contaminant   Hx of HTN  Hx of BPH    Follow-ups Needed After Discharge   Transfer to [facility] ICU for ongoing cares, sedation and ventilator weaning and rehab.    Per ID Recs:  - Patient is okay to be transferred to [place] and finish study drug over there  - Continue checking CRP, ferritin, IL-6, D-Dimer , LDH  every 3 days  - monitoring for cytokine release syndrome    Discharge Disposition   Transferred to [facility] ICU  Condition at discharge: Stable    Hospital Course   Eduardo Kemp was admitted on 3/25/2020 for acute hypoxic respiratory failure 2/2 COVID-19.  The following problems were addressed during his hospitalization:    Mr. Kemp was enrolled in the Remdesivir trial upon admission to the ICU.     NEURO/PSYCH  Sedation  Pain  - Versed gtt - 2-3 mg/hr, Versed prn  - Fentanyl gtt - 175 mcg/hr, and fentanyl prn   - Precedex gtt - off  - Work on weaning at [facility]     Paralytics  Off of paralytics since 3/31.  - can start Vecuronium PRN if needed     Acute encephalopathy 2/2 sedatives  Patient initially presented weak following a syncopal event at home. A CT Head on 3/23 at OSH showed no acute intracranial hemorrhage or acute abnormality of the brain, and mild diffuse cerebral atrophy and chronic white matter ischemic change. Currently sedated.  - continue sedation, wean as  able     CARDIOVASCULAR  Hemodynamics  Stable. Not requiring pressors.     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 Trop negative.  - TTE 3/30/20: Borderline (EF 50-55%) reduced left ventricular function is present. Right ventricular function, chamber size, wall motion, and thickness are normal. This study was compared with the study from 3/26/2020: LVEF has improved slightly. Comparison is somewhat limited due to lack of contrast on the prior study.    Bradycardia, resolved  Cardiology at OSH consulted. Tele without evidence of pauses or heart block. Per cards note, bradycardia may be due to hypoxia, apnea with sleep and increased vagal tone, and his infection. TTE at that time showing mild LV reduction 40-45%, global RV function reduction.  - telemetry     Decreased RV function per TTE, resolved  Likely d/t ARDS. Resolved on echo 3/30/20.  - treat ARDS (below)     Troponin  Initial trop <0.056. Trop on arrival 0.036 (still wnl). EKG on arrival showing sinus bradycardia. Troponin 3/28, 3/30, and 4/1 <0.015  - Will check trop intermittently as part of COVID labs q48 hrs      H/o HTN  -Hold PTA amlodipine 5 mg   -Hold PTA hydrochlorothiazide 12.5 mg daily     PULMONARY  ARDS  Acute Hypoxic Respiratory Failure 2/2 COVID-19 s/p intubation  Ventilation Mode: CMV/AC  (Continuous Mandatory Ventilation/ Assist Control)  FiO2 (%): 45 %  Rate Set (breaths/minute): 19 breaths/min  Tidal Volume Set (mL): 500 mL  PEEP (cm H2O): 14 cmH2O  Oxygen Concentration (%): 45 %  Resp: 20  - serial ABG's   - 66 kg= ideal BW   - Keep lungs dry with lasix, aim for net even daily (receiving 40 IV lasix daily or BID)  - With new LLL infiltrate and increased fever on 4/2/20, start Zosyn and Azithromycin for aspiration coverage (04/02/20 - current)  - Start Albuterol and Pulmozyme 4/2/20  - Plan was to start to wean PEEP next     GI  Bowel  Regimen  - Senna BID and Miralax BID  - 3/28 - x1 mag citrate  - 3/28 suppository   - rectal tube     Nutrition:   Inadequate oral intake related to NPO status in setting of intubation   - nutrition consulted   - NJ placed   - TF limited to 40/hr per Dr. Reynoso   -FWF increased from 30 --> 40 q4h on 4/4 (given hypernatremia to 145)     Stress Ulcer prophylaxis: Famotidine     RENAL  Hypokalemia  In the setting of diuresis.  - K replacement protocol  - Mg and Phos replacement protocol  - Daily BMP     Contraction Alkalosis  2/2 diuresis.    Hypernatremia   145 on 4/4.  -Increased FWF from 30 --> 40 q4h on 4/4     H/o BPH  -PTA on tamsulosin 0.4 mg daily     Volume - Euvolemic  S/p IVF at OSH in setting of initial dehydration. Now aiming for net even to net negative.     HEME/ONC  Normocytic Anemia  Hgb 13 --> 11.1, no signs of bleeding.   -Monitor.     Lymphopenia, Resolved  Likely 2/2 COVID infection.    Thrombocytopenia, resolved     # Transfusion History: none     Elevated D-dimer  Suspect secondary to Covid infection.  04/01/20: US b/l upper and lower: no DVT     ENDOCRINE  BG stable. HgbA1c 5.2.  - BG checks QID     HypoVit D  -Started cholecalciferol 1000 qd     INFECTIOUS DISEASE  Sepsis  ARDS 2/2 COVID-19 Infection  Elevated CRP, downtrending  Elevated Ferritin, downtrending  Elevated D-Dimer  Elevated IL-6, downtrending  Initially had extensive infectious work-up at OSH. C Diff neg, enteric panel neg. Flu A and B neg. Strep pneumo and Legionella neg. UA neg. CXR showing bilateral pulmonary infiltrates. COVID testing positive on 03/25/20. OSH discussed with MDTHOMAS.   - Infectious disease consulted; Remdesivir study participant   -Restarted abx on 4/2 in setting of increasing fever and LLL infiltrate on CXR  - Contact, droplet, airborne precautions  - Per ID recs: - Continue checking CRP, ferritin, IL-6, D-Dimer , LDH  every 3 days  - monitoring for cytokine release syndrome  - no Tylenol or NSAIDS for fevers   -  Ferritin:1687 --> 2,950 -->1726 --> 963  -  --> 250 -->  260 -> 240 --> 200  - D-dimer: 2.2 -> 2.9 -> >20 --> 18.2  - CK always wnl  - LDH: 556 --> 452 --> 330 --> 283  -  IL-6: 202 on 3/29 --> 153 on 4/2     # Antimicrobials  Ceftriaxone 2 g IV, 5 day course, duration until 03/27  Azithromycin 250 mg every day, 5 day course, duration until 03/27  Zosyn 3.375g q8, (04/02/20 - current)  Azithromycin (04/02/20 - 4/4/20) - stop     # Cultures  -OSH BC 3/23 - 1/1 staph epidermidis  - Blood culture 03/25/20: NGTD after 5 days     SKIN/MSK  PT and OT consult     General Cares/Prophylaxis:    DVT Prophylaxis: Enoxaparin (Lovenox) SQ  GI Prophylaxis: PPI  Restraints: none  Family Communication: Update wife daily     Lines/tubes/drains:  RIJ and R brachial artery a line  ETT, OG, rectal tube, fatima     Disposition: Stable for transfer to [facility] ICU     Consultations This Hospital Stay   INFECTIOUS DISEASE GENERAL ADULT IP CONSULT  PHYSICAL THERAPY ADULT IP CONSULT  OCCUPATIONAL THERAPY ADULT IP CONSULT  NUTRITION SERVICES ADULT IP CONSULT  PHARMACY IP CONSULT  MEDICATION HISTORY IP PHARMACY CONSULT  NUTRITION SERVICES ADULT IP CONSULT  NUTRITION SERVICES ADULT IP CONSULT  NUTRITION SERVICES ADULT IP CONSULT    Code Status   Full Code     The patient was seen and discussed with Dr. Mosqueda, attending physician.    Krzysztof Owens   Internal Medicine, PGY1  MICU2 Service  [facility], [place]  Pager: [phone]  ______________________________________________________________________    Physical Exam   Vital Signs: Temp: 98.1  F (36.7  C) Temp src: Bladder BP: 117/68   Heart Rate: 75 Resp: 20 SpO2: 97 % O2 Device: Mechanical Ventilator    Weight: 234 lbs 12.64 oz    General: Intubated and sedated, supine  HEENT: Normocephalic, PERRL, ETT in place  Resp: Synchronous with ventilator  Cardiac: RRR  GI: Nondistended  Extremities: No significant edema  : fungal appearing rash, fatima in  place  Skin: No rashes      Primary Care Physician   SIMON LENZ    Discharge Orders      Mantoux instructions    Give two-step Mantoux (PPD) Per Facility Policy Yes     Reason for your hospital stay    Eduardo Kemp was admitted on 3/25/2020 for acute hypoxic respiratory failure 2/2 COVID-19.  The following problems were addressed during his hospitalization:     Mr. Kemp was enrolled in the Remdesivir trial upon admission to the ICU.     Glucose monitor nursing POCT    Before meals and at bedtime     Intake and output    Every shift     Daily weights    Call Provider for weight gain of more than 2 pounds per day or 5 pounds per week.     Bladder scan    X 2 for post void residual     Al catheter    To straight gravity drainage. Change catheter every 2 weeks and PRN for leaking or decreased uring output with signs of bladder distention. DO NOT change catheter without a specific MD order IF diagnosis of benign prostatic hypertrophy (BPH), neurogenic bladder, or other urological conditions     Activity - Up ad giuliana     Follow Up and recommended labs and tests    Per [facility] treatment team rec's     Full Code     Nutrition Services Adult IP Consult    Reason: Continue tube feeds     Nutrition Services Adult IP Consult    Reason:  Continue tube feeds     Nutrition Services Adult IP Consult    Reason:  Cont tube feeds     Contact Isolation     Airborne Isolation     Droplet Isolation     Adult Formula Tube Feeding    Follow this regimen upon discharge: Orders Placed This Encounter      Adult Formula Drip Feeding: Continuous TwoCal HN; Nasoduodenal tube; Goal Rate: 40; mL/hr; Medication - Feeding Tube Flush Frequency: At least 15-30 mL water before and after medication administration and with tube clogging; Amount to Send (Nutrit...      NPO for Medical/Clinical Reasons Except for: NPO but receiving Tube Feeding     Fall precautions     Invasive Ventilator    Vent Documentation:   In effort to reduce and/or  prevent hospitalizations and emergency room visits, we are recommending a(n) Invasive Ventilator    I, the undersigned, certify that the above prescribed supplies are medically necessary for this patient and is both reasonable and necessary in reference to accepted standards of medical and necessary in reference to accepted standards of medical practice in the treatment of this patient's condition and is not prescribed as a convenience.     Advance Diet as Tolerated    Follow this diet upon discharge: Orders Placed This Encounter      Adult Formula Drip Feeding: Continuous TwoCal HN; Nasoduodenal tube; Goal Rate: 40; mL/hr; Medication - Feeding Tube Flush Frequency: At least 15-30 mL water before and after medication administration and with tube clogging; Amount to Send (Nutrit...      Adult Formula Tube Feeding      NPO for Medical/Clinical Reasons Except for: NPO but receiving Tube Feeding       Significant Results and Procedures   Most Recent 3 CBC's:  Recent Labs   Lab Test 04/04/20  0406 04/03/20  0356 04/02/20  0303   WBC 7.9 8.4 7.7   HGB 10.9* 11.1* 10.6*   MCV 98 97 97    191 167     Most Recent 3 BMP's:  Recent Labs   Lab Test 04/04/20  0406 04/03/20  1328 04/03/20  0356 04/02/20  0303   *  --  144 143   POTASSIUM 3.8 3.8 3.3* 3.6   CHLORIDE 112*  --  108 106   CO2 32  --  33* 33*   BUN 31*  --  30 31*   CR 0.80  --  0.94 0.99   ANIONGAP 1*  --  3 4   RICKY 7.9*  --  8.1* 8.1*   *  --  159* 141*     Most Recent 2 LFT's:  Recent Labs   Lab Test 03/31/20  0402 03/30/20  0313   AST 56* 77*   ALT 45 54   ALKPHOS 57 53   BILITOTAL 0.4 0.5     Most Recent 3 INR's:  Recent Labs   Lab Test 04/01/20  0409 03/31/20  0402 03/25/20  1447   INR 1.30* 1.29* 1.11     Most Recent 3 Troponin's:  Recent Labs   Lab Test 04/01/20  0409 03/30/20  0313 03/28/20  0348   TROPI <0.015 <0.015 <0.015     Most Recent D-dimer:  Recent Labs   Lab Test 04/04/20  0406   DD 18.2*     Most Recent ABG:  Recent Labs   Lab  Test 04/04/20  0406   PH 7.46*   PO2 90   PCO2 47*   HCO3 33*   LASHAUN 8.0     Most Recent ESR & CRP:  Recent Labs   Lab Test 04/01/20  0409   .0*     Most Recent CPK:  Recent Labs   Lab Test 03/31/20  0402 03/29/20  0316 03/28/20  0348   CKT 46 60 104       Discharge Medications   Current Discharge Medication List      START taking these medications    Details   albuterol (PROAIR HFA/PROVENTIL HFA/VENTOLIN HFA) 108 (90 Base) MCG/ACT inhaler Inhale 6 puffs into the lungs every 4 hours as needed for shortness of breath / dyspnea  Qty:      Comments: Pharmacy may dispense brand covered by insurance (Proair, or proventil or ventolin or generic albuterol inhaler)  Associated Diagnoses: COVID-19 virus infection      albuterol (PROVENTIL) (2.5 MG/3ML) 0.083% neb solution Take 1 vial (2.5 mg) by nebulization daily  Qty:      Associated Diagnoses: COVID-19 virus infection      artificial tears OINT ophthalmic ointment Place 1 g into both eyes every 6 hours    Associated Diagnoses: COVID-19 virus infection      azithromycin 500 mg Inject 500 mg into the vein every 24 hours  Qty:      Associated Diagnoses: Aspiration pneumonia, unspecified aspiration pneumonia type, unspecified laterality, unspecified part of lung (H)      bisacodyl (DULCOLAX) 10 MG suppository Place 1 suppository (10 mg) rectally daily as needed for constipation  Qty:      Associated Diagnoses: COVID-19 virus infection      cholecalciferol (D-VI-SOL, VITAMIN D3) 10 MCG/ML (400 units/ml) LIQD liquid Take 2.5 mLs (1,000 Units) by mouth daily  Qty:      Associated Diagnoses: COVID-19 virus infection      dexmedetomidine (PRECEDEX) 400 MCG/100ML SOLN infusion Inject 21.7-75.95 mcg/hr into the vein continuous  Qty:      Associated Diagnoses: COVID-19 virus infection      dornase alpha (PULMOZYME) 1 MG/ML neb solution Inhale 2.5 mg into the lungs daily  Qty:      Associated Diagnoses: COVID-19 virus infection      doxazosin (CARDURA) 1 MG tablet 1 tablet (1  mg) by Oral or Feeding Tube route daily  Qty:      Associated Diagnoses: COVID-19 virus infection      enoxaparin ANTICOAGULANT (LOVENOX) 40 MG/0.4ML syringe Inject 0.4 mLs (40 mg) Subcutaneous every 24 hours  Qty:      Associated Diagnoses: COVID-19 virus infection      famotidine (PEPCID) 20 MG tablet 1 tablet (20 mg) by Oral or Feeding Tube route 2 times daily  Qty:      Associated Diagnoses: COVID-19 virus infection      fentaNYL, PF, (SUBLIMAZE) 100 MCG/2ML injection Inject 1-2 mLs ( mcg) into the vein every hour as needed  Qty:  , Refills: 0    Associated Diagnoses: COVID-19 virus infection      magnesium sulfate 4 GM/100ML SOLN infusion Inject 100 mLs (4 g) into the vein every 4 hours as needed for magnesium supplementation  Qty:      Associated Diagnoses: COVID-19 virus infection      miconazole with skin protectant (CEASAR ANTIFUNGAL) 2 % CREA cream Apply topically 2 times daily  Qty:      Associated Diagnoses: COVID-19 virus infection      midazolam (VERSED) 1 MG/ML injection Inject 1-2 mLs (1-2 mg) into the vein every hour as needed for sedation  Qty:      Associated Diagnoses: COVID-19 virus infection      midazolam (VERSED) drip - ADULT 100 mg/100 mL in NS PRE-MIX Inject 1-8 mg/hr into the vein continuous  Qty:      Associated Diagnoses: COVID-19 virus infection      multivitamins w/minerals (CERTAVITE) liquid 15 mLs by Per Feeding Tube route daily  Qty:      Associated Diagnoses: COVID-19 virus infection      naloxone (NARCAN) 0.4 MG/ML injection Inject 0.25-1 mLs (0.1-0.4 mg) into the vein once for 1 dose    Associated Diagnoses: COVID-19 virus infection      ondansetron (ZOFRAN-ODT) 4 MG ODT tab Take 1 tablet (4 mg) by mouth every 6 hours as needed for nausea or vomiting  Qty:      Associated Diagnoses: COVID-19 virus infection      piperacillin-tazobactam (ZOSYN) 4-0.5 GM vial Inject 4.5 g into the vein every 6 hours  Qty:      Associated Diagnoses: Aspiration pneumonia, unspecified aspiration  pneumonia type, unspecified laterality, unspecified part of lung (H)      polyethylene glycol (MIRALAX) packet 17 g by Oral or Feeding Tube route daily as needed for constipation  Qty:      Associated Diagnoses: COVID-19 virus infection      !! potassium chloride (KLOR-CON) 20 MEQ packet 20-40 mEq by Oral or Feeding Tube route every 2 hours as needed for potassium supplementation  Qty:      Associated Diagnoses: COVID-19 virus infection      !! potassium chloride (KLOR-CON) 20 MEQ packet 40 mEq by Oral or Feeding Tube route once for 1 dose  Qty: 2 packet, Refills: 0    Associated Diagnoses: COVID-19 virus infection      potassium chloride 10 mEq in 100 mL intermittent infusion with 10 mg lidocaine Inject 100 mLs (10 mEq) into the vein every hour as needed for potassium supplementation  Qty:      Associated Diagnoses: COVID-19 virus infection      potassium chloride 10 mEq in 100 mL sterile water intermittent infusion (premix) Inject 100 mLs (10 mEq) into the vein every hour as needed  Qty:      Associated Diagnoses: COVID-19 virus infection      potassium chloride 20 MEQ/50ML infusion Inject 50 mLs (20 mEq) into the vein every hour as needed for potassium supplementation  Qty:      Associated Diagnoses: COVID-19 virus infection      potassium chloride ER (KLOR-CON M) 20 MEQ CR tablet Take 1-2 tablets (20-40 mEq) by mouth every 2 hours as needed for potassium supplementation  Qty:      Associated Diagnoses: COVID-19 virus infection      sodium chloride, PF, 0.9% PF flush Inject 10 mLs into the vein once for 1 dose  Qty: 10 mL, Refills: 0    Associated Diagnoses: COVID-19 virus infection      !! sodium phosphate 15 mmol Inject 15 mmol into the vein daily as needed for phosphorous supplementation  Qty:      Associated Diagnoses: COVID-19 virus infection      !! sodium phosphate 20 mmol Inject 20 mmol into the vein every 6 hours as needed for phosphorous supplementation  Qty:      Associated Diagnoses: COVID-19 virus  infection      !! sodium phosphate 25 mmol Inject 25 mmol into the vein every 8 hours as needed for phosphorous supplementation  Qty:      Associated Diagnoses: COVID-19 virus infection       !! - Potential duplicate medications found. Please discuss with provider.      CONTINUE these medications which have NOT CHANGED    Details   Glucos-MSM-C-Ap-Ugrmiq-Zyyveb (GLUCOSAMINE MSM COMPLEX) TABS tablet Take 1 tablet by mouth daily      protein modular (PROSOURCE TF) LIQD 1 packet by Per Feeding Tube route 4 times daily  Qty:      Associated Diagnoses: COVID-19 virus infection         STOP taking these medications       amLODIPine (NORVASC) 5 MG tablet Comments:   Reason for Stopping:         aspirin (ASA) 81 MG chewable tablet Comments:   Reason for Stopping:         calcium polycarbophil (FIBERCON) 625 MG tablet Comments:   Reason for Stopping:         docusate sodium (COLACE) 100 MG tablet Comments:   Reason for Stopping:         hydrochlorothiazide (HYDRODIURIL) 12.5 MG tablet Comments:   Reason for Stopping:         multivitamin, therapeutic (THERA-VIT) TABS tablet Comments:   Reason for Stopping:         niacin 500 MG tablet Comments:   Reason for Stopping:         sildenafil (REVATIO) 20 MG tablet Comments:   Reason for Stopping:         tamsulosin (FLOMAX) 0.4 MG capsule Comments:   Reason for Stopping:             Allergies   Allergies   Allergen Reactions     Ace Inhibitors      Possible angioedema 7/15.

## 2020-04-04 NOTE — PLAN OF CARE
Transferred to: [facility] at ~1230  Status at time of transfer: VSS, vented. Bolused 2 mg Versed and 75 mcg Fentanyl prior to transport.  Belongings: Sent via los (EMS refused large bag of belongings at time of transport, [place] charge RN notified that belongings will be sent later).  Chart and medications: Sent with EMS, faxed.   Family notified:  Wife notified, consent received.      Problem: Cardiac Disease Comorbidity  Goal: Cardiac Disease  Description: Patient comorbidity will be monitored for signs and symptoms of Cardiac Disease.  Problems will be absent, minimized or managed by discharge/transition of care.  4/4/2020 1306 by Margaret Li, RN  Outcome: No Change     Problem: Hypertension Comorbidity  Goal: Blood Pressure in Desired Range  4/4/2020 1306 by Margaret Li, RN  Outcome: No Change

## 2020-04-06 ENCOUNTER — RECORDS - HEALTHEAST (OUTPATIENT)
Dept: PULMONOLOGY | Facility: CLINIC | Age: 75
End: 2020-04-06

## 2020-04-06 ENCOUNTER — ANESTHESIA - HEALTHEAST (OUTPATIENT)
Dept: PULMONOLOGY | Facility: CLINIC | Age: 75
End: 2020-04-06

## 2020-04-06 LAB
DEPRECATED CALCIDIOL+CALCIFEROL SERPL-MC: <40 UG/L (ref 20–75)
VITAMIN D2 SERPL-MCNC: <5 UG/L
VITAMIN D3 SERPL-MCNC: 35 UG/L

## 2020-04-08 LAB
RESEARCH KIT COLLECTION: NORMAL
RESEARCH KIT COLLECTION: NORMAL

## 2020-04-27 ENCOUNTER — HOSPITAL ENCOUNTER (INPATIENT)
Facility: CLINIC | Age: 75
LOS: 8 days | Discharge: HOME HEALTH | DRG: 177 | End: 2020-05-05
Attending: PHYSICAL MEDICINE & REHABILITATION | Admitting: PHYSICAL MEDICINE & REHABILITATION
Payer: MEDICARE

## 2020-04-27 DIAGNOSIS — U07.1 COVID-19 VIRUS INFECTION: ICD-10-CM

## 2020-04-27 DIAGNOSIS — N40.1 BENIGN PROSTATIC HYPERPLASIA WITH URINARY FREQUENCY: ICD-10-CM

## 2020-04-27 DIAGNOSIS — R53.81 DEBILITY: Primary | ICD-10-CM

## 2020-04-27 DIAGNOSIS — I10 ESSENTIAL HYPERTENSION, BENIGN: ICD-10-CM

## 2020-04-27 DIAGNOSIS — R35.0 BENIGN PROSTATIC HYPERPLASIA WITH URINARY FREQUENCY: ICD-10-CM

## 2020-04-27 LAB
CREAT SERPL-MCNC: 0.76 MG/DL (ref 0.66–1.25)
GFR SERPL CREATININE-BSD FRML MDRD: 89 ML/MIN/{1.73_M2}
PLATELET # BLD AUTO: 194 10E9/L (ref 150–450)

## 2020-04-27 PROCEDURE — 99207 ZZC CONSULT E&M CHANGED TO INITIAL LEVEL: CPT | Performed by: PHYSICIAN ASSISTANT

## 2020-04-27 PROCEDURE — 25000128 H RX IP 250 OP 636: Performed by: PHYSICAL MEDICINE & REHABILITATION

## 2020-04-27 PROCEDURE — 12800006 ZZH R&B REHAB

## 2020-04-27 PROCEDURE — 25000132 ZZH RX MED GY IP 250 OP 250 PS 637: Mod: GY | Performed by: PHYSICAL MEDICINE & REHABILITATION

## 2020-04-27 PROCEDURE — 85049 AUTOMATED PLATELET COUNT: CPT | Performed by: PHYSICAL MEDICINE & REHABILITATION

## 2020-04-27 PROCEDURE — 82565 ASSAY OF CREATININE: CPT | Performed by: PHYSICAL MEDICINE & REHABILITATION

## 2020-04-27 PROCEDURE — 36415 COLL VENOUS BLD VENIPUNCTURE: CPT | Performed by: PHYSICAL MEDICINE & REHABILITATION

## 2020-04-27 PROCEDURE — 99222 1ST HOSP IP/OBS MODERATE 55: CPT | Performed by: PHYSICIAN ASSISTANT

## 2020-04-27 RX ORDER — FUROSEMIDE 20 MG/1
10 TABLET ORAL DAILY
Status: DISCONTINUED | OUTPATIENT
Start: 2020-04-28 | End: 2020-04-27

## 2020-04-27 RX ORDER — AMOXICILLIN 250 MG
1-4 CAPSULE ORAL 2 TIMES DAILY PRN
Status: DISCONTINUED | OUTPATIENT
Start: 2020-04-27 | End: 2020-05-03

## 2020-04-27 RX ORDER — LANOLIN ALCOHOL/MO/W.PET/CERES
3 CREAM (GRAM) TOPICAL
Status: DISCONTINUED | OUTPATIENT
Start: 2020-04-27 | End: 2020-05-05 | Stop reason: HOSPADM

## 2020-04-27 RX ORDER — HYDROCHLOROTHIAZIDE 12.5 MG/1
12.5 CAPSULE ORAL DAILY
Status: DISCONTINUED | OUTPATIENT
Start: 2020-04-28 | End: 2020-05-05 | Stop reason: HOSPADM

## 2020-04-27 RX ORDER — TAMSULOSIN HYDROCHLORIDE 0.4 MG/1
0.4 CAPSULE ORAL AT BEDTIME
Status: DISCONTINUED | OUTPATIENT
Start: 2020-04-28 | End: 2020-05-05 | Stop reason: HOSPADM

## 2020-04-27 RX ORDER — ACETAMINOPHEN 325 MG/1
325-975 TABLET ORAL EVERY 6 HOURS PRN
Status: DISCONTINUED | OUTPATIENT
Start: 2020-04-27 | End: 2020-05-05 | Stop reason: HOSPADM

## 2020-04-27 RX ORDER — AMLODIPINE BESYLATE 5 MG/1
5 TABLET ORAL DAILY
Status: DISCONTINUED | OUTPATIENT
Start: 2020-04-28 | End: 2020-05-05 | Stop reason: HOSPADM

## 2020-04-27 RX ORDER — POLYETHYLENE GLYCOL 3350 17 G/17G
17 POWDER, FOR SOLUTION ORAL DAILY PRN
Status: DISCONTINUED | OUTPATIENT
Start: 2020-04-27 | End: 2020-05-05 | Stop reason: HOSPADM

## 2020-04-27 RX ORDER — MULTIPLE VITAMINS W/ MINERALS TAB 9MG-400MCG
1 TAB ORAL DAILY
Status: DISCONTINUED | OUTPATIENT
Start: 2020-04-28 | End: 2020-05-05 | Stop reason: HOSPADM

## 2020-04-27 RX ADMIN — MELATONIN TAB 3 MG 3 MG: 3 TAB at 20:45

## 2020-04-27 RX ADMIN — ENOXAPARIN SODIUM 30 MG: 30 INJECTION SUBCUTANEOUS at 20:45

## 2020-04-27 ASSESSMENT — ACTIVITIES OF DAILY LIVING (ADL)
COGNITION: 0 - NO COGNITION ISSUES REPORTED
RETIRED_COMMUNICATION: 0-->UNDERSTANDS/COMMUNICATES WITHOUT DIFFICULTY
SWALLOWING: 0-->SWALLOWS FOODS/LIQUIDS WITHOUT DIFFICULTY
WHICH_OF_THE_ABOVE_FUNCTIONAL_RISKS_HAD_A_RECENT_ONSET_OR_CHANGE?: AMBULATION;TRANSFERRING;TOILETING;BATHING;DRESSING;EATING;COGNITION
FALL_HISTORY_WITHIN_LAST_SIX_MONTHS: YES
AMBULATION: 0-->INDEPENDENT
BATHING: 0-->INDEPENDENT
NUMBER_OF_TIMES_PATIENT_HAS_FALLEN_WITHIN_LAST_SIX_MONTHS: 1
TOILETING: 0-->INDEPENDENT
DRESS: 0-->INDEPENDENT
TRANSFERRING: 0-->INDEPENDENT
RETIRED_EATING: 0-->INDEPENDENT

## 2020-04-27 ASSESSMENT — MIFFLIN-ST. JEOR: SCORE: 1614.88

## 2020-04-27 NOTE — H&P
[facility]                HISTORY AND PHYSICAL NOTE         Patient Name: Eduardo Kemp   YOB: 1945  MRN: [id number]     Age / Sex: 75 year old male    Admit Date: 04/27/20    Reason for Admission: intense rehabilitation for debility   IGC 10.9, Other pulmonary.     History of Present Illness  Eduardo Kemp is a 75 year old male who is being admitted to the ARU on 04/27/20     He is a Right handed gentleman, who was independent prior to this admit, who initially presented to [facility] on 3/25/20 with fever and cough. He has PMH of Hypertension and hyperlipidemia. he had just returned from [place] from a trip. He tells me that he and his wife were together for a long drive on 3/19/20.   On presentation, he was noted to be in respiratory distress secondary to ARDS and pneumonia, and was intubated. He was transferred to [facility] for Remdisivir treatment.   Eventually transferred to [place] on 4/4/2020 for ongoing management of COVID19 and was tested positive on 3/25/20.   He remained intubated gustafson about 2 weeks. He is on oxygen via nasal cannula for saturation of 92%.    Following extubation, he reports significant weakness, and fatigue. He did experience ICU delirium/encepjalopathy. He states he still notes short term memory concerns. He was on Lovenox for DVT prophylaxis and changed to xerolta for 30 days prior to discharge, per discharge summary.   Continues to be on HCTZ for blood pressure control.       Functionally, the patient was independent with basic mobility and basic ADL's prior to admit.     Currently, the patient is medically appropriate and is assessed to have needs and will benefit from an inpatient acute rehabilitation comprehensive program working with Physical and Occupational Therapist and will benefit from supervision and management of Rehab Nursing and Rehab MD.     Allergies  Allergies   Allergen  "Reactions     Ace Inhibitors      Possible angioedema 7/15.       Past Medical and Surgical History  HTN   HL   BPH     Social History  The patient lives with his wife, who also had COVID19 infection, they live in [place]. The house is a [address], with 3 NEAL and lower level which he does not need to access.   He reports that his wife is in good health.     The patient was previously independent with ambulation without use of cane or walker, previously independent with upper and lower body self care, ADLs, and IADLs.         Review of Systems  10 point ROS neg other than the symptoms noted above in the HPI and below:  Total of ten systems reviewed, pertinent positives and negatives as follows  Denies chest pain fever chills rigors  Reports shortness of breath   Denies any nausea or vomiting   Appetite good  Denies any lightheadedness or dizziness   Reports generalized weakness   On regular diet with thins  Denies any pain   Denies any new rashes   Mood euphoric   LBM today  Voiding without any problems, no incontinence   Slept well last night   Remainder of the review of the systems was negative        Physical Examination  /88   Pulse 108   Temp 98.1  F (36.7  C)   Resp 20   Ht 1.727 m (5' 8\")   Wt 90.5 kg (199 lb 9.6 oz)   BMI 30.35 kg/m    General Sitting at the edge of bed   He is awake, alert, not in distress  He is on oxygen via nasal cannula on 4L of oxygen  Generalized atrophy noted   He has functional comprehension and speech is clear   HEENT EOMs intact  Cardiac Regular  Respiratory Clear breath sounds  Abdomen Soft, nontender      Neurologic   Alert and oriented x 3  Speech is clear   comprehension is remarkable, some STM deficits noted   Good insight   Muscle Strength:   4/5 in upper and lower extremities proximally   He transferred from sitting at the edge of bed to standing position but did lose his balance.          Labs  Lab Results   Component Value Date    WBC 7.9 04/04/2020 "         Lab Results   Component Value Date    RBC 3.56 04/04/2020     Lab Results   Component Value Date    HGB 10.9 04/04/2020     Lab Results   Component Value Date    HCT 35.0 04/04/2020     No components found for: MCT  Lab Results   Component Value Date    MCV 98 04/04/2020     Lab Results   Component Value Date    MCH 30.6 04/04/2020     Lab Results   Component Value Date    MCHC 31.1 04/04/2020     Lab Results   Component Value Date    RDW 13.4 04/04/2020     Lab Results   Component Value Date     04/04/2020       Lab Results   Component Value Date     04/04/2020      Lab Results   Component Value Date    POTASSIUM 3.8 04/04/2020     Lab Results   Component Value Date    CHLORIDE 112 04/04/2020     Lab Results   Component Value Date    RICKY 7.9 04/04/2020     Lab Results   Component Value Date    CO2 32 04/04/2020     Lab Results   Component Value Date    BUN 31 04/04/2020     Lab Results   Component Value Date    CR 0.80 04/04/2020     Lab Results   Component Value Date     04/04/2020         Medications  Current Facility-Administered Medications   Medication     acetaminophen (TYLENOL) tablet 325-975 mg     [START ON 4/28/2020] amLODIPine (NORVASC) tablet 5 mg     enoxaparin ANTICOAGULANT (LOVENOX) injection 30 mg     [START ON 4/28/2020] furosemide (LASIX) half-tab 10 mg     melatonin tablet 3 mg     [START ON 4/28/2020] multivitamin w/minerals (THERA-VIT-M) tablet 1 tablet     polyethylene glycol (MIRALAX) Packet 17 g     psyllium (METAMUCIL/KONSYL) Packet 1 packet     QUEtiapine (SEROquel) half-tab 12.5 mg     senna-docusate (SENOKOT-S/PERICOLACE) 8.6-50 MG per tablet 1-4 tablet     [START ON 4/28/2020] tamsulosin (FLOMAX) capsule 0.4 mg     Medication Sig Dispensed Refills Start Date End Date Status   amLODIPine (NORVASC) 5 MG tablet   Take 5 mg by mouth daily.   0     Suspended   aspirin 81 mg chewable tablet   Chew 81 mg daily.   0     Suspended   calcium polycarbophiL (FIBERCON)  625 mg tablet   Take 625 mg by mouth 2 (two) times a day.   0     Suspended   docusate sodium (COLACE) 100 MG capsule   Take 100 mg by mouth 2 (two) times a day.   0     Suspended   glucosamn-msm-C-jacob-herbal 21 (GLUCOSAMINE-MSM COMPLEX) Tab   Take 1 tablet by mouth daily.   0     Suspended   hydroCHLOROthiazide (HYDRODIURIL) 12.5 MG tablet   Take 25 mg by mouth daily.   0     Suspended   multivitamin with minerals (THERA-M) 9 mg iron-400 mcg Tab tablet   Take 1 tablet by mouth daily.   0     Suspended   niacin 500 MG tablet   Take 500 mg by mouth 2 (two) times a day with meals.   0     Suspended   sildenafil (REVATIO) 20 mg tablet   Take  mg by mouth daily as needed (ED).   0     Suspended   tamsulosin (FLOMAX) 0.4 mg cap   Take 0.4 mg by mouth daily.   0     Suspended       ASSESSMENT / MANAGEMENT AND INITIATION OF PLAN OF CARE:      POST-ADMISSION EVALUATION:  I have evaluated the patient on admission to the Acute Rehab Center and compared with the preadmission screen, no significant differences are identified and remains appropriate for acute rehabilitation. See below for more details and specifics regarding this admission that supports requiring medical and therapy intensity in the acute rehab setting.      Patient will work with PT for 90 min daily to work on gait exercises, strengthening, endurance buildup, transfers with use of walker as needed.   Patient will work with OT for 90 min daily to work on upper and lower body self care, dressing, toileting, bathing, energy conservation techniques with use of ADs as needed. He will benefit from a MOCA testing     Rehab RN to administer medication, patient education on medication taking, VS monitoring, and surgical wound dressing changes and monitoring.     Medical Management:  Debility IGC 10.9, Other pulmonary, if the patient still requires pulmonary rehab  ARDS/Pneumonia in the setting of COVID19: continue oxygen for O2 Saturation of 92-94%. Consider  inhalers. Utilize the stethoscope in the room for examination.   Continue droplet precautions till tests negative   Metabolic encephalopathy/ICU delirium; he notes that mild fogginess of brain and does have STM deficits on examination.  HTN; continue HCTZ, blood pressures are within normal range.    HL; stable   BPH; continue Flomax.   DVT prophylaxis; on Lovenox   Bladder, Bowel, GI and DVT ppx   Bladder ;check PVRs ×3 straight cath for volumes more than 350 cc.  Monitor for symptoms of urinary tract infection, rehab nursing to send for a UA as needed  Bowels; place the patient on a bowel program and titrate medications as needed.  Hold the bowel program in case of loose stools.  Educated patient on Impact with fiber and fluid intake on a bowel function  DVT prophylaxis with mechanical means    Code Status full     Prognosis for medical improvement and stabilization of the medical issues for discharge to home is good.     Estimated Length of Stay: 7 days       Post Admission Physician Evaluation:     I have compared Eduardo Kemp's condition on admission to acute rehabilitation to that outlined in the preadmission screen. History and physical exam performed by me.    No significant differences are identified and the patient remains appropriate for an inpatient rehabilitation facility level of care to manage medical issues and address functional impairments due to debility/encephalopathy, IGC 10.9, Other pulmonary, if the patient still requires pulmonary rehab     Comorbid medical conditions being managed:   ARDS/Pneumonia, HTN, HL, BPH      Prior functional level: Previously independent with mobility and ADLs, although had become less active over the last several months due to progression of illness.      Present function:   CGA with transfers ad mobility      Anticipated rehabilitation course: Anticipate he  will require 7 days. Anticipate he will be discharged home with his family for support      Will benefit  from intensive rehabilitation includin minutes each of PT, OT  - seven days a week. Anticipate he will be able to tolerate the intensity of the therapies.        Rehabilitation nursing; has rehab nursing needs in the reenforcement of the strategies, taught by the therapists, and bowel and bladder management   Close management by physiatry.      Prognosis:  fair    Estimated length of stay: 7 days         Lore Saldivar MD, A   Department of Physical Medicine and Rehabilitation  [facility]    Total time spent: 70 minutes with more than half the time was spent counseling and / or coordination of care   Includes counseling / education / discussion     Lore Saldivar MD, A

## 2020-04-27 NOTE — PROGRESS NOTES
75 year old male admitted to our unit from [place] who was intubated for COVID- 19.  Arrives to unit alert and oriented x4 with 4 liters of oxygen upon arrival to unit.  Patient able to walk to bed with assistance of 1 and can make all of his needs known.  Patient is pleasant/cooperative.  Throughout course of stay at [place] was intubated and proned for extended periods.  At rest patient is at 2-3 liters and oxygen sats around 91-94% pending his activity level in bed and in the room. Patient indicates at [place] with therapy he is dropping his oxygen sats below 90 and requiring 6-10 liters of oxygen at times.  He states he can tell when his levels dip below 90.  Skin intact, IV site in left outer wrist area that is not being currently used at this time.  Regular diet, last BM yesterday without any issues, voiding either per urinal or walking to the bathroom using the toilet pending fatigue level.  At [place], patient states he was up walking in his room with therapy several times per day.  Respiratory status is the most impacted, but patient is fully aware and able to rebound sats with increase in oxygen.  Prior to this hospitalization patient was independent in all areas of cares and lives with wife.  Begin rehab plan of care for deconditioning, respiratory management with and without activity, medication management, bowel/bladder and functional activities of daily living. Therapy to begin in room tomorrow.  Patient had morning of therapy at [place].  Nursing and team to limit number of people into the room and cluster cares accordingly.  With activity patient is to be spot checked for oxygen saturations since this is when he tends to drop down in sats.  Pleasant/cooperative and ready to begin rehab plan of cares.

## 2020-04-27 NOTE — CONSULTS
" 20 1300   Living Arrangements   Lives With spouse   Living Arrangements house   Able to Return to Prior Arrangements yes   Living Arrangement Comments Two-story ranch home. 3 NEAL.    Home Safety   Patient Feels Safe Living in Home? yes   Discharge Planning   Patient/Family Anticipates Transition to home with family;home with help/services   Disposition Comments Wife in good health, also tested positive for COVID-19   Concerns to be Addressed no discharge needs identified   Plan   Plan Home with family A        Social Work: Initial Assessment with Discharge Plan    Patient Name: Eduardo Kemp  : 1945  Age: 75 year old  MRN: [id number]  Completed assessment with: Chart review and pt interview (by phone)   Admitted to ARU: Today 2020     Presenting Information   Date of SW assessment: 2020  Health Care Directive: Health Care Directive Agent (if patient not able to make decisions)  Primary Health Care Agent: Patient/self   Secondary Health Care Agent: Pt wife NOK   Living Situation: Pt and wife live in a ranch-style home in [place], [place]. Two-story (basement). Laundry on the main-level. 3 NEAL front door and 2 NEAL from garage (grab bar). All living on the main level.   Previous Functional Status: Independent with ADLs and IADLs. No use of assistive device. Manage own medications and finances. Driving.   DME available: None reported.   Patient and family understanding of hospitalization: \"I am keeping my spirits up and excited to get home\". 15/15 on BIMs.   Cultural/Language/Spiritual Considerations: Muslim-hussain, 74 y/o  male. English-speaking.       Physical Health  Reason for admission: COVID-19 virus infection     Provider Information   Primary Care Physician:Delvin Koch (PREVIOUS pcp, RECENTLY SAW NEW MD AT [facility], NEED TO CHECK WITH PT).   [facility]  : None reported     Mental Health/Chemical Dependency:   Diagnosis: \"None at " "all\"  Alcohol/Tobacco/Narcotis: hx of tobacco (quit 1980) use and occasional alcohol use   Support/Services in Place: None reported   Services Needed/Recommended: Supportive services available by consult   Sexuality/Intimacy: Not discussed     Support System  Marital Status: . Wife also tested positive for COVID-19. Pt reported that his wife is in good health. Able to assist at discharge.   Family support: 3 adult children, live local. 3 grandchildren and 1 more on the way. 3 siblings, one [place], one [place] and one [place]. Oldest brother passed away a few years ago,  in [place].   Other support available: Just moved last summer to new home, not many neighbors. Next door neighbor is EMT, don't know very well. Have been offering to help wife.     Community Resources  Current in home services: None reported   Previous services: None reported     Financial/Employment/Education  Employment Status: Retired   Income Source: Social Security   Education: Not discussed   Financial Concerns:  None reported   Insurance: Medicare and AARP       Discharge Plan   Patient and family discharge goal: Home   Provided Education on discharge plan: YES  Patient agreeable to discharge plan:  YES  Provided education and attained signature for Medicare IM and IRF Patient Rights and Privacy Information provided to patient : YES-discussed over the phone   Provided patient with [facility] Resources: N/A   Barriers to discharge: None indicated at this time     Discharge Recommendations   Disposition: Home with family assistance   Transportation Needs: Family assist   Name of Transportation Company and Phone: N/A     Additional comments   15/15 on BIMs. Interview conducted over the phone. Denied any immediate needs or concerns. Gave SW permission to contact his wife in the next 1-2 days to introduce self. Discharge needs pending.     Please invite to Care Conference:  Pt spouse       Vita Brown, " [facility] Acute [facility]   Phone: [phone]  I   Pager: [phone]

## 2020-04-28 ENCOUNTER — APPOINTMENT (OUTPATIENT)
Dept: OCCUPATIONAL THERAPY | Facility: CLINIC | Age: 75
End: 2020-04-28
Payer: MEDICARE

## 2020-04-28 ENCOUNTER — APPOINTMENT (OUTPATIENT)
Dept: PHYSICAL THERAPY | Facility: CLINIC | Age: 75
End: 2020-04-28
Payer: MEDICARE

## 2020-04-28 ENCOUNTER — AMBULATORY - HEALTHEAST (OUTPATIENT)
Dept: CARE COORDINATION | Facility: CLINIC | Age: 75
End: 2020-04-28

## 2020-04-28 DIAGNOSIS — U07.1 COVID-19 VIRUS INFECTION: ICD-10-CM

## 2020-04-28 LAB
ALBUMIN SERPL-MCNC: 2.8 G/DL (ref 3.4–5)
ALP SERPL-CCNC: 100 U/L (ref 40–150)
ALT SERPL W P-5'-P-CCNC: 50 U/L (ref 0–70)
ANION GAP SERPL CALCULATED.3IONS-SCNC: 7 MMOL/L (ref 3–14)
AST SERPL W P-5'-P-CCNC: 34 U/L (ref 0–45)
BASOPHILS # BLD AUTO: 0 10E9/L (ref 0–0.2)
BASOPHILS NFR BLD AUTO: 0.5 %
BILIRUB DIRECT SERPL-MCNC: <0.1 MG/DL (ref 0–0.2)
BILIRUB SERPL-MCNC: 0.4 MG/DL (ref 0.2–1.3)
BUN SERPL-MCNC: 9 MG/DL (ref 7–30)
CALCIUM SERPL-MCNC: 9.4 MG/DL (ref 8.5–10.1)
CHLORIDE SERPL-SCNC: 102 MMOL/L (ref 94–109)
CO2 SERPL-SCNC: 27 MMOL/L (ref 20–32)
CREAT SERPL-MCNC: 0.74 MG/DL (ref 0.66–1.25)
DIFFERENTIAL METHOD BLD: ABNORMAL
EOSINOPHIL # BLD AUTO: 0.3 10E9/L (ref 0–0.7)
EOSINOPHIL NFR BLD AUTO: 3.6 %
ERYTHROCYTE [DISTWIDTH] IN BLOOD BY AUTOMATED COUNT: 13.9 % (ref 10–15)
GFR SERPL CREATININE-BSD FRML MDRD: 90 ML/MIN/{1.73_M2}
GLUCOSE SERPL-MCNC: 102 MG/DL (ref 70–99)
HCT VFR BLD AUTO: 38.4 % (ref 40–53)
HGB BLD-MCNC: 12.7 G/DL (ref 13.3–17.7)
IMM GRANULOCYTES # BLD: 0 10E9/L (ref 0–0.4)
IMM GRANULOCYTES NFR BLD: 0.4 %
LYMPHOCYTES # BLD AUTO: 2.3 10E9/L (ref 0.8–5.3)
LYMPHOCYTES NFR BLD AUTO: 31.1 %
MCH RBC QN AUTO: 30.5 PG (ref 26.5–33)
MCHC RBC AUTO-ENTMCNC: 33.1 G/DL (ref 31.5–36.5)
MCV RBC AUTO: 92 FL (ref 78–100)
MONOCYTES # BLD AUTO: 0.5 10E9/L (ref 0–1.3)
MONOCYTES NFR BLD AUTO: 6.7 %
NEUTROPHILS # BLD AUTO: 4.3 10E9/L (ref 1.6–8.3)
NEUTROPHILS NFR BLD AUTO: 57.7 %
NRBC # BLD AUTO: 0 10*3/UL
NRBC BLD AUTO-RTO: 0 /100
PLATELET # BLD AUTO: 198 10E9/L (ref 150–450)
POTASSIUM SERPL-SCNC: 3.7 MMOL/L (ref 3.4–5.3)
PROT SERPL-MCNC: 7.4 G/DL (ref 6.8–8.8)
RBC # BLD AUTO: 4.16 10E12/L (ref 4.4–5.9)
SARS-COV-2 PCR COMMENT: NORMAL
SARS-COV-2 RNA SPEC QL NAA+PROBE: NEGATIVE
SARS-COV-2 RNA SPEC QL NAA+PROBE: NORMAL
SODIUM SERPL-SCNC: 136 MMOL/L (ref 133–144)
SPECIMEN SOURCE: NORMAL
SPECIMEN SOURCE: NORMAL
WBC # BLD AUTO: 7.5 10E9/L (ref 4–11)

## 2020-04-28 PROCEDURE — 80048 BASIC METABOLIC PNL TOTAL CA: CPT | Performed by: PHYSICAL MEDICINE & REHABILITATION

## 2020-04-28 PROCEDURE — 25000128 H RX IP 250 OP 636: Performed by: PHYSICAL MEDICINE & REHABILITATION

## 2020-04-28 PROCEDURE — 12800006 ZZH R&B REHAB

## 2020-04-28 PROCEDURE — 36415 COLL VENOUS BLD VENIPUNCTURE: CPT | Performed by: PHYSICAL MEDICINE & REHABILITATION

## 2020-04-28 PROCEDURE — 97530 THERAPEUTIC ACTIVITIES: CPT | Mod: GP

## 2020-04-28 PROCEDURE — 25000132 ZZH RX MED GY IP 250 OP 250 PS 637: Mod: GY | Performed by: PHYSICIAN ASSISTANT

## 2020-04-28 PROCEDURE — 97116 GAIT TRAINING THERAPY: CPT | Mod: GP

## 2020-04-28 PROCEDURE — 97166 OT EVAL MOD COMPLEX 45 MIN: CPT | Mod: GO | Performed by: OCCUPATIONAL THERAPIST

## 2020-04-28 PROCEDURE — 97161 PT EVAL LOW COMPLEX 20 MIN: CPT | Mod: GP

## 2020-04-28 PROCEDURE — 25000132 ZZH RX MED GY IP 250 OP 250 PS 637: Mod: GY | Performed by: PHYSICAL MEDICINE & REHABILITATION

## 2020-04-28 PROCEDURE — 87635 SARS-COV-2 COVID-19 AMP PRB: CPT | Performed by: STUDENT IN AN ORGANIZED HEALTH CARE EDUCATION/TRAINING PROGRAM

## 2020-04-28 PROCEDURE — 97110 THERAPEUTIC EXERCISES: CPT | Mod: GO | Performed by: OCCUPATIONAL THERAPIST

## 2020-04-28 PROCEDURE — 97530 THERAPEUTIC ACTIVITIES: CPT | Mod: GO | Performed by: OCCUPATIONAL THERAPIST

## 2020-04-28 PROCEDURE — 80076 HEPATIC FUNCTION PANEL: CPT | Performed by: PHYSICAL MEDICINE & REHABILITATION

## 2020-04-28 PROCEDURE — 97535 SELF CARE MNGMENT TRAINING: CPT | Mod: GO | Performed by: OCCUPATIONAL THERAPIST

## 2020-04-28 PROCEDURE — 85025 COMPLETE CBC W/AUTO DIFF WBC: CPT | Performed by: PHYSICAL MEDICINE & REHABILITATION

## 2020-04-28 RX ADMIN — ENOXAPARIN SODIUM 30 MG: 30 INJECTION SUBCUTANEOUS at 08:09

## 2020-04-28 RX ADMIN — MULTIPLE VITAMINS W/ MINERALS TAB 1 TABLET: TAB at 08:09

## 2020-04-28 RX ADMIN — HYDROCHLOROTHIAZIDE 12.5 MG: 12.5 CAPSULE ORAL at 08:09

## 2020-04-28 RX ADMIN — AMLODIPINE BESYLATE 5 MG: 5 TABLET ORAL at 08:09

## 2020-04-28 RX ADMIN — TAMSULOSIN HYDROCHLORIDE 0.4 MG: 0.4 CAPSULE ORAL at 20:52

## 2020-04-28 RX ADMIN — ENOXAPARIN SODIUM 30 MG: 30 INJECTION SUBCUTANEOUS at 20:52

## 2020-04-28 ASSESSMENT — ACTIVITIES OF DAILY LIVING (ADL): PREVIOUS_RESPONSIBILITIES: MEAL PREP;HOUSEKEEPING;LAUNDRY;SHOPPING;YARDWORK;MEDICATION MANAGEMENT;FINANCES;DRIVING

## 2020-04-28 NOTE — PROGRESS NOTES
04/28/20 1100   Quick Adds   Type of Visit Initial PT Evaluation   Living Environment   Lives With spouse   Living Arrangements house   Home Accessibility stairs to enter home   Number of Stairs, Main Entrance 1  (1 step from garage with R assist bar; 3 NEAL front door R reece)   Stair Railings, Main Entrance railing on right side (ascending)   Transportation Anticipated family or friend will provide;car, drives self  (pt owns [employer])   Living Environment Comment PT: bathroom - walk in shower with seat, standard height toilet, no GB. Bedroom - regular bed, no moving features. Pt has laundry on main floor.   Self-Care   Usual Activity Tolerance good   Current Activity Tolerance poor   Regular Exercise Yes   Activity/Exercise Type walking   Exercise Amount/Frequency daily   Equipment Currently Used at Home none   Activity/Exercise/Self-Care Comment PT: stays active with household project, walks 2 miles either outside or on treadmill. Travels to [place] every winter.   Functional Level Prior   Ambulation 0-->independent   Transferring 0-->independent   Toileting 0-->independent   Bathing 0-->independent   Communication 0-->understands/communicates without difficulty   Swallowing 0-->swallows foods/liquids without difficulty   Cognition 0 - no cognition issues reported   Fall history within last six months yes   Number of times patient has fallen within last six months 1   Which of the above functional risks had a recent onset or change? ambulation;transferring;toileting;bathing;dressing   Prior Functional Level Comment PT: independent with all mobility PTA   General Information   Onset of Illness/Injury or Date of Surgery - Date 04/27/20  (admit to ARU)   Referring Physician Rosie   Patient/Family Goals Statement To return home safely ASAP, prepare for home projects in the future.   Pertinent History of Current Problem (include personal factors and/or comorbidities that impact the POC) [facility]  2/2 COVID-19, HTN, HLD   Precautions/Limitations other (see comments)  (droplet isolation)   Heart Disease Risk Factors High blood pressure;Dislipidemia   General Observations Pt received sup in bed, HOB raised, L dorsal hand PIV.   Cognitive Status Examination   Orientation orientation to person, place and time   Level of Consciousness alert   Follows Commands and Answers Questions 100% of the time   Personal Safety and Judgment intact   Memory intact   Pain Assessment   Patient Currently in Pain No   Posture    Posture Forward head position;Protracted shoulders   Strength   Manual Muscle Testing Quick Adds MMT: Shoulder;MMT: Elbow/Forearm;MMT: Wrist;MMT: Hand (General);MMT: Hip;MMT: Knee;MMT: Ankle   MMT: Shoulder   Shoulder Flexion - Left Side (5/5) normal,left   Shoulder ABduction - Left Side (5/5) normal,left   Shoulder Flexion - Right Side (5/5) normal,right   Shoulder ABduction - Right Side (5/5) normal,right   MMT: Elbow/Forearm, Rehab Eval   Elbow Flexion - Left Side (5/5) normal,left   Elbow Extension - Left Side (5/5) normal,left   Elbow Flexion - Right Side (5/5) normal,right   Elbow Extension - Right Side (5/5) normal,right   MMT: Hand   Hand Muscle Testing Results : 4/5 B   MMT: Wrist   Wrist Muscle Testing Results AROM intact BUE   MMT: Hip, Rehab Eval   Hip Flexion - Left Side (4/5) good, left   Hip Extension - Left Side (5/5) normal,left   Hip ABduction - Left Side (5/5) normal,left   Hip ADduction - Left Side (5/5) normal,left   Hip Flexion - Right Side (4/5) good, right   Hip Extension - Right Side (5/5) normal,right   Hip ABduction - Right Side (5/5) normal,right   Hip ADduction - Right Side (5/5) normal,right   MMT: Knee, Rehab Eval   Knee Flexion - Left Side (5/5) normal,left   Knee Extension - Left Side (5/5) normal,left   Knee Flexion - Right Side (5/5) normal,right   Knee Extension - Right Side (5/5) normal,right   MMT: Ankle, Rehab Eval   Ankle Dorsiflexion - Left Side 5/5) normal,left    Ankle Plantarflexion - Left Side 5/5) normal,left   Ankle Dorsiflexion - Right Side 5/5) normal,left   Ankle Plantarflexion - Right Side 5/5) normal,left   ARC Assessment Only   Acute Rehab Functional Assessment See IP Rehab Daily Documentation Flowsheet for Functional Mobility/ADL Assessment   Balance   Balance other (describe)   Sitting Balance: Static good balance   Sitting Balance: Dynamic good balance   Sit-to-Stand Balance good balance   Standing Balance: Static good balance   Standing Balance: Dynamic good balance   Balance Quick Add Sitting balance: Static;Sitting balance: dynamic;Sit to stand balance;Standing balance: static;Standing balance: dynamic;Systems impairment contributing to balance disturbance;Identified impairments contributing to balance disturbance   Sensory Examination   Sensory Perception other (describe)   Sensory Perception Quick Adds Light touch   Sensation Light Touch   LUE w/i normal limits   LLE w/i normal limits   RUE w/i normal limits   RLE w/i normal limits   General Therapy Interventions   Planned Therapy Interventions balance training;bed mobility training;gait training;manual therapy;neuromuscular re-education;ROM;strengthening;stretching;transfer training;risk factor education;home program guidelines;progressive activity/exercise   Clinical Impression   Criteria for Skilled Therapeutic Intervention yes, treatment indicated   PT Diagnosis Deconditioning 2/2 AHRF and COVID-19   Influenced by the following impairments Strength, endurance   Functional limitations due to impairments Gait, stairs, functional endurance   Clinical Presentation Evolving/Changing   Clinical Presentation Rationale PT: minimal comorbidities, fairly active lifestyle prior, good home setup and assist available from wife prn   Clinical Decision Making (Complexity) Low complexity   Therapy Frequency Daily  (90 min)   Predicted Duration of Therapy Intervention (days/wks) 7 days   Anticipated Equipment Needs at  Discharge front wheeled walker   Anticipated Discharge Disposition Home with Assist;Home with Home Therapy   Risk & Benefits of therapy have been explained Yes   Patient, Family & other staff in agreement with plan of care Yes   Clinical Impression Comments PT: see care plan note   Total Evaluation Time   Total Evaluation Time (Minutes) 45

## 2020-04-28 NOTE — PLAN OF CARE
"Seated Step Test:  Setup: Patient seated in a chair with a backrest. Metronome set for 60 beats/min using metronome on computer. Appropriately sized step placed in front of patient depending on the stage.    Stage 1 - 6\" block  Stage 2 - 12\" block  Stage 3 - 18\" block  Stage 4 - 18\" block + contralateral arm elevation with steps    Instructions: Patient performs alternating toe taps while seated for 2 minutes in time with the metronome, starting with stage 1 and repeating with each successive stage. The test is completed when the patient reaches one of these conditions:    1) 75% of max heart rate (see below for calculating using Karvonen method)  2) Experiences significant SOB  3) Vitals exceed the expected norms for the patient.     Calculations:  Max Heart Rate = 220 - Age    Heart Rate Reserve (Karvonen method) = 0.75(Max HR - Resting HR) + Resting HR    Calculated Maximum Heart Rate (220 - Age) = 145  Patient's Resting Heart Rate = 71  Heart Rate Reserve = 127    Results from Test:   Supplemental O2 - 5 LPM  Max HR - 105 bpm  Max desat - 90% on 5 LPM  Last completed stage - stage 3        "

## 2020-04-28 NOTE — PLAN OF CARE
FOCUS/GOAL  Medical management    ASSESSMENT, INTERVENTIONS AND CONTINUING PLAN FOR GOAL:  Pt is alert and oriented. No complaints of pain. SBA with walker, but did not get out of bed this shift. Continent of bladder using urinal independently. RN emptying urinal on rounds. VSS. No fever. Pt on O2 NC 3 L and sats are 94-96%. L PIV saline locked and WDL. Special precautions in place with RN sole care provider overnight. Clustering cares as able. Appeared to sleep well overnight.

## 2020-04-28 NOTE — PROGRESS NOTES
CLINICAL NUTRITION SERVICES - ASSESSMENT NOTE     Nutrition Prescription    RECOMMENDATIONS FOR MDs/PROVIDERS TO ORDER:  None today     Malnutrition Status:    Unable to fully determine due to no NFPA     Recommendations already ordered by Registered Dietitian (RD):  RD will add to diet orders to offer pt Boost Plus at meals - pt can accept/decline at any time    Future/Additional Recommendations:  Monitor meal intakes  Monitor supplement acceptance  Monitor weight trends      REASON FOR ASSESSMENT  Eduardo Kemp is a/an 75 year old male assessed by the dietitian for Admission Nutrition Risk Screen for reduced oral intake over the last month    NUTRITION/MEDICAL HISTORY  Per chart review: Pt admitted to ARU for rehabilitation in the setting of prolonged hospitalization due to ARDS secondary to COVID-19 and pneumonia. Past medical history of HTL and HLD noted.     No patient visit in light of reduced in-person exposure due to COVID(+). Issues with phone connection today, unable to connect with pt. Significant weight loss noted, will plan to offer oral supplements at meal times.     Addendum 4/29/20: RD able to connect with pt on the phone today. Pt reports improving appetite, taking meals well and able to call in for meals. Reviewed significant wt loss, pt agreeing reporting UBW of 225-230 lbs prior to hospitalization. RD reviewed orders for supplements, encouraged pt to take as needed to maintain adequate nutritional intakes for continued therapy.     CURRENT NUTRITION ORDERS  Diet: Regular  Intake/Tolerance: 100% per flow sheets     LABS  Results for EDUARDO KEMP (MRN [id number]) as of 4/28/2020 10:55   3/31/2020 04:02 4/1/2020 04:09 4/4/2020 04:06 4/27/2020 15:53   Sodium 142 142 145 (H)    Potassium 3.5 4.0 3.8    Urea Nitrogen 28 29 31 (H)    Creatinine 0.73 0.84 0.80 0.76   Calcium 8.2 (L) 8.3 (L) 7.9 (L)    Magnesium   2.6 (H)    Phosphorus   2.9    Albumin 1.6 (L)      Protein Total 5.9 (L)      Bilirubin  "Total 0.4      Alkaline Phosphatase 57      ALT 45      AST 56 (H)      CRP Inflammation  200.0 (H) 160.0 (H)      MEDICATIONS  Seroquel, Hydrochlorothiazide, MVI    ANTHROPOMETRICS  Height: 172.7 cm (5' 8\")  Most Recent Weight: 90.5 kg (199 lb 9.6 oz)    IBW: 70 kg  BMI: Obesity Grade I BMI 30-34.9  Weight History:   Wt Readings from Last 10 Encounters:   04/27/20 90.5 kg (199 lb 9.6 oz)   04/04/20 106.5 kg (234 lb 12.6 oz)     108.1 kg (238 lb 5.1 oz) 03/25/2020     Weight assessment: Significant 16.3% weight loss in 1 month     Dosing Weight: 90.5 kg     ASSESSED NUTRITION NEEDS  Estimated Energy Needs: 2260+ kcals/day (25 kcals/kg)  Justification: Significant weight loss during acute hospitalization   Estimated Protein Needs: 90+ grams protein/day (1 grams of pro/kg)  Justification: Significant weight loss during acute hospitalization   Estimated Fluid Needs: 2260 mL/day (25 mL/kg)   Justification: Maintenance    MALNUTRITION  % Intake: Intake does not meet criteria  % Weight Loss: > 5% in 1 month (severe)  Subcutaneous Fat Loss: Unable to assess  Muscle Loss: Unable to assess  Fluid Accumulation/Edema: None noted  Malnutrition Diagnosis: Unable to fully determine due to no NFPA     NUTRITION DIAGNOSIS  Predicted inadequate nutrient intake vs predicted increased nutrient needs related to likely hypercatabolism with previous critical illness    INTERVENTIONS  Implementation  Offer Medical food supplement therapy     Goals  Patient to consume % of nutritionally adequate meal trays TID, or the equivalent with supplements/snacks.     Monitoring/Evaluation  Progress toward goals will be monitored and evaluated per protocol.  "

## 2020-04-28 NOTE — PLAN OF CARE
Eduardo will need 7 days in order to return home with intermittent support and HH PT. He will likely discharge home on supplemental O2. Will also need to see how his strength and endurance progresses over the next few days to determine if 7 days appropriate for discharge home ambulatory.    Mobility Status: SBA amb in room with FWW.    Primary Barriers: significant cardiopulmonary/endurance deficits    DME Needs: FWW

## 2020-04-28 NOTE — PLAN OF CARE
FOCUS/GOAL  Bowel Management, Bladder Management, Safety Management, Medical Management    ASSESSMENT, INTERVENTIONS AND CONTINUING PLAN FOR GOAL:  Alert and oriented x4, continent of B/B, stand-by assist of one for transfers. VSS except o2 sats. O2 sats above 90% on 3LPM at rest, but sats drop quickly with activity. Pt requiring up to 7-8 LPM during activity then sats rise quickly. Lung sounds OK, posterior LL sounds diminished. Pt reminded to do incentive spirometry every 2 hours. Pt also retested for Covid this morning, results pending. Denies pain, and N/T. Dyspnea on exertion and SOB during activity. Pt uses call light appropriately, no other concerns at this time.

## 2020-04-28 NOTE — PHARMACY-MEDICATION REGIMEN REVIEW
Pharmacy Medication Regimen Review  Eduardo Kemp is a 75 year old male who is currently in the Acute Rehab Unit.    Assessment: Upon review of the medications and patient chart the following irregularities were found:     Psychotropic medications without adequate indications/durations: Pt has Seroquel prn ordered for sleep/delirium.     Other Recommendations: Hydrochlorothiazide needs hold parameters.     Plan:   1. Recommend to discontinue prn Seroquel as able.  2. Recommend to put BP hold parameters on hydrochlorothiazide.     Attending provider will be sent this note for review.  If there are any emergent issues noted above, pharmacist will contact provider directly by phone.      Pharmacy will periodically review the resident's medication regimen for any PRN medications not administered in > 72 hours and discontinue them. The pharmacist will discuss gradual dose reductions of psychopharmacologic medications with interdisciplinary team on a regular basis.    Please contact pharmacy if the above does not answer specific medication questions/concerns.    Background:  A pharmacist has reviewed all medications and pertinent medical history today.  Medications were reviewed for appropriate use and any irregularities found are listed with recommendations.      Current Facility-Administered Medications:      acetaminophen (TYLENOL) tablet 325-975 mg, 325-975 mg, Oral, Q6H PRN, Nathan Velez DO     amLODIPine (NORVASC) tablet 5 mg, 5 mg, Oral, Daily, Nathan Velez DO, 5 mg at 04/28/20 0809     enoxaparin ANTICOAGULANT (LOVENOX) injection 30 mg, 30 mg, Subcutaneous, Q12H, Nathan Velez DO, 30 mg at 04/28/20 0809     hydrochlorothiazide (MICROZIDE) capsule 12.5 mg, 12.5 mg, Oral, Daily, Idalia Lopez PA, 12.5 mg at 04/28/20 0809     melatonin tablet 3 mg, 3 mg, Oral, At Bedtime PRN, Nathan Velez DO, 3 mg at 04/27/20 2045     multivitamin w/minerals (THERA-VIT-M) tablet 1 tablet, 1  tablet, Oral, Daily, Nathan Velez DO, 1 tablet at 04/28/20 0809     polyethylene glycol (MIRALAX) Packet 17 g, 17 g, Oral, Daily PRN, Nathan Velez DO     psyllium (METAMUCIL/KONSYL) Packet 1 packet, 1 packet, Oral, Daily PRN, Nathan Velez DO     QUEtiapine (SEROquel) half-tab 12.5 mg, 12.5 mg, Oral, At Bedtime PRN, Idalia Lopez PA     senna-docusate (SENOKOT-S/PERICOLACE) 8.6-50 MG per tablet 1-4 tablet, 1-4 tablet, Oral, BID PRN, Nathan Velez DO     sodium chloride (PF) 0.9% PF flush 3 mL, 3 mL, Intracatheter, q1 min prn, Nathan Velez DO     sodium chloride (PF) 0.9% PF flush 3 mL, 3 mL, Intracatheter, Q8H, Nathan Velez DO, 3 mL at 04/28/20 0418     tamsulosin (FLOMAX) capsule 0.4 mg, 0.4 mg, Oral, At Bedtime, Nathan Velez DO  No current outpatient prescriptions on file.   PMH:  ARDS secondary to COVID-19 and pneumonia  BPH  HTN

## 2020-04-28 NOTE — PLAN OF CARE
Patient is Alert and Oriented x4, able to call to make needs known. Calm and pleasant for all cares. Denies pain. VSS throughout shift. On 3LPM O2 via NC, sating around 94-95%. Voiding using urinal at bedside. LBM was today. PIV to L. Forearm patent, saline locked. Declined shower or bedside wash up, and oral cares at HS. PRN melatonin given at HS. COVID/ droplet precautions maintained throughout shift. Continue with POC.

## 2020-04-28 NOTE — PROGRESS NOTES
"  [facility]   Acute Rehabilitation Unit  Daily progress note    INTERVAL HISTORY  Eduardo Kemp was seen and examined at bedside.     No acute events overnight; he slept well.  He feels like he is getting stronger and has been ambulating around the bed. After a few minutes of walking, he feels some shortness of breathing and coughing that quickly resolves. He feels no shortness of breath at rest when using the nasal cannula.  He denies headache, fever, chills, stomach upset, pleuritic pain, calf tenderness, or new weakness.    Functionally, PT noted desaturation to the high 80s during ambulatory exercise.    MEDICATIONS  Scheduled meds    amLODIPine  5 mg Oral Daily     enoxaparin ANTICOAGULANT  30 mg Subcutaneous Q12H     hydrochlorothiazide  12.5 mg Oral Daily     multivitamin w/minerals  1 tablet Oral Daily     sodium chloride (PF)  3 mL Intracatheter Q8H     tamsulosin  0.4 mg Oral At Bedtime       PRN meds:  acetaminophen, melatonin, polyethylene glycol, psyllium, senna-docusate, sodium chloride (PF)    PHYSICAL EXAM  /69   Pulse 90   Temp 97.7  F (36.5  C) (Oral)   Resp 18   Ht 1.727 m (5' 8\")   Wt 90.5 kg (199 lb 9.6 oz)   SpO2 94%   BMI 30.35 kg/m    Gen: lying in bed, no acute distress  HEENT: no conjunctival injection, normocephalic, no rhinorrhea, neck supple.  Infrequent wet cough.  Cardio: regular rate and rhythm, no murmurs  Pulm: Good air entry bilaterally.  On 5L supplemental O2. Bilateral rales, right greater than left.  No rhonchi or wheezing.  Abd: Normal active bowel sounds, nontender, soft, nondistended  Ext: No pretibial edema, warm and well perfused  Neuro/MSK:  Alert, oriented to self, place, and time, follows complex commands, speech fluent and comprehensible with no slurring or word finding difficulties. Conjugate gaze, no facial asymmetry, and 5/5 in strength across all testable muscle movements.    LABS  CBC:   Recent Labs   Lab Test " 04/28/20  1142   WBC 7.5   RBC 4.16*   HGB 12.7*   MCV 92   MCH 30.5   MCHC 33.1   RDW 13.9        BMP:   Recent Labs   Lab Test 04/28/20  1142      POTASSIUM 3.7   CHLORIDE 102   CO2 27   BUN 9   CR 0.74   *     Other labs:  Albumin 2.8  COVID-19 Virus PCR 04/28/20: Pending    ASSESSMENT AND PLAN    Eduardo Kemp is a(n) 75 year old yo male with a past medical history of HTN, HLD, and BPH who was previously admitted for SARS-CoV-2 positive pneumonia.  His date of symptom onset is estimated to be 03/25/20 with fever and cough, which is also when he was confirmed positive with testing.  He recently traveled from [place] and his wife contracted a milder case of SARS-CoV-2.  His hospital course was complicated by a prolonged ICU stay for ARDS with intubation for 2 weeks with a secondary bacterial pneumonia.  He has post-ICU weakness and dyspnea and O2 desaturation on mild exertion.  He continued to stabilize and was admitted to the ARU on 4/27/20.    Rehab  #Deconditioning  He endorsed global weakness on admission.  His goal is to achieve modified independence at home by discharge.  - ADL: OT  - Mobility: PT  - Adjustment to disability: Clinical Psych  - Neurocognitive testing to screen for cognitive / speech issues, consult speech if suspicion for need for SLP    #Sleep  #Metabolic Encephalopathy (resolved)  His previous hospital course was complicated by ICU related delirium requiring seroquel.  He has not required seroquel since admission and has slept well without the use of medication.  - discontinued seroquel  - melatonin 3mg prn    #Bowel  - prn bowel medications  - prn fiber    #Bladder  - PTA flomax 0.4 daily  - monitor    Pulm  #SARS-CoV-2 Pneumonia  #Hopsital Acquired Pneumonia  He completed a Remdesivir trial (3/25-4/3) and required proning during weaning of ventilation.  D-dimer was elevated on 4/4.  He was treated for secondary HAP with one week of zosyn and azithromycin.  COVID  testing on 4/22 was positive, but overall his pulmonary and associated clinical status improved to discharge.  He completed a 30 day course of post-covid anticoagulation.  No current signs of PE.  - Repeat COVID testing today, follow  - Repeat COVID testing 24 hours after today's  - 5L Supplemental O2 with goal above 92, less than 96  - Medicine is following, appreciate recs  - If he develops wheezing, consider albuterol MDI  - Droplet precautions as long as he is COVID positive and has respiratory symptoms  - consider repeat D-dimer    Cardiac  #Hypertension  - Discontinued lasix per medicine  - Hydrochlorothiazide 12.5mg daily  - amlodipine 5mg daily    #Hyperlipidemia: stable    FEN/GI  #Diet: Regular, thin liquids    Ppx  #GI: none  #DVT: Enoxaparin    Dispo  Code: Full code (verified with patient)  Disposition: To home with family  ELOS: 7 days from 4/27  Rehab Prognosis: Good  #Follow up:  - Primary care provider in 1-2 weeks, likely telemed.  Consider close follow up for any decompensation at home.  - PM&R in 4 months    Chris Hernandes MD  Physical Medicine & Rehabilitation    Staffed with Dr. Velez.

## 2020-04-29 ENCOUNTER — APPOINTMENT (OUTPATIENT)
Dept: OCCUPATIONAL THERAPY | Facility: CLINIC | Age: 75
End: 2020-04-29
Payer: MEDICARE

## 2020-04-29 ENCOUNTER — APPOINTMENT (OUTPATIENT)
Dept: PHYSICAL THERAPY | Facility: CLINIC | Age: 75
End: 2020-04-29
Payer: MEDICARE

## 2020-04-29 LAB
SARS-COV-2 PCR COMMENT: NORMAL
SARS-COV-2 RNA SPEC QL NAA+PROBE: NEGATIVE
SARS-COV-2 RNA SPEC QL NAA+PROBE: NORMAL
SPECIMEN SOURCE: NORMAL
SPECIMEN SOURCE: NORMAL

## 2020-04-29 PROCEDURE — 25000132 ZZH RX MED GY IP 250 OP 250 PS 637: Mod: GY | Performed by: PHYSICIAN ASSISTANT

## 2020-04-29 PROCEDURE — 25000128 H RX IP 250 OP 636: Performed by: PHYSICAL MEDICINE & REHABILITATION

## 2020-04-29 PROCEDURE — 87635 SARS-COV-2 COVID-19 AMP PRB: CPT | Performed by: STUDENT IN AN ORGANIZED HEALTH CARE EDUCATION/TRAINING PROGRAM

## 2020-04-29 PROCEDURE — 12800006 ZZH R&B REHAB

## 2020-04-29 PROCEDURE — 25000132 ZZH RX MED GY IP 250 OP 250 PS 637: Mod: GY | Performed by: PHYSICAL MEDICINE & REHABILITATION

## 2020-04-29 PROCEDURE — 97110 THERAPEUTIC EXERCISES: CPT | Mod: GP

## 2020-04-29 PROCEDURE — 97110 THERAPEUTIC EXERCISES: CPT | Mod: GO | Performed by: OCCUPATIONAL THERAPIST

## 2020-04-29 PROCEDURE — 97535 SELF CARE MNGMENT TRAINING: CPT | Mod: GO | Performed by: OCCUPATIONAL THERAPIST

## 2020-04-29 RX ADMIN — ENOXAPARIN SODIUM 30 MG: 30 INJECTION SUBCUTANEOUS at 09:36

## 2020-04-29 RX ADMIN — MULTIPLE VITAMINS W/ MINERALS TAB 1 TABLET: TAB at 09:36

## 2020-04-29 RX ADMIN — ENOXAPARIN SODIUM 30 MG: 30 INJECTION SUBCUTANEOUS at 21:48

## 2020-04-29 RX ADMIN — MELATONIN TAB 3 MG 3 MG: 3 TAB at 21:48

## 2020-04-29 RX ADMIN — TAMSULOSIN HYDROCHLORIDE 0.4 MG: 0.4 CAPSULE ORAL at 21:48

## 2020-04-29 RX ADMIN — HYDROCHLOROTHIAZIDE 12.5 MG: 12.5 CAPSULE ORAL at 09:36

## 2020-04-29 NOTE — PLAN OF CARE
PT: tolerating in room mobility without assistive device - 5LPM-7LPM O2 to maintain SpO2 >87%. Able to maintain appropriate saturations during seated biking tasks. Continue to educate on energy conservation strategies and breathing mechanics.

## 2020-04-29 NOTE — PLAN OF CARE
FOCUS/GOAL  Bowel management, Bladder management, Pain management and Cognition/Memory/Judgment/Problem solving    ASSESSMENT, INTERVENTIONS AND CONTINUING PLAN FOR GOAL:  Pt is alert and oriented, able to communicate and call for needs, denied sob, fever, chills, nausea, chest pain,  numbness or tingling, continent of bowel and bladder, using the urinal at the bedside overnight, on 3 LPM O2 overnight via NC, skin intact, no further issues at this time continue with POC.

## 2020-04-29 NOTE — PLAN OF CARE
Discharge Planner OT   Patient plan for discharge: Home with HC  Current status: SBA mobility and transfers using fww. SBA-Min A ADLs. Pt requiring 5L O2 NC or greater during activity.  Barriers to return to prior living situation: Deconditioning, significant change in vitals signs with activity, self pacing.  Recommendations for discharge: Anticipate 7-10 days to address goals in POC.        Entered by: Sheron Davison 04/29/2020 6:27 AM

## 2020-04-29 NOTE — PLAN OF CARE
FOCUS/GOAL  Nutrition/Feeding/Swallowing precautions, Medical management, and Mobility    ASSESSMENT, INTERVENTIONS AND CONTINUING PLAN FOR GOAL:  Alert and oriented x4, continent B/B, LBM 4/28/2020. O2 sats stable at rest on 3LPM; increase o2 with activity. Pt has good appetite, ate 100% of both meals. Pt retested for Covid this AM, pt tested negative. VSS, denied pain and SOB. Infrequent, nonproductive cough. Continue POC.

## 2020-04-29 NOTE — CONSULTS
RE: Eduardo Kemp  MR#: [id number]  : 1945  DOS: 2020  CHI:  2020    [place] [address]  [phone]    NEUROPSYCHOLOGICAL INPATIENT CONSULTATION      REASON FOR REFERRAL:  Eduardo Kemp is a 75 year old male with 14 years of education. The patient was referred for a neuropsychological evaluation by Dr. Velez to assess his cognitive and emotional functioning secondary to concerns about his memory and cognitive functioning in the context of COVID-19 infection. The evaluation was requested in order to document his current level of functioning, assist with the differential diagnosis and provide appropriate recommendations to the patient, family and treatment team. The patient was informed that the evaluation included multiple measures of performance and symptom validity, and he was encouraged to provide his best effort at all times.  The nature of the neuropsychological evaluation was also discussed, including limits of confidentiality (for suspected child or elder abuse, potential homicide or suicide, and court orders). The patient was also informed that the report would be placed on the electronic medical records system.  The patient was also given an opportunity to ask questions. The patient indicated that he understood the information and consented to participate in the evaluation.    Due to circumstances that prevent in-person clinical visits, this assessment was conducted using telehealth methods (including remote audiovisual presentation of test instructions and test stimuli). The standard administration of these procedures involves in-person, face-to-face methods. The impact of applying non-standard administration methods has been evaluated only in part by scientific research. While every effort was made to simulate standard assessment practices, the diagnostic conclusions and recommendations for treatment provided in this  report are being advanced with these reservations. The evaluation was conducted via telephone with the patient in his room on the [facility] unit.    PROCEDURES:   Review of records and clinical interview , Test of Sustained Attention and Tracking,   RBANS, Mental Status-orientation, Geriatric Depression Scale, ILS Health and Safety Questions, Ferguson Anxiety Inventory, Verbal Fluency Test, Tinajero Verbal Learning Test-Revised, BDAE Complex Ideational Material and Wechsler Adult Intelligence Scale-IV    REVIEW OF RECORDS: Records from 4/27/2020 noted that the patient was  independent prior to this admit, who initially presented to [facility] on 3/25/20 with fever and cough. He has PMH of Hypertension and hyperlipidemia. He had just returned from [place] from a trip. He tells me that he and his wife were together for a long drive on 3/19/20. On presentation, he was noted to be in respiratory distress secondary to ARDS and pneumonia, and was intubated. He was transferred to [facility] for Remdesivir treatment. Eventually transferred to [facility] on 4/4/2020 for ongoing management of COVID19 and was tested positive on 3/25/20. He remained intubated gustafson about 2 weeks. He is on oxygen via nasal cannula for saturation of 92%.  Following extubation, he reports significant weakness, and fatigue. He did experience ICU delirium/encephalopathy. He states he still notes short term memory concerns. He was on Lovenox for DVT prophylaxis and changed to xerolta for 30 days prior to discharge, per discharge summary. Continues to be on HCTZ for blood pressure control. Records indicated that he was being treated with Tylenol, Norvasc, Lovenox, Lasix, melatonin, multivitamins, Miralax, psyllium, quetiapine, Senna, and tamsulosin.      Records from 4/27/2020 noted that he  was admitted to [facility] and treated for the past month with initial intubation, vasopressor support, broad spectrum antibiotics and was able to wean off  those supports and for the past three weeks has been recovering at [facility]. He weaned off antibiotics, he weaned down on oxygen to 1-2L and was able to walk up and down the hallways at the hospital without feeling short of breath. He no longer has a cough and has been fever free over a month. His repeat COVID testing on 4/22/20 was still positive, likely secondary to remaining RNA in his system. He was started on Rivaroxaban for VTE support just prior to discharge.      CLINICAL INTERVIEW: The interview was conducted via telehealth platform using the telephone.  The patient was interviewed in his room.  The patient reported that he has not observed significant changes in his memory recently and it is been stable.  He noted that he has noted some issues with his short-term memory, but it has not affected his quality of life or ability to gauge activities.  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  Reported that he enjoys doing sudoku which takes considerable concentration.  He also denied difficulty with decision-making or problem-solving.  In terms of language, he reported he has never been good with words.  The patient reported that he drives but denied any difficulty with driving such as accidents or tickets.  He also denied any difficulty with managing finances, although his wife are taken over this task since he has been hospitalized.  He denied any difficulty with medication management or basic activities of daily living prior to his hospitalization.    Medically, the patient confirmed his history of COVID-19 infection.  He reported that the typically spent 3 months a year in [place], but decided come home early because of the COVID-19 pandemic.  He stated that at some point during his drive he feels he was infected, and shortly after returning home he developed symptoms reported that his wife called 911 and he was sent to the hospital but was discharged  "after being given fluids.  The next night he passed out and was sent emergently to the hospital again where he was eventually diagnosed with pneumonia and COVID-19.  He reported that he was told he was in Remisivir trial and was placed and an induced coma for 10 days while at [facility].  In terms of other medical issues, he reported is generally been healthy.  He noted that he was tested for sleep apnea but was never told the results.  Is reported that he is a history of hypertension, but it is very well controlled and has been doing quite well in the hospital.  He denied any history of traumatic brain injury with loss of consciousness.  He also denied any history of cirrhosis, stroke, seizures, movement disorders, hypercholesterolemia, hypothyroidism, diabetes, heart disease, cancer, liver disease, or kidney disease.  He reported he has eyeglasses but he has misplaced them.  He reported that he can see well without them.  He denied any history of eye diseases or hearing problems.    In terms of his mood, he reported that he is \"very positive\" and denied any history of depression or anxiety.  He noted that he has had difficulty falling asleep while in the hospital, but at home typically sleeps well.  He noted when he first became sick his appetite was very poor, but now it has improved and is doing well.  The patient denied any previous contact with mental health professionals, and denied any suicidal or homicidal ideation.  He denied any history of suicide attempt.  He denied any hallucinations or delusions, although reported experiencing some hallucinations when he was in the induced coma.  The patient reported that he drinks approximately 3 to 4 glasses of wine per week.  He denied any use of tobacco or illicit substances.    Academically, reported that he has an associates degree and a degree from the [employer].  He reported that he was an average student in school and was never in special " education classes or had repeated grades.  He reported that he is retired and previously worked as a  for a [employer] company for 39 years.  He reported he has been  for 53 years and has 3 children, including 1 daughter and 2 sons, all of whom live in the [facility].  He noted that he lives at home with his wife.    BEHAVIORAL OBSERVATIONS:  On examination, the patient was casually but appropriately dressed and groomed. The patient was cooperative with the evaluation and was talkative. The patient appeared to put forth her best effort on all tasks. Thus, the results of this evaluation are a reasonably valid reflection of the patient s current level of functioning. There were no indications of hallucinations, delusions or unusual thought processes. The patient was generally well oriented and he did not demonstrate fatigue or endurance issues during formal testing.     RESULTS: Formal performance validity measures were within expected limits and consistent with the above-noted observations.  Psychometric estimates of the patient's premorbid intellectual functioning based upon his vocabulary ability were in the average to high average range, which is consistent with his educational and occupational history.  Thus, there was no evidence for significant change or decline in global cognitive functioning.    Measures of attention/concentration were generally within expected limits.  For example, a digit span task was in the average range.  Additionally, an oral sequencing task that integrates attention was within expectancy.  A timed measure of attention and processing speed was also within expectancy.    Measures of the patient's memory functioning were variable, but indicated mild inefficiency with encoding of new verbal information, at least at times.  Specifically, on a verbal list learning task, initial encoding was in the mildly impaired range.  Delayed recall was also in the impaired  range, but his performance was consistent with his initial encoding.  A recognition format was somewhat better but still poor than expected.  Immediate and delayed verbal recall on a story was better and within expected limits.  He tended to benefit from increased structure when learning new information.    Expressive and receptive language functioning was variable.  For sample, while vocabulary ability was in the average range, phonemic and semantic fluency were in the mildly impaired range.  Receptive language functioning, as measured by a complex ideational material task, was also poorer than expected and in the mildly impaired range.  Visual-spatial abilities could not be evaluated due to the telephone platform for this evaluation.    Measures of the patient's executive functioning were also mildly variable.  For example, verbal abstract reasoning was in the average range.  Additionally, an oral sequencing test and integrates cognitive flexibility and attention was within expectancy.  A functional measure of health and safety awareness was within expected limits.  However, semantic and phonemic fluency were poorer than expected, however.    Assessment of the patient's emotional functioning was completed utilizing the clinical interview and self-report measures of depression and anxiety.  On the self-report measures, the patient denied clinically significant symptoms of depression or anxiety.  This is consistent with his self-report during the interview.    SUMMARY:  The following opinions and recommendations are based on the interview and formal testing data obtained via telephone, thus all results were interpreted with caution.  In summary, the neuropsychological assessment results indicated mild inefficiency in encoding new verbal information, at least at times.  However, there was no evidence for rapid forgetting of previously learned information.  Executive functioning was variable, as was language  processing ability.  Verbal fluency and receptive language were poorer than expected.  Attention/ concentration was within expected limits. However, there was no evidence for significant emotional distress.  Thus, the results indicated evidence the patient met criteria for a diagnosis of mild cognitive impairment.  The specific etiology of his cognitive difficulties could not be completely determined based on this ideation alone, but his recent COVID-19 infection and extended recovery are likely significant contributing factors.  Given there was no evidence for significant emotional distress, this is unlikely to be a contributing issue to his difficulties.  Other etiological factors could not be completely ruled out, although the pattern was not consistent with a progressive dementing condition such as Alzheimer's disease. Given the very recent infection and his extended recovery, improvement in his cognitive functioning over the next several months is likely.     RECOMMENDATIONS:  1. A referral for cognitive rehabilitation therapy, such as with a speech therapist, is recommended to facilitate his cognitive recovery.  2. The patient may find the following attention and organizational strategies helpful:     Use cell phone reminders to help monitor upcoming appointments and due dates as well as other important tasks (e.g., when to take medications). Set the reminder to go off several times prior to the event with advanced notice (e.g., one week before, two days before, the day before, and the morning of the event).     He should attempt to reduce distractions at home. Having a clutter-free work environment and removing or at least making it harder to access potential distractions would help optimize his attention. He might also consider facing away from high traffic areas and using earplugs or noise cancellation headphones when desiring a quiet work environment.    Taking short breaks during his day may help optimize  and maintain his concentration.     Make to-do lists and prioritize tasks. Break down large tasks into smaller steps and check off each small step when completed.   3. The results of the evaluation also indicate that he has adequate capacity for informed personal and medical decision making. However, given his poorer than expected memory, family assistance with complex decision making is recommended.     4. In order to promote learning and memorization of new verbal material, it is recommended that the patient add structure to the material he is learning to promote subsequent recall (e.g., use mnemonic devices, acronyms, make up a story or song). Additionally, he is encouraged to use visual aids, such as flash cards, diagrams, charts, etc.  5. Given his current memory difficulties, it is recommended that the patient not reside alone and that he avoid potentially dangerous activities, such as cooking or driving, at this time unless he is supervised.  6. The results of the evaluation now constitute a baseline of the patient s cognitive functioning.  A neuropsychological re-evaluation on an outpatient basis in approximately 6-8 months is  recommended to clarify the differential diagnosis, document changes in his cognitive functioning, and provide further recommendations and prognosis to the patient, family, and treatment team.    Thank you for referring this interesting individual. If you have any questions, please feel free to contact me.    Timi Do, PhD, ABPP, LP  Professor and Licensed Psychologist [id number]  Board Certified Clinical Neuropsychologist  Phone: [phone]  Fax: [phone]    Time Spent:    Minutes Date Code Units   Total Time-Neuropsychologist Professional 212 4/30/2020 40920 1      [id number] 3   Neuropsychologist Admin/scoring 0 4/30/2020 05744 0      [id number] 0   Diagnostic Interview 24 4/29/2020 87226 1   Psychometrician Time-Test Administration/Score 111 4/30/2020 99377 1      52935 3    Diagnosis G31.84 U07.1 R41.9

## 2020-04-29 NOTE — PLAN OF CARE
OT: Pt completing ADLs SBA with fww. Strong emphasis on EC education and initiation of strategies. Administered MoCA (score of 26 or greater considered WNL): pt scoring 28/30. Mild deficits with delayed recall. Not demonstrating any significant deficits with functional tasks.     At rest: 3LPM O2 NC, O2 sats >90%, asymptomatic.  ADLs (standing at EOS, dressing in chair): 5 LPM O2 NC, O2 sats 82-88%, symptomatic coughing and shortness of breath. Able to recover with seated rest break within 30-60 sec.  Standing BUE exercises in pm : 5LPM O2 NC, O2 sats 86-93%, symptomatic. Again, able to recover with seated rest break and cues for breathing technique within 30-60 sec.

## 2020-04-29 NOTE — PLAN OF CARE
Patient is Alert and Oriented x4, calls to make needs known. Calm and pleasant for all cares. Denies pain. VSS throughout shift. At the start of shift patient was on 5LPM of O2 sating at 96%, titrated back down to 3 LPM at rest with sats at 94%. Using urinal at bedside, although patient did spill urinal onto clothing x1 this shift requiring change of clothing and wash up. Patient reports small BM this morning. PIV to L. Forearm patent, saline locked. Nasopharyngeal swab results from 4/28 are negative, will have repeat swab on 4/29. COVID/ droplet precautions maintained throughout shift. Continue with POC.

## 2020-04-29 NOTE — PROGRESS NOTES
04/28/20 1000   Quick Adds   Type of Visit Initial Occupational Therapy Evaluation   Living Environment   Lives With spouse   Living Arrangements house   Home Accessibility stairs to enter home   Number of Stairs, Main Entrance 3   Stair Railings, Main Entrance railing on right side (ascending)   Transportation Anticipated family or friend will provide   Living Environment Comment OT: Pt lives with spouse in a 1 level home. Bathroom setup: walk in shower with seat. Standard toilet no grab bar. Bedroom set up: regular bed,  no moveable features. Spouse    Self-Care   Usual Activity Tolerance good   Current Activity Tolerance fair   Regular Exercise Yes   Activity/Exercise Type walking   Exercise Amount/Frequency daily   Activity/Exercise/Self-Care Comment OT: Retired. Travels to [place] every winter. Pt walks 2 miles daily, outside or on treadmill. Active with household projects. Enjoys spending time with family.     Functional Level   Ambulation 0-->independent   Transferring 0-->independent   Toileting 0-->independent   Bathing 0-->independent   Dressing 0-->independent   Eating 0-->independent   Communication 0-->understands/communicates without difficulty   Swallowing 0-->swallows foods/liquids without difficulty   Cognition 0 - no cognition issues reported   Fall history within last six months yes   Number of times patient has fallen within last six months 1   Which of the above functional risks had a recent onset or change? ambulation;transferring;toileting;bathing;dressing   Prior Functional Level Comment OT: Pt IND ADLs/IADLs and functional mobility       Present no   Language English   General Information   Onset of Illness/Injury or Date of Surgery - Date 03/25/20   Referring Physician Lore Saldivar MD   Patient/Family Goals Statement Pt goal to return home stronger, more independent.    Additional Occupational Profile Info/Pertinent History of Current Problem Eduardo  YOGESH Kemp is a(n) 75 year old yo male with a past medical history of HTN, HLD, and BPH who was previously admitted for SARS-CoV-2 positive pneumonia.  His date of symptom onset is estimated to be 03/25/20 with fever and cough, which is also when he was confirmed positive with testing.  He recently traveled from [place] and his wife contracted a milder case of SARS-CoV-2.  His hospital course was complicated by a prolonged ICU stay for ARDS with intubation for 2 weeks with a secondary bacterial pneumonia.  He has post-ICU weakness and dyspnea and O2 desaturation on mild exertion.  He continued to stabilize and was admitted to the ARU on 4/27/20.   Precautions/Limitations fall precautions;oxygen therapy device and L/min;other (see comments)  (droplet/covid precautions)   Weight-Bearing Status - LUE full weight-bearing   Weight-Bearing Status - RUE full weight-bearing   Weight-Bearing Status - LLE full weight-bearing   Weight-Bearing Status - RLE full weight-bearing   Heart Disease Risk Factors Age;Gender;Medical history   Cognitive Status Examination   Orientation orientation to person, place and time   Level of Consciousness alert   Follows Commands (Cognition) WNL   Memory impaired   Attention No deficits were identified   Cognitive Comment Plan to further assess with screen, experienced ICU delirium, acknowledges difficulties with recall but feels issues have resolved and he is now a baseline.   Visual Perception   Visual Perception Wears glasses   Visual Acuity Wears reading glasses, does not require all day   Visual Perception Comments No acute changes   Sensory Examination   Sensory Comments No numbness/tingling in extremities    Pain Assessment   Patient Currently in Pain No   Integumentary/Edema   Integumentary/Edema Comments IV left wrist    Posture   Posture not impaired   Range of Motion (ROM)   ROM Comment BUE AROM WFL   Strength   Strength Comments Deconditioning evident with activity. BUE strength  4+/5 grossly   Hand Strength   Hand Strength Comments Limited  strength in L hand due to previous L thumb injury. R  WNL.   Muscle Tone Assessment   Muscle Tone Quick Adds No deficits were identified   Coordination   Upper Extremity Coordination No deficits were identified   ARC Assessment Only   Acute Rehab Functional Assessment See IP Rehab Daily Documentation Flowsheet for Functional Mobility/ADL Assessment   Transfer Skill: Bed to Chair/Chair to Bed   Level of [place]: Bed to Chair stand-by assist   Physical Assist/Nonphysical Assist: Bed to Chair supervision   Transfer Skill: Sit to Stand   Level of [place]: Sit/Stand stand-by assist   Physical Assist/Nonphysical Assist: Sit/Stand supervision   Transfer Skill: Toilet Transfer   Level of [place]: Toilet stand-by assist   Physical Assist/Nonphysical Assist: Toilet supervision   Balance   Balance Quick Add Sitting balance: dynamic;Sit to stand balance;Standing balance: static;Standing balance: dynamic   Sitting Balance: Dynamic normal balance   Sit-to-Stand Balance good balance   Standing Balance: Static good balance   Standing Balance: Dynamic fair balance   Bathing   Level of [place] minimum assist (75% patients effort)   Physical Assist/Nonphysical Assist 1 person assist   Upper Body Dressing   Level of [place]: Dress Upper Body stand-by assist   Physical Assist/Nonphysical Assist: Dress Upper Body supervision   Lower Body Dressing   Level of [place]: Dress Lower Body stand-by assist   Physical Assist/Nonphysical Assist: Dress Lower Body supervision   Toileting   Level of [place]: Toilet stand-by assist   Physical Assist/Nonphysical Assist: Toilet supervision   Grooming   Level of [place]: Grooming stand-by assist   Physical Assist/Nonphysical Assist: Grooming supervision   Instrumental Activities of Daily Living (IADL)   Previous Responsibilities meal prep;housekeeping;laundry;shopping;yardwork;medication  management;finances;driving   Activities of Daily Living Analysis   Impairments Contributing to Impaired Activities of Daily Living balance impaired;cognition impaired;strength decreased   ADL Comments O2 dependency, deconditioning, significant changes in vitals signs with ADL completion   General Therapy Interventions   Planned Therapy Interventions ADL retraining;IADL retraining;balance training;bed mobility training;cognition;ROM;strengthening;stretching;transfer training;home program guidelines;progressive activity/exercise   Clinical Impression   Criteria for Skilled Therapeutic Interventions Met yes, treatment indicated   OT Diagnosis Decreased IND ADLs/IADLs   Influenced by the following impairments Deconditioning, weakness, O2 dependency, significant changes in vital signs with activity, medical complexity   Assessment of Occupational Performance 5 or more Performance Deficits   Identified Performance Deficits Performance deficits include mobility, transfers, dressing, bathing, grooming, toileting, meal prep, household management, community transportation.    Clinical Decision Making (Complexity) Moderate complexity   Therapy Frequency Daily   Predicted Duration of Therapy Intervention (days/wks) 7-10 days   Anticipated Equipment Needs at Discharge bathing equipment;dressing equipment;other (see comments)  (fww)   Anticipated Discharge Disposition Home with Home Therapy   Risks and Benefits of Treatment have been explained. Yes   Patient, Family & other staff in agreement with plan of care Yes   Clinical Impression Comments The pt will benefit from skilled OT intervention to address goals in POC and maximize functional IND and safety in d/c environment.    Total Evaluation Time   Total Evaluation Time (Minutes) 20

## 2020-04-29 NOTE — PROGRESS NOTES
"  [facility]   Acute Rehabilitation Unit  Daily progress note    INTERVAL HISTORY  Eduardo Kemp was seen and examined at bedside.     No acute events overnight.  He went to sleep late but was able to sleep well afterward.  He continues to have shortness of breath on supplemental oxygen with light exertion after 5-10 minutes but not at rest.  The shortness of breath is accompanied by cough.  He denies fever or chills.   He denies any issues with his bowel or bladder habits.  He denies any cognitive or memory issues.    Functionally, his shortness of breath and oxygen desaturation llimit participation in PT and OT, requiring 5-7L O2.    MEDICATIONS  Scheduled meds    amLODIPine  5 mg Oral Daily     enoxaparin ANTICOAGULANT  30 mg Subcutaneous Q12H     hydrochlorothiazide  12.5 mg Oral Daily     multivitamin w/minerals  1 tablet Oral Daily     sodium chloride (PF)  3 mL Intracatheter Q8H     tamsulosin  0.4 mg Oral At Bedtime       PRN meds:  acetaminophen, melatonin, polyethylene glycol, psyllium, senna-docusate, sodium chloride (PF)    PHYSICAL EXAM  /74   Pulse 76   Temp 96.5  F (35.8  C) (Oral)   Resp 16   Ht 1.727 m (5' 8\")   Wt 90.5 kg (199 lb 9.6 oz)   SpO2 97%   BMI 30.35 kg/m    Gen: lying in bed, no acute distress  HEENT: no conjunctival injection, normocephalic, no rhinorrhea, neck supple  Cardio: regular rate and rhythm, no murmurs  Pulm: Good air entry bilaterally.  Bilateral rales at bases.  No rhonchi or wheezing.  Abd: active bowel sounds, nontender, soft, nondistended  Ext: no pretibial edema, warm and well perfused.  Left forearm IV, locked.  Neuro/MSK:  Alert, follows commands, speech fluent and comprehensible, conjugate gaze, no facial asymmetry, moves upper and lower extremities volitionally against gravity and resistance.    LABS    Other labs: COVID-19 rapid testing negative x2.    ASSESSMENT AND PLAN  Eduardo Kemp is a(n) 75 year old yo male " with a past medical history of HTN, HLD, and BPH who was previously admitted for SARS-CoV-2 positive pneumonia.  His date of symptom onset is estimated to be 03/25/20 with fever and cough, which is also when he was confirmed positive with testing.  He recently traveled from [place] and his wife contracted a milder case of SARS-CoV-2.  His hospital course was complicated by a prolonged ICU stay for ARDS with intubation for 2 weeks with a secondary bacterial pneumonia.  He has post-ICU weakness and dyspnea and O2 desaturation on mild exertion.  He continued to stabilize and was admitted to the ARU on 4/27/20.     Rehab  #Deconditioning  He endorsed global weakness on admission.  His goal is to achieve modified independence at home by discharge.  - ADL: OT  - Mobility: PT  - Adjustment to disability: Clinical Psych  - Neurocognitive testing to screen for cognitive / speech issues, consult speech if suspicion for need for SLP     #Sleep  #Metabolic Encephalopathy (resolved)  His previous hospital course was complicated by ICU related delirium requiring seroquel.  He has not required seroquel since admission and has slept well without the use of medication.  Seroquel was discontinued.  - melatonin 3mg prn     #Bowel  - prn bowel medications  - prn fiber     #Bladder  - PTA flomax 0.4 daily  - monitor     Pulm  #SARS-CoV-2 Pneumonia  #Hopsital Acquired Pneumonia  He completed a Remdesivir trial (3/25-4/3) and required proning during weaning of ventilation.  D-dimer was elevated on 4/4.  He was treated for secondary HAP with one week of zosyn and azithromycin.  COVID testing on 4/22 was positive, but overall his pulmonary and associated clinical status improved to discharge.  He completed a 30 day course of post-covid anticoagulation.  No current signs of PE.  He had two negative COVID-19 tests since 4/29/2020, though he still has respiratory symptoms.  - Make decision on respiratory precautions adjustment tomorrow  -  3L Supplemental O2 with goal above 92, less than 96  - Medicine is following, appreciate recs  - If he develops wheezing, consider albuterol MDI  - Droplet precautions as long as he is COVID positive and has respiratory symptoms     Cardiac  #Hypertension  - Discontinued lasix per medicine  - Hydrochlorothiazide 12.5mg daily  - amlodipine 5mg daily     #Hyperlipidemia: stable     FEN/GI  #Diet: Regular, thin liquids     Ppx  #GI: none  #DVT: Enoxaparin     Dispo  Code: Full code (verified with patient)  Disposition: To home with family  ELOS: 7 days from 4/27  Rehab Prognosis: Good  #Follow up:  - Primary care provider in 1-2 weeks, likely telemed.  Consider close follow up for any decompensation at home.  - PM&R in 4 months    Chris Hernandes MD  Physical Medicine & Rehabilitation    Staffed with Dr. Velez.

## 2020-04-30 ENCOUNTER — APPOINTMENT (OUTPATIENT)
Dept: PHYSICAL THERAPY | Facility: CLINIC | Age: 75
End: 2020-04-30
Payer: MEDICARE

## 2020-04-30 ENCOUNTER — APPOINTMENT (OUTPATIENT)
Dept: OCCUPATIONAL THERAPY | Facility: CLINIC | Age: 75
End: 2020-04-30
Payer: MEDICARE

## 2020-04-30 ENCOUNTER — HOSPITAL (OUTPATIENT)
Dept: NEUROLOGY | Facility: CLINIC | Age: 75
End: 2020-04-30

## 2020-04-30 PROBLEM — J18.9 LEFT LOWER LOBE PNEUMONIA: Status: ACTIVE | Noted: 2020-03-23

## 2020-04-30 PROBLEM — J96.01 ACUTE RESPIRATORY FAILURE WITH HYPOXIA (H): Status: RESOLVED | Noted: 2020-03-25 | Resolved: 2020-04-30

## 2020-04-30 PROBLEM — J96.01 ACUTE RESPIRATORY FAILURE WITH HYPOXIA (H): Status: ACTIVE | Noted: 2020-03-25

## 2020-04-30 PROCEDURE — 12800006 ZZH R&B REHAB

## 2020-04-30 PROCEDURE — 97535 SELF CARE MNGMENT TRAINING: CPT | Mod: GO | Performed by: OCCUPATIONAL THERAPIST

## 2020-04-30 PROCEDURE — 25000132 ZZH RX MED GY IP 250 OP 250 PS 637: Mod: GY | Performed by: STUDENT IN AN ORGANIZED HEALTH CARE EDUCATION/TRAINING PROGRAM

## 2020-04-30 PROCEDURE — 97530 THERAPEUTIC ACTIVITIES: CPT | Mod: GP

## 2020-04-30 PROCEDURE — 25000128 H RX IP 250 OP 636: Performed by: PHYSICAL MEDICINE & REHABILITATION

## 2020-04-30 PROCEDURE — 97110 THERAPEUTIC EXERCISES: CPT | Mod: GP

## 2020-04-30 PROCEDURE — 97110 THERAPEUTIC EXERCISES: CPT | Mod: GO | Performed by: OCCUPATIONAL THERAPIST

## 2020-04-30 PROCEDURE — 40000183 ZZH STATISTIC PT MED CONFERENCE < 30 MIN

## 2020-04-30 PROCEDURE — 40000187 ZZH STATISTIC PATIENT MED CONFERENCE < 30 MIN

## 2020-04-30 PROCEDURE — 25000132 ZZH RX MED GY IP 250 OP 250 PS 637: Mod: GY | Performed by: PHYSICAL MEDICINE & REHABILITATION

## 2020-04-30 RX ADMIN — MELATONIN TAB 3 MG 3 MG: 3 TAB at 21:09

## 2020-04-30 RX ADMIN — ENOXAPARIN SODIUM 30 MG: 30 INJECTION SUBCUTANEOUS at 21:09

## 2020-04-30 RX ADMIN — MULTIPLE VITAMINS W/ MINERALS TAB 1 TABLET: TAB at 09:27

## 2020-04-30 RX ADMIN — HYDROCHLOROTHIAZIDE 12.5 MG: 12.5 CAPSULE ORAL at 09:26

## 2020-04-30 RX ADMIN — TAMSULOSIN HYDROCHLORIDE 0.4 MG: 0.4 CAPSULE ORAL at 21:09

## 2020-04-30 RX ADMIN — ENOXAPARIN SODIUM 30 MG: 30 INJECTION SUBCUTANEOUS at 09:27

## 2020-04-30 NOTE — PLAN OF CARE
FOCUS/GOAL  Medication management and Medical management    ASSESSMENT, INTERVENTIONS AND CONTINUING PLAN FOR GOAL:  A&O, SBA w/ walker. Makes needs known, alarms on.  Pt denied pain during evening.  Continent of B/B, LBM 4/28.  On 3L O2 via nasal cannula. Continue with POC.

## 2020-04-30 NOTE — PLAN OF CARE
FOCUS/GOAL  Bowel management, Bladder management, Mobility, Skin integrity, and Cognition/Memory/Judgment/Problem solving    ASSESSMENT, INTERVENTIONS AND CONTINUING PLAN FOR GOAL:  Alert and oriented x4, continent B/B, LBM today. Pt ate 100% of breakfast, did not order lunch yet. Pt now off of special precautions and moved to room 548. Rounds today, see rounds notes. Neuropsych consult today, see consult notes. VSS, denied pain. Still needing increased o2 with activity. Advanced to modified independence with FWW in room. Continue POC.

## 2020-04-30 NOTE — PLAN OF CARE
AxO. 5L via NC overnight. Denies pain. PIV left lower forearm SL. Ax1 with walker and gait belt. Continent B&B, urinal, LBM 4/28. Combo/regular, thin. Skin intact except bruises. Call light within reach.

## 2020-04-30 NOTE — PLAN OF CARE
OT Summary:    ADLs/IADLs: Pt progressing to Mod IND in room with 4ww. Strong emphasis on energy conservation strategies and breathing mechanics. Pt on 5L O2 NC, max desaturation 81%, tends to drop lower in am session and improve throughout the day. Plan to complete full shower tomorrow. IADLs have not been assessed.    Exercise/vitals: Standing UE ergometer interval training, calesthenics, and strengthening with resisted band. Pt on 5L O2 NC, Max desat 86%. HR ranging 70-80% of max HR.     DME/AE: 4ww, bathroom grab bars, oximeter.     Caregiver support: Pt lives with spouse. Spouse recovering from COVID-19, mild symptoms per pt report.     Discharge plan: Anticipate discharge on 5/5 with home care. Anticipate oxygen needs.

## 2020-04-30 NOTE — PROGRESS NOTES
"  [facility]   Acute Rehabilitation Unit  Daily progress note    INTERVAL HISTORY  Eduardo Kemp was seen and examined at bedside.     No acute events overnight.  He had trouble getting to sleep, but afterwards slept well.  He continues to have shortness of breath and oxygen desaturation with exertion, along with a productive cough.  Resting relieves his dyspnea.  His last bowel movement was today.  He denies problems with urination.    Functionally, he continues to have symptomatic dyspnea and hypoxia during therapies but is able to participate.    MEDICATIONS  Scheduled meds    amLODIPine  5 mg Oral Daily     enoxaparin ANTICOAGULANT  30 mg Subcutaneous Q12H     hydrochlorothiazide  12.5 mg Oral Daily     multivitamin w/minerals  1 tablet Oral Daily     sodium chloride (PF)  3 mL Intracatheter Q8H     tamsulosin  0.4 mg Oral At Bedtime       PRN meds:  acetaminophen, melatonin, polyethylene glycol, psyllium, senna-docusate, sodium chloride (PF)    PHYSICAL EXAM  /74   Pulse 91   Temp 97.2  F (36.2  C) (Oral)   Resp 18   Ht 1.727 m (5' 8\")   Wt 90.5 kg (199 lb 9.6 oz)   SpO2 96%   BMI 30.35 kg/m    Gen: sitting up in bed, no acute distress  HEENT: no conjunctival injection, normocephalic, no rhinorrhea, neck supple  Cardio: regular rate and rhythm, no murmurs  Pulm: Good air entry bilaterally.  Right > left inspiratory rales at lung bases bilaterally.  No rhonchi or wheezing.  Abd: Active bowel sounds, nontender, soft, nondistended  Ext: trace pretibial edema bilaterally, warm and well perfused  Neuro/MSK:  Alert, follows commands, speech fluent and comprehensible, conjugate gaze, no facial asymmetry, moves upper and lower extremities volitionally against gravity.    LABS  No recent labs.    ASSESSMENT AND PLAN  Eduardo Kemp is a(n) 75 year old yo male with a past medical history of HTN, HLD, and BPH who was previously admitted for SARS-CoV-2 positive pneumonia. "  His date of symptom onset is estimated to be 03/25/20 with fever and cough, which is also when he was confirmed positive with testing.  He recently traveled from [place] and his wife contracted a milder case of SARS-CoV-2.  His hospital course was complicated by a prolonged ICU stay for ARDS with intubation for 2 weeks with a secondary bacterial pneumonia.  He lost 40lbs during this admission. He has post-ICU weakness and dyspnea and O2 desaturation on mild exertion.  He continued to stabilize and was admitted to the ARU on 4/27/20.     Rehab  #Deconditioning  He endorsed global weakness on admission.  His goal is to achieve modified independence at home by discharge.  Barriers to discharge include clarification of his therapy needs, supplemental oxygen requirements, and training of his family support system.  - ADL: OT  - Mobility: PT  - Adjustment to disability: Clinical Psych  - Follow up neuropsych testing.     #Sleep  #Metabolic Encephalopathy (resolved)  His previous hospital course was complicated by ICU related delirium requiring seroquel.  He has not required seroquel since admission and has slept well without the use of medication.  Seroquel was discontinued.  He had no additional events of confusion during his rehab stay.  - melatonin 3mg prn  - encourage good sleep hygiene     #Bowel: he had regular bowel movements during his hospital stay.  - prn bowel medications  - prn fiber     #Bladder  #Benign Prostatic Hypertrophy  - PTA flomax 0.4 daily  - monitor     Pulm  #SARS-CoV-2 Pneumonia  #Hopsital Acquired Pneumonia  He completed a Remdesivir trial (3/25-4/3) and required proning during weaning of ventilation.  D-dimer was elevated on 4/4.  He was treated for secondary HAP with one week of zosyn and azithromycin.  COVID testing on 4/22 was positive, but overall his pulmonary and associated clinical status improved to discharge.  He completed a 30 day course of post-covid anticoagulation.  No current  signs of PE.  He had two negative COVID-19 tests since 4/29/2020, though he still has respiratory symptoms.  As his respiratory symptoms improved, he was cleared of droplet precautions on 4/30/2020.  - 5L Supplemental O2 with goal above 92.  Extra supplemental oxygen during therapies  - Medicine is following, appreciate recs  - If he develops wheezing, consider albuterol MDI  - Will likely require supplemental oxygen at home, to be followed up by PCP and PM&R  - Find out of he needs pulse oximetry at home (medicine)     Cardiac  #Hypertension  His blood pressure has hovered around 100-110/60-70 during admission.  His last two doses of amlodipine were held because of this.  - Discontinued lasix per medicine  - Hydrochlorothiazide 12.5mg daily  - amlodipine 5mg daily  - continue to titrate blood pressure medication while inpatient (medicine)     #Hyperlipidemia: stable     FEN/GI  #Diet: Regular, thin liquids    #PIV access: Remove today     Ppx  #GI: none  #DVT: Enoxaparin     Dispo  Code: Full code (verified with patient)  Disposition: To home with family  ELOS: 7 days from 4/27, Estimated discharge date 05/05/2020  Rehab Prognosis: Good  #Follow up:  - Primary care provider in 1-2 weeks, likely telemed.  Consider close follow up for any decompensation at home.  - PM&R in 4 months    Chris Hernandes MD  Physical Medicine & Rehabilitation    Staffed with Dr. Velez.

## 2020-04-30 NOTE — CARE CONFERENCE
Acute Rehab Care Conference/Team Rounds      Type: Team Rounds    Present: Nathan Velez DO; Chris Hernandes MD; Gina Borja RN; Mey Salgado OT; Earl Ag PT; Vita Brown SW; Kimberlee Menezes RD; Shelly Mueller      Discharge Barriers/Treatment/Education    Rehab Diagnosis: severe debility 2/2 prolonged medical course due to covid infection     Active Medical Co-morbidities/Prognosis: ARDS/Pneumonia, HTN, HL, BPH     Safety: No alarms    Pain: Denies    Medications, Skin, Tubes/Lines: PIV left lower forearm SL    Swallowing/Nutrition:    Bowel/Bladder: Continent B&B, LBM 4/28, urinal    Psychosocial: . Pt and wife live in a ranch-style home in [place], [place]. Two-story (basement). Laundry on the main-level. 3 NEAL front door and 2 NEAL from garage (grab bar). All living on the main level. Independent with ADLs and IADLs. No use of assistive device. Manage own medications and finances. Driving. 3 adult children who live local. No mental health, substance use or financial concerns reported.     ADLs/IADLs: Early in rehab stay, pt is SBA with self cares, transfers, and mobility using FWW and primarily limited by decreased cardiopulmonary function. Requires 5-6L 02 via NC with activity to maintain sats in 80-90s% and need to focus on training in breathing mechanics, ADL independence, and activity tolerance. Will required 02 at home, 4WW,  OT and spouse can supervise 24/7    Mobility:  To achieve a level of mod I     Cognition/Language: intact    Community Re-Entry: to achieve a level of mod i    Transportation: will need assistance for time being     Decision maker: self    Plan of Care and goals reviewed and updated.    Discharge Plan/Recommendations    Fall Precautions: continue    Patient/Family input to goals: yes    Estimated length of stay: 7 - 10 days    Overall plan for the patient: achieve a level of mod I       Utilization Review and Continued Stay Justification    Medical Necessity Criteria:    For any  criteria that is not met, please document reason and plan for discharge, transfer, or modification of plan of care to address.    Requires intensive rehabilitation program to treat functional deficits?: Yes    Requires 3x per week or greater involvement of rehabilitation physician to oversee rehabilitation program?: Yes    Requires rehabilitation nursing interventions?: Yes    Patient is making functional progress?: Yes    There is a potential for additional functional progress? Yes    Patient is participating in therapy 3 hours per day a minimum of 5 days per week or 15 hours per week in 7 day period?:Yes    Has discharge needs that require coordinated discharge planning approach?:Yes      Barriers/Concerns related to meeting medical necessity criteria:  none    Team Plan to Address Concern:  As needed       Final Physician Sign off    Statement of Approval: Nathan Velez, DO      Patient Goals  Social Work Goals:Confirm discharge recommendations with therapy, coordinate safe discharge plan and remain available to support and assist as needed.      OT Frequency:  min daily  OT goal: hygiene/grooming: independent  OT goal: upper body dressing: Independent  OT goal: lower body dressing: Modified independent  OT goal: upper body bathing: Modified independent  OT goal: lower body bathing: Supervision/stand-by assist  OT goal: bed mobility: Independent  OT Goal: transfer: Modified independent  OT goal: toilet transfer/toileting: Modified independent  OT goal: perform aerobic activity with stable cardiovascular response: intermittent activity, 15 minutes  OT goal 1: Pt will demo IND with initiating EC strategies with activity to maximize IND and safety with ADLs/IADLs  OT goal 2: Pt will demo IND with BUE HEP with focus on strengthening and endurance          PT Frequency: 90 min daily  PT goal: bed mobility: Independent, Supine to/from sit, Rolling  PT goal: transfers: Independent, Sit to/from stand, Bed  to/from chair, Assistive device(FWW)  PT goal: gait: Modified independent, 100 feet, Rolling walker  PT goal: stairs: Modified independent, Assistive device, 1 stair(FWW)  PT goal: perform aerobic activity with stable cardiovascular response: continuous activity, 10 minutes(seated floor bike)     PT goal 1: HEP - independent with handout for standing BLE strengthening and endurance (walking vs seated floor bike)                                                                      RN Goals:   Goal 1: Medical Management   Pt will demonstrate proper use of incentive spirometer and utilize it 10 times every 2 hours daily by 5/8/20.   Goal 2: Mobility   Pt will be able to ambulate 100ft with or without assistive device by 5/8/20.  Goal 3: Skin Integrity   Pt will verbalize 3 methods to prevent skin breakdown to remain free of skin breakdown throughout stay at ARU.   Goal 4: Safety Management   Pt will utilize call light to verbalize needs and wait for assistance before ambulating throughout stay at ARU.

## 2020-04-30 NOTE — PROGRESS NOTES
"Infection Prevention reviewed this patient's chart per request of Nursing Director Sylvia Klein. Writer reviewed patient's COVID-19 history to determine if COVID \"Special Precautions\" can be discontinued.     Patient first tested positive for SARS-CoV-2 (COVID-19) on 3/23/2020 at [facility]. Positive 4/23/2020 at [facility].    Negative screens 4/28/2020 and 4/29/2020.    Pt still with oxygen needs and SOB with cough after physical activity, but this has improved since admit to Acute Rehab and SOB/cough are not present at rest.     Patient meets the following isolation discontinuation criteria for COVID-19:   -21 days since symptom onset with 3 days of resolution of fever and improvement in respiratory symptoms.    -2 negative tests, collected at least 24 hours apart.    Once moved to a new patient room, Infection Prevention will remove COVID-19 infection flag and isolation orders.     -Continue to practice Standard Precautions, universal masking, and extended eye protection per hospital guidelines.   -Continue to educate patient on the importance of hand hygiene & frequent cleaning and disinfection of reusable and personal items (e.g. phone, tablet, pens, etc.).   -Initiate isolation precautions if new symptoms develop, per standard protocol.     Please do not hesitate to contact Infection Prevention with questions.     Kriss Klein 4/30/2020 9:12 AM   [facility] Infection Prevention  [phone]   "

## 2020-04-30 NOTE — PROGRESS NOTES
The patient was seen for neuropsychological evaluation via telehealth at the request of Nathan Velez DO for the purposes of diagnostic clarification and treatment planning.  111 minutes of telephone test administration and scoring were provided by this writer.  Please see Dr. Timi Do's report for a full interpretation of the findings.    Ya Rocha  Psychometrist

## 2020-05-01 ENCOUNTER — APPOINTMENT (OUTPATIENT)
Dept: OCCUPATIONAL THERAPY | Facility: CLINIC | Age: 75
End: 2020-05-01
Payer: MEDICARE

## 2020-05-01 ENCOUNTER — APPOINTMENT (OUTPATIENT)
Dept: PHYSICAL THERAPY | Facility: CLINIC | Age: 75
End: 2020-05-01
Payer: MEDICARE

## 2020-05-01 LAB
ANION GAP SERPL CALCULATED.3IONS-SCNC: 6 MMOL/L (ref 3–14)
BUN SERPL-MCNC: 13 MG/DL (ref 7–30)
CALCIUM SERPL-MCNC: 9 MG/DL (ref 8.5–10.1)
CHLORIDE SERPL-SCNC: 104 MMOL/L (ref 94–109)
CO2 SERPL-SCNC: 27 MMOL/L (ref 20–32)
CREAT SERPL-MCNC: 0.85 MG/DL (ref 0.66–1.25)
ERYTHROCYTE [DISTWIDTH] IN BLOOD BY AUTOMATED COUNT: 14 % (ref 10–15)
GFR SERPL CREATININE-BSD FRML MDRD: 85 ML/MIN/{1.73_M2}
GLUCOSE SERPL-MCNC: 88 MG/DL (ref 70–99)
HCT VFR BLD AUTO: 36.5 % (ref 40–53)
HGB BLD-MCNC: 11.9 G/DL (ref 13.3–17.7)
MCH RBC QN AUTO: 30.3 PG (ref 26.5–33)
MCHC RBC AUTO-ENTMCNC: 32.6 G/DL (ref 31.5–36.5)
MCV RBC AUTO: 93 FL (ref 78–100)
PLATELET # BLD AUTO: 165 10E9/L (ref 150–450)
POTASSIUM SERPL-SCNC: 3.6 MMOL/L (ref 3.4–5.3)
RBC # BLD AUTO: 3.93 10E12/L (ref 4.4–5.9)
SODIUM SERPL-SCNC: 137 MMOL/L (ref 133–144)
WBC # BLD AUTO: 6 10E9/L (ref 4–11)

## 2020-05-01 PROCEDURE — 12800006 ZZH R&B REHAB

## 2020-05-01 PROCEDURE — 97110 THERAPEUTIC EXERCISES: CPT | Mod: GO

## 2020-05-01 PROCEDURE — 25000132 ZZH RX MED GY IP 250 OP 250 PS 637: Mod: GY | Performed by: PHYSICAL MEDICINE & REHABILITATION

## 2020-05-01 PROCEDURE — 97110 THERAPEUTIC EXERCISES: CPT | Mod: GP

## 2020-05-01 PROCEDURE — 99207 ZZC CDG-MDM COMPONENT: MEETS LOW - DOWN CODED: CPT | Performed by: PHYSICIAN ASSISTANT

## 2020-05-01 PROCEDURE — 25000132 ZZH RX MED GY IP 250 OP 250 PS 637: Mod: GY | Performed by: STUDENT IN AN ORGANIZED HEALTH CARE EDUCATION/TRAINING PROGRAM

## 2020-05-01 PROCEDURE — 97530 THERAPEUTIC ACTIVITIES: CPT | Mod: GO

## 2020-05-01 PROCEDURE — 97535 SELF CARE MNGMENT TRAINING: CPT | Mod: GO

## 2020-05-01 PROCEDURE — 25000128 H RX IP 250 OP 636: Performed by: PHYSICAL MEDICINE & REHABILITATION

## 2020-05-01 PROCEDURE — 99232 SBSQ HOSP IP/OBS MODERATE 35: CPT | Performed by: PHYSICIAN ASSISTANT

## 2020-05-01 PROCEDURE — 80048 BASIC METABOLIC PNL TOTAL CA: CPT | Performed by: PHYSICIAN ASSISTANT

## 2020-05-01 PROCEDURE — 36415 COLL VENOUS BLD VENIPUNCTURE: CPT | Performed by: PHYSICIAN ASSISTANT

## 2020-05-01 PROCEDURE — 85027 COMPLETE CBC AUTOMATED: CPT | Performed by: PHYSICIAN ASSISTANT

## 2020-05-01 RX ADMIN — TAMSULOSIN HYDROCHLORIDE 0.4 MG: 0.4 CAPSULE ORAL at 20:56

## 2020-05-01 RX ADMIN — ENOXAPARIN SODIUM 30 MG: 30 INJECTION SUBCUTANEOUS at 20:57

## 2020-05-01 RX ADMIN — AMLODIPINE BESYLATE 5 MG: 5 TABLET ORAL at 11:12

## 2020-05-01 RX ADMIN — ENOXAPARIN SODIUM 30 MG: 30 INJECTION SUBCUTANEOUS at 11:15

## 2020-05-01 RX ADMIN — MELATONIN TAB 3 MG 3 MG: 3 TAB at 20:57

## 2020-05-01 RX ADMIN — MULTIPLE VITAMINS W/ MINERALS TAB 1 TABLET: TAB at 11:16

## 2020-05-01 RX ADMIN — HYDROCHLOROTHIAZIDE 12.5 MG: 12.5 CAPSULE ORAL at 11:17

## 2020-05-01 NOTE — PROGRESS NOTES
[facility]  Consult Note - Hospitalist Service     Date of Admission:  4/27/2020  Consult Requested by: PMR Staff  Reason for Consult: Medical co-management    Assessment & Plan   Eduardo Kemp is a 75 year old male admitted on 4/27/2020 to [facility]. He has a past medical history of BPH, hypertension and a recent month long hospital stay at [facility] for COVID-19 and pneumonia. Sine admission to ARU, he has been doing very well with no acute issues. He was able to wean off droplet precautions and is still using oxygen with a moderate amount of accessory muscle use while doing therapies, but is improving daily and doing great.     1. ARDS secondary to COVID-19 and pneumonia, resolved  2. Hypoxic respiratory failure secondary to #2, resolving  -He is still on 1-2L nasal canula to maintain O2>90%. He does not feel short of breath at rest but definitely feels limited when he is walking and is still using flucuating levels of oxygen.   -Home oxygen planning should start a few days before he discharges home.     3. BPH  -Continue home regimen of Flomax 0.4mg once daily    4. Hypertension  -Stop Lasix. Continue home hydrochlorothiazide 12.5mg once daily      **We will sign off at this time. PMR team is managing his care and we can be reached 24hr per day with any acute questions or concerns.**      MIKE Vargas  [facility]    Physical Exam   Vital Signs: Temp: 96.6  F (35.9  C) Temp src: Oral BP: 122/70 Pulse: 72   Resp: 18 SpO2: 90 % O2 Device: Nasal cannula Oxygen Delivery: 3 LPM  Weight: 199 lbs 9.6 oz    General Appearance: 75 year old gentleman resting comfortably in bed, no acute  Respiratory: mildly increased respiratory effort on 2L nasal canula, distant breath sounds throughout bilateral lung fields, no wheezing auscultated bialterally   Cardiovascular: regular rate and rhythm, no appreciable murmurs, rubs or gallops  Skin:  warm, dry, no open sores, lesions or ulcerations  Psychiatric: alert, oriented to name, hospital, date and recent events, great historian    Lab Results   Component Value Date    WBC 6.0 05/01/2020     Lab Results   Component Value Date    RBC 3.93 05/01/2020     Lab Results   Component Value Date    HGB 11.9 05/01/2020     Lab Results   Component Value Date    HCT 36.5 05/01/2020     Lab Results   Component Value Date    MCV 93 05/01/2020     Lab Results   Component Value Date    MCH 30.3 05/01/2020     Lab Results   Component Value Date    MCHC 32.6 05/01/2020     Lab Results   Component Value Date    RDW 14.0 05/01/2020     Lab Results   Component Value Date     05/01/2020     Last Comprehensive Metabolic Panel:  Sodium   Date Value Ref Range Status   05/01/2020 137 133 - 144 mmol/L Final     Potassium   Date Value Ref Range Status   05/01/2020 3.6 3.4 - 5.3 mmol/L Final     Chloride   Date Value Ref Range Status   05/01/2020 104 94 - 109 mmol/L Final     Carbon Dioxide   Date Value Ref Range Status   05/01/2020 27 20 - 32 mmol/L Final     Anion Gap   Date Value Ref Range Status   05/01/2020 6 3 - 14 mmol/L Final     Glucose   Date Value Ref Range Status   05/01/2020 88 70 - 99 mg/dL Final     Urea Nitrogen   Date Value Ref Range Status   05/01/2020 13 7 - 30 mg/dL Final     Creatinine   Date Value Ref Range Status   05/01/2020 0.85 0.66 - 1.25 mg/dL Final     GFR Estimate   Date Value Ref Range Status   05/01/2020 85 >60 mL/min/[1.73_m2] Final     Comment:     Non  GFR Calc  Starting 12/18/2018, serum creatinine based estimated GFR (eGFR) will be   calculated using the Chronic Kidney Disease Epidemiology Collaboration   (CKD-EPI) equation.       Calcium   Date Value Ref Range Status   05/01/2020 9.0 8.5 - 10.1 mg/dL Final

## 2020-05-01 NOTE — PROGRESS NOTES
"  [facility]   Acute Rehabilitation Unit  Daily progress note    INTERVAL HISTORY  Eduardo Kemp was seen and examined at bedside.     No acute events overnight.  He slept very well for the whole night.  He continues to have symptomatic O2 desaturation during exertion relieved by rest and increasing oxygen. He denies worsening of his cough, any fever/chills, or feelings of dizziness or fatigue.  He denies constipation or urinary issues.    Functionally, he is still desaturating during therapy, though he can participate with increased supplemental oxygen.    MEDICATIONS  Scheduled meds    amLODIPine  5 mg Oral Daily     enoxaparin ANTICOAGULANT  30 mg Subcutaneous Q12H     hydrochlorothiazide  12.5 mg Oral Daily     multivitamin w/minerals  1 tablet Oral Daily     sodium chloride (PF)  3 mL Intracatheter Q8H     tamsulosin  0.4 mg Oral At Bedtime       PRN meds:  acetaminophen, melatonin, polyethylene glycol, psyllium, senna-docusate, sodium chloride (PF)    PHYSICAL EXAM  /70 (BP Location: Left arm)   Pulse 72   Temp 96.6  F (35.9  C) (Oral)   Resp 18   Ht 1.727 m (5' 8\")   Wt 90.5 kg (199 lb 9.6 oz)   SpO2 90%   BMI 30.35 kg/m    Gen: sitting up in bed, no acute distress  HEENT: no conjunctival injection, normocephalic, no rhinorrhea, neck supple  Cardio: regular rate and rhythm, no murmurs  Pulm: good air entry bilaterally, R>L rales at base, no wheezes or rhonchi.  Wet cough.  Abd: Active bowel sounds, nontender, soft, nondistended  Ext: no overt pretibial edema, warm and well perfused  Neuro/MSK:  Alert, follows commands, speech fluent and comprehensible, conjugate gaze, no facial asymmetry. Moves upper and lower extremities against gravity and resistance.     LABS  CBC:   Recent Labs   Lab Test 05/01/20  0542   WBC 6.0   RBC 3.93*   HGB 11.9*   MCV 93   MCH 30.3   MCHC 32.6   RDW 14.0        BMP:   Recent Labs   Lab Test 05/01/20  0542    "   POTASSIUM 3.6   CHLORIDE 104   CO2 27   BUN 13   CR 0.85   GLC 88     ASSESSMENT AND PLAN  Eduardo Kemp is a(n) 75 year old yo male with a past medical history of HTN, HLD, and BPH who was previously admitted for SARS-CoV-2 positive pneumonia.  His date of symptom onset is estimated to be 03/25/20 with fever and cough, which is also when he was confirmed positive with testing.  He recently traveled from [place] and his wife contracted a milder case of SARS-CoV-2.  His hospital course was complicated by a prolonged ICU stay for ARDS with intubation for 2 weeks with a secondary bacterial pneumonia.  He lost 40lbs during this admission. He has post-ICU weakness and dyspnea and O2 desaturation on mild exertion.  He continued to stabilize and was admitted to the ARU on 4/27/20.     Rehab  #Deconditioning  He endorsed global weakness on admission.  His goal is to achieve modified independence at home by discharge.  Barriers to discharge include clarification of his therapy needs, supplemental oxygen requirements, and training of his family support system.  - ADL: OT  - Mobility: PT  - Adjustment to disability: Clinical Psych  - Follow up neuropsych testing.     #Sleep  #Metabolic Encephalopathy (resolved)  His previous hospital course was complicated by ICU related delirium requiring seroquel.  He has not required seroquel since admission and has slept well without the use of medication.  Seroquel was discontinued.  He had no additional events of confusion during his rehab stay, and his sleep issues improved greatly with melatonin and sleep hygiene.  - melatonin 3mg prn  - encourage good sleep hygiene      #Bowel: he had regular bowel movements during his hospital stay.  - prn bowel medications  - prn fiber     #Bladder  #Benign Prostatic Hypertrophy  - PTA flomax 0.4 daily  - monitor     Pulm  #SARS-CoV-2 Pneumonia  #Hopsital Acquired Pneumonia  He completed a Remdesivir trial (3/25-4/3) and required proning  during weaning of ventilation.  D-dimer was elevated on 4/4.  He was treated for secondary HAP with one week of zosyn and azithromycin.  COVID testing on 4/22 was positive, but overall his pulmonary and associated clinical status improved to discharge.  He completed a 30 day course of post-covid anticoagulation.  No current signs of PE.  He had two negative COVID-19 tests since 4/29/2020, though he still has respiratory symptoms.  As his respiratory symptoms improved, he was cleared of droplet precautions on 4/30/2020.  He continued to require supplemental oxygen at rest with more in therapy, jak of 81% during exertion.  - 5L Supplemental O2 with goal above 92.  Extra supplemental oxygen during therapies  - Medicine is following, appreciate recs  - If he develops wheezing, consider albuterol MDI  - Will likely require supplemental oxygen at home, to be followed up by PCP and PM&R  - Explore pulse oximetry at home feasibility     Cardiac  #Hypertension  His blood pressure has hovered around 100-110/60-70 during admission.  - Discontinued lasix per medicine  - Hydrochlorothiazide 12.5mg daily  - amlodipine 5mg daily, verified to continue per medicine  - medicine has signed off     #Hyperlipidemia: stable     FEN/GI  #Diet: Regular, thin liquids     Ppx  #GI: none  #DVT: Enoxaparin     Dispo  Code: Full code (verified with patient)  Disposition: To home with family  ELOS: 7 days from 4/27, Estimated discharge date 05/05/2020  Rehab Prognosis: Good  #Follow up:  - Primary care provider in 1-2 weeks, likely telemed.  Consider close follow up for any decompensation at home.  - PM&R in 4 months  - Home PT/OT    Chris Hernandes MD  Physical Medicine & Rehabilitation    Staffed with Dr. Velez

## 2020-05-01 NOTE — PLAN OF CARE
FOCUS/GOAL  Medication management and Medical management     ASSESSMENT, INTERVENTIONS AND CONTINUING PLAN FOR GOAL:  A&O, Mod I in room w/ walker. Makes needs known, alarms off.  Pt denied pain during evening.  Continent of B/B, LBM 4/30 during day shift per report.  On 3L O2 via nasal cannula, L PIV removed. Continue with POC.

## 2020-05-01 NOTE — PLAN OF CARE
OT: continued to focus on incorporation of energy conservation techniques during ADLs, required SBA during full seated shower to initiate rest breaks to recover when SOB especially when reaching to BLE. Pt receptive to techniques but does not always notice when he is SOB. Need to continue challenging cardiopulmonary endurance and breathing mechanics and issue energy conservation handouts to increase independence prior to discharge on 5/5

## 2020-05-01 NOTE — PLAN OF CARE
FOCUS/GOAL  Nutrition/Feeding/Swallowing precautions, Medical management, and Mobility    ASSESSMENT, INTERVENTIONS AND CONTINUING PLAN FOR GOAL:  Alert and oriented x4, continent of B/B, modified independence in the room. Baseline o2 set at 3 LPM, o2 is increased with activity. LBM today. Pt ate 100% of both meals this shift. VSS, denied pain and SOB. Call light within reach, pt using appropriately.

## 2020-05-01 NOTE — PLAN OF CARE
Discharge Planner PT   Patient plan for discharge: home with intermittent assist from wife and HH PT. Pt ordered 4WW from [facility] on Thursday, supposed to arrive Monday. If it does not[facility]  Current status: mod I in room with 4WW, able to manage O2 tubing in room without assist.  Barriers to return to prior living situation: SOB, endurance deficits  Recommendations for discharge: continue to progress strength, endurance.  Rationale for recommendations: to improve independence with amb distances longer than home distances with 4WW.       Entered by: Denzel Ag 05/01/2020 4:31 PM

## 2020-05-01 NOTE — PLAN OF CARE
Pt slept through the night. 3L via NC, ALVAREZ. Mod I in room with walker. Continent B&B, LB 4/30. Combo/reg, thin. Call light within reach. Continue to monitor.

## 2020-05-02 ENCOUNTER — APPOINTMENT (OUTPATIENT)
Dept: PHYSICAL THERAPY | Facility: CLINIC | Age: 75
End: 2020-05-02
Payer: MEDICARE

## 2020-05-02 ENCOUNTER — APPOINTMENT (OUTPATIENT)
Dept: OCCUPATIONAL THERAPY | Facility: CLINIC | Age: 75
End: 2020-05-02
Payer: MEDICARE

## 2020-05-02 PROCEDURE — 97535 SELF CARE MNGMENT TRAINING: CPT | Mod: GO | Performed by: OCCUPATIONAL THERAPIST

## 2020-05-02 PROCEDURE — 12800006 ZZH R&B REHAB

## 2020-05-02 PROCEDURE — 97530 THERAPEUTIC ACTIVITIES: CPT | Mod: GO | Performed by: OCCUPATIONAL THERAPIST

## 2020-05-02 PROCEDURE — 97110 THERAPEUTIC EXERCISES: CPT | Mod: GO | Performed by: OCCUPATIONAL THERAPIST

## 2020-05-02 PROCEDURE — 25000132 ZZH RX MED GY IP 250 OP 250 PS 637: Mod: GY | Performed by: PHYSICAL MEDICINE & REHABILITATION

## 2020-05-02 PROCEDURE — 97110 THERAPEUTIC EXERCISES: CPT | Mod: GP | Performed by: PHYSICAL THERAPIST

## 2020-05-02 RX ADMIN — MULTIPLE VITAMINS W/ MINERALS TAB 1 TABLET: TAB at 07:41

## 2020-05-02 RX ADMIN — TAMSULOSIN HYDROCHLORIDE 0.4 MG: 0.4 CAPSULE ORAL at 22:46

## 2020-05-02 RX ADMIN — POLYETHYLENE GLYCOL 3350 17 G: 17 POWDER, FOR SOLUTION ORAL at 22:48

## 2020-05-02 NOTE — PLAN OF CARE
FOCUS/GOAL  Medical management    ASSESSMENT, INTERVENTIONS AND CONTINUING PLAN FOR GOAL:  Pt WILVER in his room. Used walker for ambulation to the BR. Independent w/ bed mobility. No complain of pain. Continue to use 02 at 3L/nc, sat of 98%. No complain of pain. Slept well.

## 2020-05-02 NOTE — PLAN OF CARE
Patient is Alert and Oriented x4, able to make needs known. Denies pain. VSS. WILVER in room using walker. Continent of bowel and bladder. LBM 4/30/20. Patient is on 3L of O2 currently, titrated down to 2.5 LPM but was sating around 92% so brought it back up to 3L, sating at 94%. VSS. Continue with POC.

## 2020-05-02 NOTE — PLAN OF CARE
Denies pain, continent of bowel and bladder on the toilet. Fine crackles auscultated on the RUL, has intermittent non productive  cough C&D encouraged, o2 # 96% @ 2L. BP before breakfast 92/61,denies headache or dizziness, fluids encouraged, rechecked 107/69. Patient is issac in his room and, lovenox discontinued today. Patient ate good at breakfast and lunch, will continue POC

## 2020-05-02 NOTE — PROGRESS NOTES
"[facility]   Acute Rehabilitation Progress Note  _______________________________________________________  Patient: Eduardo Kemp   : 1945   MRN: [id number]   _______________________________________________________    Admission Date:  2020   Date of Service: 2020    CC:   10.9 Other Pulmonary: Acute hypoxic respiratory failure and ARDS  COVID-19  Debility     INTERVAL HISTORY:    No acute events overnight.  Patient reports that he is tolerating therapies well although that he still gets short of breath when he is working hard.  Used 3 L nasal cannula overnight.  Denies any new pain.  No headaches, subjective fever.  Continues to keep his spirits high and talks to his family almost every day on a cell phone.  _________________________________________________    OBJECTIVE:    Physical Exam:  /69 (BP Location: Left arm)   Pulse 81   Temp 98  F (36.7  C) (Oral)   Resp 20   Ht 1.727 m (5' 8\")   Wt 90.5 kg (199 lb 9.6 oz)   SpO2 96%   BMI 30.35 kg/m    GEN:  No acute distress, laying supine in bed  HEENT: Nasal cannula in place, moist mucous membrane, conjugate gaze  CV:  Breathing room air comfortably  ABD:  Nontender, nondistended  EXT:  No edema in bilateral lower extremities  SKIN:  No bruises, rashes, breakdown were visualized in the exposed upper and lower extremities  NEURO/MSK: Follows commands easily.  Pleasant and logical in conversation.  Understands his medical course well.  Able to move all 4 limbs spontaneously.  _________________________________________________  Pertinent Labs/Imaging:  No new pertinent labs.   No new pertinent imaging.   _________________________________________________    ASSESSMENT:    Eduardo Kemp is a(n) 75 year old yo male with a past medical history of HTN, HLD, and BPH who was previously admitted for SARS-CoV-2 positive pneumonia.  His date of symptom onset is estimated to be 20 with fever and cough, which " is also when he was confirmed positive with testing.  He recently traveled from [place] and his wife contracted a milder case of SARS-CoV-2.  His hospital course was complicated by a prolonged ICU stay for ARDS with intubation for 2 weeks with a secondary bacterial pneumonia.  He lost 40lbs during this admission. He has post-ICU weakness and dyspnea and O2 desaturation on mild exertion.  He continued to stabilize and was admitted to the ARU on 4/27/20.    PLAN:    -Comanaged with internal medicine.  -Lovenox stopped today due to improvement of ambulation  -Vitals stable and appropriate over the last 24 hrs.   -Continues to require 3 L/min nasal cannula.  Increased oxygen demand with therapies but tolerating well.  -Continue ongoing medical management.  -Continue therapies and plan of care.    Patient was staffed with Dr. James Hill DO on 5/2/2020 11:37 AM   PGY 4  Physical Medicine and Rehabilitation

## 2020-05-03 ENCOUNTER — APPOINTMENT (OUTPATIENT)
Dept: PHYSICAL THERAPY | Facility: CLINIC | Age: 75
End: 2020-05-03
Payer: MEDICARE

## 2020-05-03 ENCOUNTER — APPOINTMENT (OUTPATIENT)
Dept: OCCUPATIONAL THERAPY | Facility: CLINIC | Age: 75
End: 2020-05-03
Payer: MEDICARE

## 2020-05-03 PROCEDURE — 25000132 ZZH RX MED GY IP 250 OP 250 PS 637: Mod: GY | Performed by: PHYSICAL MEDICINE & REHABILITATION

## 2020-05-03 PROCEDURE — 97110 THERAPEUTIC EXERCISES: CPT | Mod: GO | Performed by: OCCUPATIONAL THERAPIST

## 2020-05-03 PROCEDURE — 97110 THERAPEUTIC EXERCISES: CPT | Mod: GP | Performed by: PHYSICAL THERAPIST

## 2020-05-03 PROCEDURE — 25000132 ZZH RX MED GY IP 250 OP 250 PS 637: Mod: GY | Performed by: STUDENT IN AN ORGANIZED HEALTH CARE EDUCATION/TRAINING PROGRAM

## 2020-05-03 PROCEDURE — 97535 SELF CARE MNGMENT TRAINING: CPT | Mod: GO | Performed by: OCCUPATIONAL THERAPIST

## 2020-05-03 PROCEDURE — 12800006 ZZH R&B REHAB

## 2020-05-03 RX ORDER — SENNOSIDES 8.6 MG
2 TABLET ORAL DAILY
Status: DISCONTINUED | OUTPATIENT
Start: 2020-05-03 | End: 2020-05-05 | Stop reason: HOSPADM

## 2020-05-03 RX ORDER — BISACODYL 10 MG
10 SUPPOSITORY, RECTAL RECTAL DAILY PRN
Status: DISCONTINUED | OUTPATIENT
Start: 2020-05-03 | End: 2020-05-05 | Stop reason: HOSPADM

## 2020-05-03 RX ORDER — DOCUSATE SODIUM 100 MG/1
100 CAPSULE, LIQUID FILLED ORAL 3 TIMES DAILY
Status: DISCONTINUED | OUTPATIENT
Start: 2020-05-03 | End: 2020-05-05 | Stop reason: HOSPADM

## 2020-05-03 RX ADMIN — DOCUSATE SODIUM 100 MG: 100 CAPSULE, LIQUID FILLED ORAL at 21:56

## 2020-05-03 RX ADMIN — HYDROCHLOROTHIAZIDE 12.5 MG: 12.5 CAPSULE ORAL at 07:37

## 2020-05-03 RX ADMIN — DOCUSATE SODIUM 100 MG: 100 CAPSULE, LIQUID FILLED ORAL at 14:20

## 2020-05-03 RX ADMIN — SENNOSIDES 2 TABLET: 8.6 TABLET, FILM COATED ORAL at 11:19

## 2020-05-03 RX ADMIN — TAMSULOSIN HYDROCHLORIDE 0.4 MG: 0.4 CAPSULE ORAL at 21:56

## 2020-05-03 RX ADMIN — MULTIPLE VITAMINS W/ MINERALS TAB 1 TABLET: TAB at 07:37

## 2020-05-03 RX ADMIN — AMLODIPINE BESYLATE 5 MG: 5 TABLET ORAL at 07:37

## 2020-05-03 NOTE — PLAN OF CARE
Discharge Planner PT   Patient plan for discharge: home 5/5  Current status: ind in room w/ or w/o walker  Barriers to return to prior living situation: none  Recommendations for discharge: has 4ww on order from [facility], should not need 2ww for any interim use if it does not arrive on time  Rationale for recommendations: pt presentation       Entered by: Stacia Manzo 05/03/2020 4:24 PM

## 2020-05-03 NOTE — PROGRESS NOTES
"[facility]   Acute Rehabilitation Progress Note  _______________________________________________________  Patient: Eduardo Kemp   : 1945   MRN: [id number]   _______________________________________________________    Admission Date:  2020   Date of Service: 5/3/2020    CC:   10.9 Other Pulmonary: Acute hypoxic respiratory failure and ARDS 2/2 COVID-19  Debility     INTERVAL HISTORY:    No acute events overnight.  Patient continues to require 3 L of nasal cannula oxygen.  Denies any new pain, increased breathing requirements, chest pain, or any abdominal pain.  He continues to have high spirits and participates well in therapies.  _________________________________________________    OBJECTIVE:    Physical Exam:  /65 (BP Location: Left arm)   Pulse 75   Temp 97.8  F (36.6  C) (Oral)   Resp 20   Ht 1.727 m (5' 8\")   Wt 90.5 kg (199 lb 9.6 oz)   SpO2 94%   BMI 30.35 kg/m    GEN:  No acute distress, laying supine in bed  HEENT: Conjugate gaze, moist mucous membranes, no scleral injection, no rhinorrhea  CV:  3 L nasal cannula in place, patient acyanotic  ABD:  Nontender, nondistended  EXT:  No edema in bilateral lower extremities  SKIN:  No bruises, rashes, breakdown were visualized  NEURO/MSK: Alert and appropriate in conversation.  Remembers me from yesterday.  No dysarthria noted.  Moves all 4 extremities with at least 4/5 strength.  _________________________________________________  Pertinent Labs/Imaging:  No new pertinent labs.   No new pertinent imaging.   _________________________________________________    ASSESSMENT:    Eduardo Kemp is a(n) 75 year old yo male with a past medical history of HTN, HLD, and BPH who was previously admitted for SARS-CoV-2 positive pneumonia.  His date of symptom onset is estimated to be 20 with fever and cough, which is also when he was confirmed positive with testing.  He recently traveled from [place] and " his wife contracted a milder case of SARS-CoV-2.  His hospital course was complicated by a prolonged ICU stay for ARDS with intubation for 2 weeks with a secondary bacterial pneumonia.  He lost 40lbs during this admission. He has post-ICU weakness and dyspnea and O2 desaturation on mild exertion.  He continued to stabilize and was admitted to the ARU on 4/27/20.     PLAN:     -Comanaged with internal medicine.  -Lovenox stopped 5/2/20 due to improvement of ambulation  -Vitals stable and appropriate over the last 24 hrs.   -Continues to require 3 L/min nasal cannula.  Increased oxygen demand with therapies but tolerating well.  -Continue ongoing medical management.  -Continue therapies and plan of care.     Patient was staffed with Dr. James Hill DO on 5/3/2020 11:53 AM   PGY 4  Physical Medicine and Rehabilitation

## 2020-05-03 NOTE — PLAN OF CARE
Denies pain, no large or medium BM for 3 days, passing gas,  started on schedule senna and colace. Denies sob at rest, had non productive cough intermittent cough, fine crackles auscultated on  the right anterior lobe, encouraged to use the IS with good effect. Patient is on O2 @3l sats in the upper 90's, Ate good at breakfast and lunch, will continue POC   2

## 2020-05-03 NOTE — PROGRESS NOTES
OT: Pt continues to require 3L of supplemental oxygen with activity. Pt's O2 decreases into the mid 80's with activity and pt experiences tachycardia with activity as well. Pt demo IND with light home mgmt tasks and meal preparation tasks while on 3L, but standing tolerance is limited by oxygen decreasing and pt becoming tachycardic. Pt continues to only tolerate standing activity for 1-2 minutes but recovers well with seated rest breaks.

## 2020-05-03 NOTE — PLAN OF CARE
FOCUS/GOAL  Medical management    ASSESSMENT, INTERVENTIONS AND CONTINUING PLAN FOR GOAL:  Pt is alert and oriented. No complaints of pain. Mod I in room with walker. Continent of bladder. O2 WDL upon spot check. On 3 L O2 NC. Appeared to sleep well overnight.

## 2020-05-04 ENCOUNTER — APPOINTMENT (OUTPATIENT)
Dept: PHYSICAL THERAPY | Facility: CLINIC | Age: 75
End: 2020-05-04
Payer: MEDICARE

## 2020-05-04 ENCOUNTER — DOCUMENTATION ONLY (OUTPATIENT)
Dept: OCCUPATIONAL THERAPY | Facility: CLINIC | Age: 75
End: 2020-05-04

## 2020-05-04 ENCOUNTER — APPOINTMENT (OUTPATIENT)
Dept: OCCUPATIONAL THERAPY | Facility: CLINIC | Age: 75
End: 2020-05-04
Payer: MEDICARE

## 2020-05-04 PROCEDURE — 97530 THERAPEUTIC ACTIVITIES: CPT | Mod: GO | Performed by: OCCUPATIONAL THERAPIST

## 2020-05-04 PROCEDURE — 25000132 ZZH RX MED GY IP 250 OP 250 PS 637: Mod: GY | Performed by: PHYSICAL MEDICINE & REHABILITATION

## 2020-05-04 PROCEDURE — 12800006 ZZH R&B REHAB

## 2020-05-04 PROCEDURE — 25000132 ZZH RX MED GY IP 250 OP 250 PS 637: Mod: GY | Performed by: STUDENT IN AN ORGANIZED HEALTH CARE EDUCATION/TRAINING PROGRAM

## 2020-05-04 PROCEDURE — 97110 THERAPEUTIC EXERCISES: CPT | Mod: GP

## 2020-05-04 PROCEDURE — 97535 SELF CARE MNGMENT TRAINING: CPT | Mod: GO | Performed by: OCCUPATIONAL THERAPIST

## 2020-05-04 PROCEDURE — 97110 THERAPEUTIC EXERCISES: CPT | Mod: GO | Performed by: OCCUPATIONAL THERAPIST

## 2020-05-04 PROCEDURE — 97530 THERAPEUTIC ACTIVITIES: CPT | Mod: GP

## 2020-05-04 RX ORDER — LANOLIN ALCOHOL/MO/W.PET/CERES
3 CREAM (GRAM) TOPICAL
Qty: 30 TABLET | Refills: 0 | Status: SHIPPED | OUTPATIENT
Start: 2020-05-04 | End: 2020-05-04

## 2020-05-04 RX ORDER — TAMSULOSIN HYDROCHLORIDE 0.4 MG/1
0.4 CAPSULE ORAL AT BEDTIME
Qty: 30 CAPSULE | Refills: 0 | Status: SHIPPED | OUTPATIENT
Start: 2020-05-04

## 2020-05-04 RX ORDER — TAMSULOSIN HYDROCHLORIDE 0.4 MG/1
0.4 CAPSULE ORAL AT BEDTIME
Qty: 30 CAPSULE | Refills: 0 | Status: SHIPPED | OUTPATIENT
Start: 2020-05-04 | End: 2020-05-04

## 2020-05-04 RX ORDER — POLYETHYLENE GLYCOL 3350 17 G/17G
17 POWDER, FOR SOLUTION ORAL DAILY PRN
Qty: 30 PACKET | Refills: 0 | Status: SHIPPED | OUTPATIENT
Start: 2020-05-04 | End: 2020-09-18

## 2020-05-04 RX ORDER — HYDROCHLOROTHIAZIDE 12.5 MG/1
12.5 CAPSULE ORAL DAILY
Qty: 30 CAPSULE | Refills: 0 | Status: SHIPPED | OUTPATIENT
Start: 2020-05-05 | End: 2020-05-04

## 2020-05-04 RX ORDER — AMLODIPINE BESYLATE 5 MG/1
5 TABLET ORAL DAILY
Qty: 30 TABLET | Refills: 0 | Status: SHIPPED | OUTPATIENT
Start: 2020-05-05 | End: 2020-05-04

## 2020-05-04 RX ORDER — MULTIPLE VITAMINS W/ MINERALS TAB 9MG-400MCG
1 TAB ORAL DAILY
Qty: 30 TABLET | Refills: 0 | Status: SHIPPED | OUTPATIENT
Start: 2020-05-05

## 2020-05-04 RX ORDER — SENNOSIDES 8.6 MG
2 TABLET ORAL DAILY
Qty: 60 TABLET | Refills: 0 | Status: SHIPPED | OUTPATIENT
Start: 2020-05-05 | End: 2020-05-04

## 2020-05-04 RX ORDER — MULTIPLE VITAMINS W/ MINERALS TAB 9MG-400MCG
1 TAB ORAL DAILY
Qty: 30 TABLET | Refills: 0 | Status: SHIPPED | OUTPATIENT
Start: 2020-05-05 | End: 2020-05-04

## 2020-05-04 RX ORDER — LANOLIN ALCOHOL/MO/W.PET/CERES
3 CREAM (GRAM) TOPICAL
Qty: 30 TABLET | Refills: 0 | Status: SHIPPED | OUTPATIENT
Start: 2020-05-04 | End: 2020-09-18

## 2020-05-04 RX ORDER — DOCUSATE SODIUM 100 MG/1
100 CAPSULE, LIQUID FILLED ORAL 3 TIMES DAILY
Qty: 90 CAPSULE | Refills: 0 | Status: SHIPPED | OUTPATIENT
Start: 2020-05-04 | End: 2020-12-16

## 2020-05-04 RX ORDER — SENNOSIDES 8.6 MG
2 TABLET ORAL DAILY
Qty: 60 TABLET | Refills: 0 | Status: SHIPPED | OUTPATIENT
Start: 2020-05-05 | End: 2020-12-16

## 2020-05-04 RX ORDER — DOCUSATE SODIUM 100 MG/1
100 CAPSULE, LIQUID FILLED ORAL 3 TIMES DAILY
Qty: 90 CAPSULE | Refills: 0 | Status: SHIPPED | OUTPATIENT
Start: 2020-05-04 | End: 2020-05-04

## 2020-05-04 RX ORDER — AMLODIPINE BESYLATE 5 MG/1
5 TABLET ORAL DAILY
Qty: 30 TABLET | Refills: 0 | Status: SHIPPED | OUTPATIENT
Start: 2020-05-05

## 2020-05-04 RX ORDER — POLYETHYLENE GLYCOL 3350 17 G/17G
17 POWDER, FOR SOLUTION ORAL DAILY PRN
Qty: 30 PACKET | Refills: 0 | Status: SHIPPED | OUTPATIENT
Start: 2020-05-04 | End: 2020-05-04

## 2020-05-04 RX ORDER — HYDROCHLOROTHIAZIDE 12.5 MG/1
12.5 CAPSULE ORAL DAILY
Qty: 30 CAPSULE | Refills: 0 | Status: SHIPPED | OUTPATIENT
Start: 2020-05-05

## 2020-05-04 RX ADMIN — AMLODIPINE BESYLATE 5 MG: 5 TABLET ORAL at 09:15

## 2020-05-04 RX ADMIN — DOCUSATE SODIUM 100 MG: 100 CAPSULE, LIQUID FILLED ORAL at 09:15

## 2020-05-04 RX ADMIN — HYDROCHLOROTHIAZIDE 12.5 MG: 12.5 CAPSULE ORAL at 09:16

## 2020-05-04 RX ADMIN — TAMSULOSIN HYDROCHLORIDE 0.4 MG: 0.4 CAPSULE ORAL at 20:26

## 2020-05-04 RX ADMIN — SENNOSIDES 2 TABLET: 8.6 TABLET, FILM COATED ORAL at 09:15

## 2020-05-04 RX ADMIN — MULTIPLE VITAMINS W/ MINERALS TAB 1 TABLET: TAB at 09:15

## 2020-05-04 NOTE — PROGRESS NOTES
Assessed pt's need for oxygen for home use    At rest on RA: 90% and IL at 96  At rest on 3L of O2: 91% and IL at 91    With activity on RA: 81% and   With activity on 3L of O2: 88% and

## 2020-05-04 NOTE — PROGRESS NOTES
"  [facility]   Acute Rehabilitation Unit  Daily progress note    INTERVAL HISTORY  Eduardo Kemp was seen and examined at bedside.     No acute events overnight. He slept well last night.  He had some constipation over the weekend that responded today to bowel meds.  He continues to require 3L of oxygen at rest and 4L with exercise.  He denies worsening shortness of breath, worsening cough, or fever/chills.    Functionally, he is participating well in therapies but requires additional supplemental oxygen with standing exercises.    MEDICATIONS  Scheduled meds    amLODIPine  5 mg Oral Daily     docusate sodium  100 mg Oral TID     hydrochlorothiazide  12.5 mg Oral Daily     multivitamin w/minerals  1 tablet Oral Daily     sennosides  2 tablet Oral Daily     tamsulosin  0.4 mg Oral At Bedtime       PRN meds:  acetaminophen, bisacodyl, melatonin, polyethylene glycol, psyllium    PHYSICAL EXAM  /60 (BP Location: Left arm)   Pulse 73   Temp 96.4  F (35.8  C) (Oral)   Resp 18   Ht 1.727 m (5' 8\")   Wt 90.5 kg (199 lb 9.6 oz)   SpO2 96%   BMI 30.35 kg/m    Gen: Sitting in bed, no acute distress  HEENT: no conjunctival injection, normocephalic, no rhinorrhea, neck supple  Cardio: regular rate and rhythm, no murmurs  Pulm: Good air entry better in right than left.  Rales heard more clearly in right than left lung bases.  Abd: Active bowel sounds, nontender, soft, nondistended  Ext: no pretibial edema, warm and well perfused  Neuro/MSK:  Alert, follows commands, speech fluent and comprehensible, conjugate gaze, no facial asymmetry, moves upper and lower extremities volitionally against gravity and resistance.    LABS  No recent labs.    ASSESSMENT AND PLAN  Eduardo Kemp is a(n) 75 year old yo male with a past medical history of HTN, HLD, and BPH who was previously admitted for SARS-CoV-2 positive pneumonia.  His date of symptom onset is estimated to be 03/25/20 with fever " and cough, which is also when he was confirmed positive with testing.  He recently traveled from [place] and his wife contracted a milder case of SARS-CoV-2.  His hospital course was complicated by a prolonged ICU stay for ARDS with intubation for 2 weeks with a secondary bacterial pneumonia.  He lost 40lbs during this admission. He has post-ICU weakness and dyspnea and O2 desaturation on mild exertion.  He continued to stabilize and was admitted to the ARU on 4/27/20.     Rehab  #Deconditioning  He endorsed global weakness on admission.  His goal is to achieve modified independence at home by discharge.  Barriers to discharge include clarification of his therapy needs, supplemental oxygen requirements, and training of his family support system.  Neuropsych testing performed on 04/29/20 showed evidence of mild cognitive impairment, but his participation in therapy and day-to-day function have been adequate, suggesting a slight effect on practical cognitive function.  Further assessment is warranted outpatient.  - ADL: OT  - Mobility: PT  - Adjustment to disability: Clinical Psych  - Instruct OT on discharge: follow up cognitive assessments and determine any potential need for SLP.  ------  Oxygen Documentation:   I certify that this patient, Eduardo Kemp has been under my care (or a nurse practitioner or physican's assistant working with me). This is the face-to-face encounter for oxygen medical necessity.      Eduardo Kemp is now in a chronic stable state and continues to require supplemental oxygen. Patient has continued oxygen desaturation due to post-acute COVID-19 related ARDS.    Alternative treatment(s) tried or considered and deemed clinically infective for treatment of post-acute COVID-19 related ARDS include nebulizers, inhalers and pulmonary rehab.  If portability is ordered, is the patient mobile within the home? yes    Patients who qualify for home O2 coverage under the CMS guidelines require  ABG tests or O2 sat readings obtained closest to, but no earlier than 2 days prior to the discharge, as evidence of the need for home oxygen therapy. Testing must be performed while patient is in the chronic stable state. See notes for O2 sats.  -------     #Sleep  #Metabolic Encephalopathy (resolved)  His previous hospital course was complicated by ICU related delirium requiring seroquel.  He has not required seroquel since admission and has slept well without the use of medication.  Seroquel was discontinued.  He had no additional events of confusion during his rehab stay, and his sleep issues improved greatly with melatonin and sleep hygiene.  - melatonin 3mg prn  - encourage good sleep hygiene      #Bowel: he had regular bowel movements during his hospital stay.  - prn bowel medications  - prn fiber     #Bladder  #Benign Prostatic Hypertrophy  - PTA flomax 0.4 daily  - monitor     Pulm  #SARS-CoV-2 Pneumonia  #Hopsital Acquired Pneumonia  He completed a Remdesivir trial (3/25-4/3) and required proning during weaning of ventilation.  D-dimer was elevated on 4/4.  He was treated for secondary HAP with one week of zosyn and azithromycin.  COVID testing on 4/22 was positive, but overall his pulmonary and associated clinical status improved to discharge.  He completed a 30 day course of post-covid anticoagulation.  No current signs of PE.  He had two negative COVID-19 tests since 4/29/2020, though he still has respiratory symptoms.  As his respiratory symptoms improved, he was cleared of droplet precautions on 4/30/2020.  He continued to require supplemental oxygen at rest with more in therapy, jak of 81% during exertion.  - 5L Supplemental O2 with goal above 92.  Extra supplemental oxygen during therapies  - Medicine is following, appreciate recs  - If he develops wheezing, consider albuterol MDI  - home O2 ordered  - Pulse oximetry will be delivered     Cardiac  #Hypertension  His blood pressure has hovered  around 100-110/60-70 during admission.  He had to skip prior to admission blood pressure medications while inpatient, likely secondary to his weight loss.  - Discontinued lasix per medicine  - Hydrochlorothiazide 12.5mg daily  - amlodipine 5mg daily, verified to continue per medicine  - medicine has signed off     #Hyperlipidemia: stable     FEN/GI  #Diet: Regular, thin liquids     Ppx  #GI: none  #DVT: Enoxaparin     Dispo  Code: Full code (verified with patient)  Disposition: To home with family  ELOS: 7 days from 4/27, Estimated discharge date 05/05/2020  Rehab Prognosis: Good  #Follow up:  - Primary care provider in 1-2 weeks, likely telemed.  Consider close follow up for any decompensation at home.  - PM&R in 4 months  - Home PT/OT    Chris Hernandes MD  Physical Medicine & Rehabilitation    Staffed with Dr. Velez.

## 2020-05-04 NOTE — PROGRESS NOTES
Physical Therapy Discharge Summary    Reason for therapy discharge:    Discharged to home with home therapy.    Progress towards therapy goal(s). See goals on Care Plan in [facility] electronic health record for goal details.  Goals met    Therapy recommendation(s):    Continued therapy is recommended.  Rationale/Recommendations:  To improve pt's functional tolerance and restore to high PLOF and wean from assistive device and O2 needs as able, further home PT is warranted. .

## 2020-05-04 NOTE — PROGRESS NOTES
Received intake call for home oxygen at 9:15am. Reviewed patient's chart; Patient qualifies under new insurance guidelines during COVID pandemic. Good walk test and notes, but no order is entered.  9:37am- Called [facility]. Confirmed patient is leaving tomorrow. We asked if provider would be able to enter order today, to help speed up delivery for discharge tomorrow. She said she will let the provider know and confirmed the dx will be COVID-19. Told her we will watch the chart for the order and then call patient to offer choice.   1:00pm- Order entered  1:10pm- Called to offer choice and patient is okay with [facility] setting him up. Discussed equipment with patient and he is okay with starting with a POC. Also discussed want for pulse oximeter and he confirmed it would be good to reach out to his wife to complete that order. Informed him that we would deliver all products to him at bedside in the hospital tomorrow morning.   1:20pm- Called patient's wife. Answered all questions about oxygen and set up order for pulse oximeter to be delivered with oxygen in the morning. We did not have a discharge time for her, but said we would have the oxygen to the hospital by 10am. 1:31pm- Called [facility] back, explained that we have the oxygen order and have worked out the pulse oximeter for delivery tomorrow morning. Explained our new delivery process with covid patient's and she confirmed a nurse will be available for education when we deliver by 10am to the floor. She said that would work perfectly. Did not need anything else at this time.

## 2020-05-04 NOTE — PLAN OF CARE
FOCUS/GOAL  Bowel management, Bladder management, and Medical management    ASSESSMENT, INTERVENTIONS AND CONTINUING PLAN FOR GOAL:  Patient alert and oriented X 4. Using call light appropriately to verbalize need. Denies pain during shift. Continent of bladder, using toilet. LBM 05/04, bowel meds given with results.Patient refuse 1400 bowel meds. Patient on 3L O2 NC, O2 saturation at 96%. Mod I in room with walker. Continue with POC. Discharging tomorrw with home oxygen.

## 2020-05-04 NOTE — PROGRESS NOTES
On track to discharge home tomorrow. Pt will need home O2. Walk test completed by OT this morning. Spoke with pt, pt offered choice. With permission, referral sent to  DME. Need home O2 order and F2F, provider notified. Plan for delivery tomorrow morning.  DME also working on getting pulse oximetry as well. Discussed [facility] services with pt as well, with permission, referral sent to [facility] for RN, PT, OT and HHA.       [facility] ([facility])  PH: [phone] x [phone]  Fax: [phone]    [facility] Ph: [phone]  RN, PT, OT and HHA       [facility] Brown, KEILA, [facility] Acute Rehab Unit   Phone: [phone]  I   Pager: [phone]

## 2020-05-04 NOTE — PLAN OF CARE
Patient is A&Ox4, able to make needs known. Denies pain. WILVER in room. On 3L of O2 via NC sating in the mid 90's. No other concerns. Continue with POC.

## 2020-05-04 NOTE — PLAN OF CARE
FOCUS/GOAL  Medical management    ASSESSMENT, INTERVENTIONS AND CONTINUING PLAN FOR GOAL:  Pt is alert and oriented. No complaints of pain. Mod I in room with walker. Continent of bladder. On 3L O2 NC overnight. Sats WDL. Appeared to sleep well overnight.

## 2020-05-04 NOTE — PROGRESS NOTES
Home O2 order completed. [facility] called to confirm. Pt will be given pulse ox as well to discharge home with. [facility] notified SWer that due to COVID-19 the O2 will be delivered to RN station in the morning and request that RN goes over it with the pt at bedside before discharge. Estimated time of delivery around 10am.     KEILA Lopez, [facility] Acute Rehab Unit   Phone: [phone]  I   Pager: [phone]

## 2020-05-04 NOTE — PROGRESS NOTES
Occupational Therapy Discharge Summary    Reason for therapy discharge:    Discharged to home with home therapy.    Progress towards therapy goal(s). See goals on Care Plan in [facility] electronic health record for goal details.  Goals met    Therapy recommendation(s):    Continued therapy is recommended.  Rationale/Recommendations:  Recommend HC OT services to increase IND and safety with ADLs/IADLs. .Pt is IND with FB dressing, toileting, and G/H tasks. Pt requires supervision with shower transfers with extended tub bench with 4WW and distant supervision with washing/rinsing/dry 10/10 body parts while seated on extended tub bench. Pt is able to complete light home mgmt tasks and meal preparation I'tly while on 3L supplemental oxygen. Pt requires frequent rest breaks d/t oxygen decreasing and pt becoming tachycardic with activity. Pt has a shower chair to utilize upon discharge. Recommending spouse initially provide supervision with bathing, and assistance with IADLs upon discharge d/t impaired activity tolerance. Pt to obtain oximeter through [facility]. Provided pt printed handout on how to purchase non skid tape for shower.

## 2020-05-05 VITALS
SYSTOLIC BLOOD PRESSURE: 107 MMHG | RESPIRATION RATE: 12 BRPM | WEIGHT: 199.6 LBS | HEIGHT: 68 IN | DIASTOLIC BLOOD PRESSURE: 68 MMHG | TEMPERATURE: 98.3 F | OXYGEN SATURATION: 97 % | BODY MASS INDEX: 30.25 KG/M2 | HEART RATE: 67 BPM

## 2020-05-05 PROCEDURE — 25000132 ZZH RX MED GY IP 250 OP 250 PS 637: Mod: GY | Performed by: PHYSICAL MEDICINE & REHABILITATION

## 2020-05-05 PROCEDURE — 25000132 ZZH RX MED GY IP 250 OP 250 PS 637: Mod: GY | Performed by: STUDENT IN AN ORGANIZED HEALTH CARE EDUCATION/TRAINING PROGRAM

## 2020-05-05 RX ADMIN — HYDROCHLOROTHIAZIDE 12.5 MG: 12.5 CAPSULE ORAL at 07:54

## 2020-05-05 RX ADMIN — MULTIPLE VITAMINS W/ MINERALS TAB 1 TABLET: TAB at 07:54

## 2020-05-05 RX ADMIN — AMLODIPINE BESYLATE 5 MG: 5 TABLET ORAL at 07:54

## 2020-05-05 NOTE — DISCHARGE INSTRUCTIONS
Follow Up Appointments:    -- Primary Care (Dr. Lc Caruso)  You are scheduled to see Dr. Caruso on Friday, May 8th at 10:15 AM. You will need to wear a mask to this appointment, if you do not have one, the  clinic should. If you have any questions prior to this appointment, please call [phone].    Address  [address], [address]                          [place] [address]  Phone   [phone]  Fax                  [phone]    -- PM&R with Dr. Velez in 4 months.  They will follow up with you. If you do not hear from them in a few days, please call [facility]anna at [phone] to schedule with Dr. Velez.    Address  [facility] - Physical Medicine and Rehabilitation                          Clinics and [facility] Center, Floor [address]                          [address] [place] [address]  Phone   [facility] [phone]    Home Health Care/Oxygen   [facility] (D[facility])-Home oxygen  PH: [phone] x [phone] x [phone]  Fax: [phone]    [facility] Ph: [phone]  Nurse, physical therapy, occupational therapy and home health aide

## 2020-05-05 NOTE — DISCHARGE SUMMARY
[facility]   Acute Rehabilitation Unit  Discharge summary     Date of Admission: 4/27/2020  Date of Discharge: 05/05/2020  Disposition: to home with family  Primary Care Physician: Lc Caruso  Attending physician: Dr. Nathan Velez  Other significant physician provider(s):   Medicine: MIKE Ba    DISCHARGE DIAGNOSIS  Deconditioning  SARS-CoV-2 Pneumonia  Hospital Acquired Pneumonia  Hypertension  Hyperlipiedia  Benign Prostatic Hypertrophy    BRIEF SUMMARY  Eduardo Kemp is a 75 year old yo male with a past medical history of HTN, HLD, and BPH who was previously admitted for SARS-CoV-2 positive pneumonia.  His date of symptom onset is estimated to be 03/25/20 with fever and cough, which is also when he was confirmed positive with testing.  He recently traveled from [place] and his wife contracted a milder case of SARS-CoV-2.  His hospital course was complicated by a prolonged ICU stay for ARDS with intubation for 2 weeks with a secondary bacterial pneumonia.  He lost 40lbs during this admission. He has post-ICU weakness and dyspnea and O2 desaturation on mild exertion.  He continued to stabilize and was admitted to the ARU on 4/27/20.    REHABILITATION COURSE  #Deconditioning  He endorsed global weakness on admission.  His goal is to achieve modified independence at home by discharge.  Barriers to discharge include clarification of his therapy needs, supplemental oxygen requirements, and training of his family support system.  Neuropsych testing performed on 04/29/20 showed evidence of mild cognitive impairment, but his participation in therapy and day-to-day function have been adequate, suggesting a slight effect on practical cognitive function.  Further assessment is warranted outpatient.  Physical Therapy: Modified independent in room with 4WW  Occupational Therapy: Supervision with shower transfers, independent with light home management tasks,  assistance with iADLs  - Home physical and occupational therapy with home nursing  - Occupational therapy to evaluate cognition and possible need for outpatient speech/language pathology evaluation and management.    #Sleep  #Metabolic Encephalopathy (resolved)  His previous hospital course was complicated by ICU related delirium requiring seroquel.  He has not required seroquel since admission and has slept well without the use of medication.  Seroquel was discontinued.  He had no additional events of confusion during his rehab stay, and his sleep issues improved greatly with melatonin and sleep hygiene.  - melatonin 3mg prn  - encourage good sleep hygiene      #Bowel: he had regular bowel movements during his hospital stay.  - prn bowel medications  - prn fiber     #Bladder  #Benign Prostatic Hypertrophy  - PTA flomax 0.4 daily  - monitor    MEDICAL COURSE  Pulm  #SARS-CoV-2 Pneumonia  #Hopsital Acquired Pneumonia  He completed a Remdesivir trial (3/25-4/3) and required proning during weaning of ventilation.  D-dimer was elevated on 4/4.  He was treated for secondary HAP with one week of zosyn and azithromycin.  COVID testing on 4/22 was positive, but overall his pulmonary and associated clinical status improved to discharge.  He completed a 30 day course of post-covid anticoagulation.  No current signs of PE.  He had two negative COVID-19 tests since 4/29/2020, though he still has respiratory symptoms.  As his respiratory symptoms improved, he was cleared of droplet precautions on 4/30/2020.  He continued to require supplemental oxygen at rest with more in therapy, jak of 81% during exertion.  - Home pulse oximetry  - Home O2: 3L at rest, 4L with therapy and exertion    Cardiac  #Hypertension  His blood pressure has hovered around 100-110/60-70 during admission.  He had to skip prior to admission blood pressure medications while inpatient, likely secondary to his weight loss.  - Discontinued lasix per  medicine  - Hydrochlorothiazide 12.5mg daily  - amlodipine 5mg daily, verified to continue per medicine     #Hyperlipidemia: stable     FEN/GI  #Diet: Regular, thin liquids     Ppx  #GI: none  #DVT: Enoxaparin was discontinued when ambulatory.     Dispo  Code: Full code (verified with patient)  Disposition: To home with family    #Follow up:  - Primary care provider in 1-2 weeks, likely telemed.  Consider close follow up for any decompensation at home.  - PM&R in 4 months  - Home PT/OT    DISCHARGE MEDICATIONS  Discharge Medication List as of 5/5/2020 10:09 AM      CONTINUE these medications which have CHANGED    Details   amLODIPine (NORVASC) 5 MG tablet Take 1 tablet (5 mg) by mouth daily Do not take if morning blood pressure is less than 110 systolic, Disp-30 tablet,R-0, E-Prescribe      docusate sodium (COLACE) 100 MG capsule Take 1 capsule (100 mg) by mouth 3 times daily, Disp-90 capsule,R-0, E-Prescribe      hydrochlorothiazide (MICROZIDE) 12.5 MG capsule Take 1 capsule (12.5 mg) by mouth daily Do not take if morning blood pressure is less than 110 systolic., Disp-30 capsule,R-0, E-Prescribe      multivitamin w/minerals (THERA-VIT-M) tablet Take 1 tablet by mouth daily, Disp-30 tablet,R-0, E-Prescribe      polyethylene glycol (MIRALAX) 17 g packet Take 17 g by mouth daily as needed for constipation, Disp-30 packet,R-0, E-Prescribe      psyllium (METAMUCIL/KONSYL) Packet Take 1 packet by mouth daily as needed for constipation, Disp-30 packet,R-0, E-Prescribe      sennosides (SENOKOT) 8.6 MG tablet Take 2 tablets by mouth daily, Disp-60 tablet,R-0, E-Prescribe      tamsulosin (FLOMAX) 0.4 MG capsule Take 1 capsule (0.4 mg) by mouth At Bedtime, Disp-30 capsule,R-0, E-Prescribe         CONTINUE these medications which have NOT CHANGED    Details   Glucos-MSM-C-Qz-Cfwaus-Qucsin (GLUCOSAMINE MSM COMPLEX) TABS tablet Take 1 tablet by mouth daily, Historical      melatonin 3 MG tablet Take 1 tablet (3 mg) by mouth  nightly as needed for sleep, Disp-30 tablet,R-0, E-Prescribe      protein modular (PROSOURCE TF) LIQD 1 packet by Per Feeding Tube route 4 times daily, Transitional         STOP taking these medications       albuterol (PROAIR HFA/PROVENTIL HFA/VENTOLIN HFA) 108 (90 Base) MCG/ACT inhaler Comments:   Reason for Stopping:         albuterol (PROVENTIL) (2.5 MG/3ML) 0.083% neb solution Comments:   Reason for Stopping:         artificial tears OINT ophthalmic ointment Comments:   Reason for Stopping:         azithromycin 500 mg Comments:   Reason for Stopping:         bisacodyl (DULCOLAX) 10 MG suppository Comments:   Reason for Stopping:         cholecalciferol (D-VI-SOL, VITAMIN D3) 10 MCG/ML (400 units/ml) LIQD liquid Comments:   Reason for Stopping:         dexmedetomidine (PRECEDEX) 400 MCG/100ML SOLN infusion Comments:   Reason for Stopping:         dornase alpha (PULMOZYME) 1 MG/ML neb solution Comments:   Reason for Stopping:         doxazosin (CARDURA) 1 MG tablet Comments:   Reason for Stopping:         enoxaparin ANTICOAGULANT (LOVENOX) 40 MG/0.4ML syringe Comments:   Reason for Stopping:         famotidine (PEPCID) 20 MG tablet Comments:   Reason for Stopping:         fentaNYL, PF, (SUBLIMAZE) 100 MCG/2ML injection Comments:   Reason for Stopping:         magnesium sulfate 4 GM/100ML SOLN infusion Comments:   Reason for Stopping:         miconazole with skin protectant (CEASAR ANTIFUNGAL) 2 % CREA cream Comments:   Reason for Stopping:         midazolam (VERSED) 1 MG/ML injection Comments:   Reason for Stopping:         midazolam (VERSED) drip - ADULT 100 mg/100 mL in NS PRE-MIX Comments:   Reason for Stopping:         multivitamins w/minerals (CERTAVITE) liquid Comments:   Reason for Stopping:         naloxone (NARCAN) 0.4 MG/ML injection Comments:   Reason for Stopping:         ondansetron (ZOFRAN-ODT) 4 MG ODT tab Comments:   Reason for Stopping:         piperacillin-tazobactam (ZOSYN) 4-0.5 GM vial  Comments:   Reason for Stopping:         potassium chloride (KLOR-CON) 20 MEQ packet Comments:   Reason for Stopping:         potassium chloride (KLOR-CON) 20 MEQ packet Comments:   Reason for Stopping:         potassium chloride 10 mEq in 100 mL intermittent infusion with 10 mg lidocaine Comments:   Reason for Stopping:         potassium chloride 10 mEq in 100 mL sterile water intermittent infusion (premix) Comments:   Reason for Stopping:         potassium chloride 20 MEQ/50ML infusion Comments:   Reason for Stopping:         potassium chloride ER (KLOR-CON M) 20 MEQ CR tablet Comments:   Reason for Stopping:         sodium chloride, PF, 0.9% PF flush Comments:   Reason for Stopping:         sodium phosphate 15 mmol Comments:   Reason for Stopping:         sodium phosphate 20 mmol Comments:   Reason for Stopping:         sodium phosphate 25 mmol Comments:   Reason for Stopping:               DISCHARGE INSTRUCTIONS AND FOLLOW UP  Discharge Procedure Orders   Home care nursing referral   Referral Priority: Routine Referral Type: Home Health Therapies & Aides   Number of Visits Requested: 1     Home Care PT Referral for Hospital Discharge   Referral Priority: Routine Referral Type: Home Health Therapies & Aides   Number of Visits Requested: 1     Home Care OT Referral for Hospital Discharge   Referral Priority: Routine   Number of Visits Requested: 1     Reason for your hospital stay   Order Comments: Acute inpatient rehabilitation for deconditioning and hypoxia related to a diagnosis of post-acute COVID-19 pneumonia.     Adult [facility] Follow-up and recommended labs and tests   Order Comments: Follow up with primary care provider, SIMON LENZ, within 7 days for hospital follow- up.  No follow up labs or test are needed.      Appointments on [place] and/or [facility] (with [facility] or [facility] provider or service). Call [phone] if you haven't heard regarding these appointments within 7 days of discharge.      Activity   Order Comments: Your activity upon discharge: activity as tolerated and no lifting, driving, or strenuous exercise until reassessment by your therapists and physicians.     Order Specific Question Answer Comments   Is discharge order? Yes      MD face to face encounter   Order Comments: Documentation of Face to Face and Certification for Home Health Services    I certify that patient: Eduardo Kemp is under my care and that I, or a nurse practitioner or physician's assistant working with me, had a face-to-face encounter that meets the physician face-to-face encounter requirements with this patient on: 5/4/2020.    This encounter with the patient was in whole, or in part, for the following medical condition, which is the primary reason for home health care: post-acute COVID-19 related deconditioning.    I certify that, based on my findings, the following services are medically necessary home health services: Nursing, Occupational Therapy and Physical Therapy.    My clinical findings support the need for the above services because: Nurse is needed: To assess blood pressure stability after changes in medications or other medical regimen., To provide assessment and oversight required in the home to assure adherence to the medical plan due to: debility. and To provide caregiver training to assist with: deconditioning and debility.., Occupational Therapy Services are needed to assess and treat cognitive ability and address ADL safety due to impairment in activities of daily living. and Physical Therapy Services are needed to assess and treat the following functional impairments: weakness and unsteadiness of gait.    Further, I certify that my clinical findings support that this patient is homebound (i.e. absences from home require considerable and taxing effort and are for medical reasons or [employer] services or infrequently or of short duration when for other reasons) because: Leaving home is medically  contraindicated for the following reason(s): Dyspnea on exertion that makes it so they cannot leave their home for needed services without clinical deterioration...    Based on the above findings. I certify that this patient is confined to the home and needs intermittent skilled nursing care, physical therapy and/or speech therapy.  The patient is under my care, and I have initiated the establishment of the plan of care.  This patient will be followed by a physician who will periodically review the plan of care.  Physician/Provider to provide follow up care: Delvin Koch    Attending hospital physician (the Medicare certified [facility] provider): Nathan Velez DO  Physician Signature: See electronic signature associated with these discharge orders.  Date: 5/4/2020     Full Code     Order Specific Question Answer Comments   Code status determined by: Discussion with patient/legal decision maker      Oxygen Adult/Peds   Order Comments: Oxygen Documentation:   I certify that this patient, Eduardo Kemp has been under my care (or a nurse practitioner or physican's assistant working with me). This is the face-to-face encounter for oxygen medical necessity.      Eduardo Kemp is now in a chronic stable state and continues to require supplemental oxygen. Patient has continued oxygen desaturation due to post-acute COVID-19 related ARDS.    Alternative treatment(s) tried or considered and deemed clinically infective for treatment of post-acute COVID-19 related ARDS include nebulizers, inhalers and pulmonary rehab.  If portability is ordered, is the patient mobile within the home? yes    Patients who qualify for home O2 coverage under the CMS guidelines require ABG tests or O2 sat readings obtained closest to, but no earlier than 2 days prior to the discharge, as evidence of the need for home oxygen therapy. Testing must be performed while patient is in the chronic stable state. See notes for O2 sats.     Order  "Specific Question Answer Comments   DME Provider: [facility]    Oxygen Consult Reqt: Call [facility] before patient leaves at [phone] (to ensure SATS testing is completed).    Did the patient have SpO2 (sat) testing? Yes    Length of Need: Lifetime    Frequency of Use: Continuous    Frequency of Use: With Activity    Mode of Delivery - Continuous Nasal Cannula    Liter Flow - Continuous 3    Mode of Delivery - With Activity Nasal Cannula    Liter Flow - With Activity 4    Need for Portability: Yes    Evaluate for Conserving Device: Yes    Maintain Sats >= 90%    The face to face evaluation was performed on: 5/4/2020      Diet   Order Comments: Follow this diet upon discharge: Orders Placed This Encounter      Room Service      Combination Diet Regular Diet Adult; Thin Liquids (water, ice chips, juice, milk, gelatin, ice cream, etc); Regular Diet       Order Specific Question Answer Comments   Is discharge order? Yes         PHYSICAL EXAMINATION  /68 (BP Location: Left arm)   Pulse 67   Temp 98.3  F (36.8  C) (Oral)   Resp 12   Ht 1.727 m (5' 8\")   Wt 90.5 kg (199 lb 9.6 oz)   SpO2 97%   BMI 30.35 kg/m    Gen: sitting up in bed comfortably, no acute distress  HEENT: no conjunctival injection, normocephalic, no rhinorrhea, neck supple  Cardio: regular rate and rhythm, no murmurs  Pulm: good air entry bilaterally.  Basilar rales, left more than right.  No wheezes or rhonchi.  Requiring 3L NC O2 supplementation.  Abd: active bowel sounds, nontender, soft, nondistended  Ext: no pretibial edema, warm and well perfused  Neuro/MSK:  Alert, oriented to self, time, and place, follows complex commands appropriately, speech fluent and comprehensible  Pupils equal, EOMI, facial sensation symmetric to light touch, no facial asymmetry on smile, hearing symmetric, palate midline, tongue midline, neck strength equal bilaterally  Strength 5/5 across all major extremity muscle groups and " symmetric bilaterally  Sensation symmetric across all four extremities.  No signs of sensory neglect  Reflexes 2+/4 in biceps, patellar, and achilles.  No vergara's.  Gait not observed.    Discharge summary was forwarded to Lc Caruso (PCP) at the time of discharge, so as to bridge from hospital to outpatient care.     It was our pleasure to care for Eduardo Kemp during this hospitalization. Please do not hesitate to contact me should there be questions regarding the hospital course or discharge plan.      Chris Hernandes MD  Physical Medicine & Rehabilitation

## 2020-05-05 NOTE — PROGRESS NOTES
[facility]   Telephone call to home number and spoke to  spouse to discuss plans for homecare.  Pt to be discharged home 05 05 20  and has agreed to have [facility] follow with services of SN, PT and OT. Patient care support center processing referral.  Spouse verbalized understanding that initial visit is scheduled for 05 06 20 or 05 07 20.  I reviewed the 24 hour phone number for [facility] for any questions or concerns.

## 2020-05-05 NOTE — PLAN OF CARE
FOCUS/GOAL  Discharge planning    ASSESSMENT, INTERVENTIONS AND CONTINUING PLAN FOR GOAL:    Patient alert and oriented X4, Using call light appropriately and verbalizing needs. Denies pain during shift. VSS, patient on 3L O2 and O2 sat greater than 90%. Patient using IS spontaneously. Continent of bladder, using toilet. LBM 05/04, patient refused morning bowel meds. Patient discharged home with home oxygen supply. Discharge education given to patient and family on use of oxygen tank supplied by [facility]. Discharge medication and follow up medical appointment reviewed. All education provided  to patient and family. Discharge medication sent with patient and family. Patient Mod I with a walker.

## 2020-07-13 ENCOUNTER — DOCUMENTATION ONLY (OUTPATIENT)
Dept: CARE COORDINATION | Facility: CLINIC | Age: 75
End: 2020-07-13

## 2020-09-18 ENCOUNTER — VIRTUAL VISIT (OUTPATIENT)
Dept: PHYSICAL MEDICINE AND REHAB | Facility: CLINIC | Age: 75
End: 2020-09-18
Payer: MEDICARE

## 2020-09-18 VITALS
HEART RATE: 61 BPM | SYSTOLIC BLOOD PRESSURE: 121 MMHG | BODY MASS INDEX: 31.37 KG/M2 | HEIGHT: 68 IN | DIASTOLIC BLOOD PRESSURE: 72 MMHG | OXYGEN SATURATION: 95 % | WEIGHT: 207 LBS

## 2020-09-18 DIAGNOSIS — R53.81 PHYSICAL DECONDITIONING: ICD-10-CM

## 2020-09-18 DIAGNOSIS — U07.1 COVID-19 VIRUS INFECTION: Primary | ICD-10-CM

## 2020-09-18 DIAGNOSIS — Z86.16 HISTORY OF 2019 NOVEL CORONAVIRUS DISEASE (COVID-19): ICD-10-CM

## 2020-09-18 RX ORDER — ASPIRIN 81 MG/1
81 TABLET, CHEWABLE ORAL DAILY
COMMUNITY

## 2020-09-18 RX ORDER — ATORVASTATIN CALCIUM 20 MG/1
TABLET, FILM COATED ORAL
COMMUNITY
Start: 2020-07-14

## 2020-09-18 ASSESSMENT — PAIN SCALES - GENERAL: PAINLEVEL: NO PAIN (0)

## 2020-09-18 ASSESSMENT — MIFFLIN-ST. JEOR: SCORE: 1648.45

## 2020-09-18 NOTE — LETTER
2020       RE: Eduardo Kemp  [address]  [facility] [address]     Dear Colleague,    Thank you for referring your patient, Eduardo Kemp, to the [facility] PHYSICAL MEDICINE AND REHABILITATION at [facility]. Please see a copy of my visit note below.           PM&R Clinic Note     Patient Name: Eduardo Kemp : 1945 Medical Record: [id number]     Requesting Physician/clinician: No att. providers found           History of Present Illness:     Eduardo Kemp is a 75 year old yo male with a past medical history of HTN, HLD, and BPH who was previously admitted for SARS-CoV-2 positive pneumonia.  His date of symptom onset is estimated to be 20 with fever and cough, which is also when he was confirmed positive with testing.  He recently traveled from [place] and his wife contracted a milder case of SARS-CoV-2.  His hospital course was complicated by a prolonged ICU stay for ARDS with intubation for 2 weeks with a secondary bacterial pneumonia.  He lost 40lbs during this admission. He has post-ICU weakness and dyspnea and O2 desaturation on mild exertion.  He continued to stabilize and was admitted to the ARU on 20.     REHABILITATION COURSE  #Deconditioning  He endorsed global weakness on admission.  His goal is to achieve modified independence at home by discharge.  Barriers to discharge include clarification of his therapy needs, supplemental oxygen requirements, and training of his family support system.  Neuropsych testing performed on 20 showed evidence of mild cognitive impairment, but his participation in therapy and day-to-day function have been adequate, suggesting a slight effect on practical cognitive function.  Further assessment is warranted outpatient.  Physical Therapy: Modified independent in room with 4WW  Occupational Therapy: Supervision with shower transfers, independent with light home management tasks, assistance with iADLs  -  Home physical and occupational therapy with home nursing  - Occupational therapy to evaluate cognition and possible need for outpatient speech/language pathology evaluation and management.    Currently:  Doing very well.  Has not used supplemental oxygen in the last 2 months.  Has not started pulm rehab.  O2 saturations maintain at 97% while working and doing stuff 92-91%.  He knows limitations.  Completed home therapy.  Has not used cane in about a month.  No issues with bowel or bladder.   Denies any depression or PTSD.            Past Medical and Surgical History:     Past Medical History:   Diagnosis Date     Hypertension      No past surgical history on file.         Social History:     Social History     Tobacco Use     Smoking status: Never Smoker     Smokeless tobacco: Never Used   Substance Use Topics     Alcohol use: Not Currently     Recreational drug use: none         Functional history:     The patient lives with his wife, who also had COVID19 infection, they live in [place]. The house is a [address], with 3 NE[address] and lower level which he does not need to access.   He reports that his wife is in good health.      The patient was previously independent with ambulation without use of cane or walker, previously independent with upper and lower body self care, ADLs, and IADLs.            Family History:     No family history on file.           Medications:     Current Outpatient Medications   Medication Sig Dispense Refill     amLODIPine (NORVASC) 5 MG tablet Take 1 tablet (5 mg) by mouth daily Do not take if morning blood pressure is less than 110 systolic 30 tablet 0     aspirin (ASA) 81 MG chewable tablet Take 81 mg by mouth daily       atorvastatin (LIPITOR) 20 MG tablet TAKE 1 TABLET BY MOUTH IN THE MORNING       Glucos-MSM-C-Ls-Vjqdmt-Ywadpb (GLUCOSAMINE MSM COMPLEX) TABS tablet Take 1 tablet by mouth daily       hydrochlorothiazide (MICROZIDE) 12.5 MG capsule Take 1 capsule (12.5 mg) by mouth  "daily Do not take if morning blood pressure is less than 110 systolic. 30 capsule 0     multivitamin w/minerals (THERA-VIT-M) tablet Take 1 tablet by mouth daily 30 tablet 0     tamsulosin (FLOMAX) 0.4 MG capsule Take 1 capsule (0.4 mg) by mouth At Bedtime 30 capsule 0     docusate sodium (COLACE) 100 MG capsule Take 1 capsule (100 mg) by mouth 3 times daily (Patient not taking: Reported on 9/18/2020) 90 capsule 0     sennosides (SENOKOT) 8.6 MG tablet Take 2 tablets by mouth daily (Patient not taking: Reported on 9/18/2020) 60 tablet 0            Allergies:     Allergies   Allergen Reactions     Ace Inhibitors      Possible angioedema 7/15.              ROS:        ROS: 10 point ROS neg other than the symptoms noted above in the HPI.             Physical Examiniation:     VITAL SIGNS: /72   Pulse 61   Ht 1.727 m (5' 8\")   Wt 93.9 kg (207 lb)   SpO2 95%   BMI 31.47 kg/m    BMI: Estimated body mass index is 31.47 kg/m  as calculated from the following:    Height as of this encounter: 1.727 m (5' 8\").    Weight as of this encounter: 93.9 kg (207 lb).    EXAM:  Patient is interacting and in no acute distress.  Awake and alert.  No facial trauma or apparent cranial nerve deficit  No aphasia present  No deformities or rashes noted           Laboratory/Imaging:     Lab Results   Component Value Date    WBC 6.0 05/01/2020     Lab Results   Component Value Date    RBC 3.93 05/01/2020     Lab Results   Component Value Date    HGB 11.9 05/01/2020     Lab Results   Component Value Date    HCT 36.5 05/01/2020     Lab Results   Component Value Date    MCV 93 05/01/2020     Lab Results   Component Value Date    MCH 30.3 05/01/2020     Lab Results   Component Value Date    MCHC 32.6 05/01/2020     Lab Results   Component Value Date    RDW 14.0 05/01/2020     Lab Results   Component Value Date     05/01/2020            Assessment/Plan:     Eduardo was seen today for hospital f/u.    Diagnoses and all orders for this " visit:    COVID-19 virus infection  -     PULMONARY REHAB REFERRAL; Future    History of 2019 novel coronavirus disease (COVID-19)  -     NEUROPSYCHOLOGY REFERRAL    Physical deconditioning    1. Patient education: In depth discussion and education was provided about the assessment and implications of each of the below recommendations for management. Patient indicated readiness to learn, all questions were answered and understanding of material presented was confirmed.  2. Work-up: none   3. Therapy/equipment/braces: pulm rehab  4. Medications: no changes  5. Interventions: discussed progressive endurance/activity   6. Referral / follow up with other providers: PCP, neuro-psych consult  7. Follow up: 3 months for progress.       I spent a total of 19 minutes face-to-face with Eduardo Kemp during today's office virtual visit. Over 50% of this time was spent counseling the patient and/or coordinating care. See note for details.       Again, thank you for allowing me to participate in the care of your patient.  Sincerely,    Nathan Velez, DO  Physical Medicine & Rehabilitation

## 2020-09-18 NOTE — LETTER
"2020       RE: Eduardo Kemp  [address]  [facility] [address]     Dear Colleague,    Thank you for referring your patient, Eduardo Kemp, to the [facility] PHYSICAL MEDICINE AND REHABILITATION at [facility]. Please see a copy of my visit note below.    Eduardo Kemp is a 75 year old male who is being evaluated via a billable video visit.      The patient has been notified of following:     \"This video visit will be conducted via a call between you and your physician/provider. We have found that certain health care needs can be provided without the need for an in-person physical exam.  This service lets us provide the care you need with a video conversation.  If a prescription is necessary we can send it directly to your pharmacy.  If lab work is needed we can place an order for that and you can then stop by our lab to have the test done at a later time.    Video visits are billed at different rates depending on your insurance coverage.  Please reach out to your insurance provider with any questions.    If during the course of the call the physician/provider feels a video visit is not appropriate, you will not be charged for this service.\"    Patient has given verbal consent for Video visit? Yes  How would you like to obtain your AVS? MyChart  If you are dropped from the video visit, the video invite should be resent to: Text to cell phone: [phone]  Will anyone else be joining your video visit? No               PM&R Clinic Note     Patient Name: Eduardo Kemp : 1945 Medical Record: [id number]     Requesting Physician/clinician: No att. providers found           History of Present Illness:     Eduardo Kemp is a 75 year old yo male with a past medical history of HTN, HLD, and BPH who was previously admitted for SARS-CoV-2 positive pneumonia.  His date of symptom onset is estimated to be 20 with fever and cough, which is also when he was confirmed " positive with testing.  He recently traveled from [place] and his wife contracted a milder case of SARS-CoV-2.  His hospital course was complicated by a prolonged ICU stay for ARDS with intubation for 2 weeks with a secondary bacterial pneumonia.  He lost 40lbs during this admission. He has post-ICU weakness and dyspnea and O2 desaturation on mild exertion.  He continued to stabilize and was admitted to the ARU on 4/27/20.     REHABILITATION COURSE  #Deconditioning  He endorsed global weakness on admission.  His goal is to achieve modified independence at home by discharge.  Barriers to discharge include clarification of his therapy needs, supplemental oxygen requirements, and training of his family support system.  Neuropsych testing performed on 04/29/20 showed evidence of mild cognitive impairment, but his participation in therapy and day-to-day function have been adequate, suggesting a slight effect on practical cognitive function.  Further assessment is warranted outpatient.  Physical Therapy: Modified independent in room with 4WW  Occupational Therapy: Supervision with shower transfers, independent with light home management tasks, assistance with iADLs  - Home physical and occupational therapy with home nursing  - Occupational therapy to evaluate cognition and possible need for outpatient speech/language pathology evaluation and management.    Currently:  Doing very well.  Has not used supplemental oxygen in the last 2 months.  Has not started pulm rehab.  O2 saturations maintain at 97% while working and doing stuff 92-91%.  He knows limitations.  Completed home therapy.  Has not used cane in about a month.  No issues with bowel or bladder.   Denies any depression or PTSD.            Past Medical and Surgical History:     Past Medical History:   Diagnosis Date     Hypertension      No past surgical history on file.         Social History:     Social History     Tobacco Use     Smoking status: Never Smoker      Smokeless tobacco: Never Used   Substance Use Topics     Alcohol use: Not Currently     Recreational drug use: none         Functional history:     The patient lives with his wife, who also had COVID19 infection, they live in [place]. The house is a [address], with 3 [address] and lower level which he does not need to access.   He reports that his wife is in good health.      The patient was previously independent with ambulation without use of cane or walker, previously independent with upper and lower body self care, ADLs, and IADLs.            Family History:     No family history on file.         Medications:     Current Outpatient Medications   Medication Sig Dispense Refill     amLODIPine (NORVASC) 5 MG tablet Take 1 tablet (5 mg) by mouth daily Do not take if morning blood pressure is less than 110 systolic 30 tablet 0     aspirin (ASA) 81 MG chewable tablet Take 81 mg by mouth daily       atorvastatin (LIPITOR) 20 MG tablet TAKE 1 TABLET BY MOUTH IN THE MORNING       Glucos-MSM-C-No-Kmequa-Yifzhp (GLUCOSAMINE MSM COMPLEX) TABS tablet Take 1 tablet by mouth daily       hydrochlorothiazide (MICROZIDE) 12.5 MG capsule Take 1 capsule (12.5 mg) by mouth daily Do not take if morning blood pressure is less than 110 systolic. 30 capsule 0     multivitamin w/minerals (THERA-VIT-M) tablet Take 1 tablet by mouth daily 30 tablet 0     tamsulosin (FLOMAX) 0.4 MG capsule Take 1 capsule (0.4 mg) by mouth At Bedtime 30 capsule 0     docusate sodium (COLACE) 100 MG capsule Take 1 capsule (100 mg) by mouth 3 times daily (Patient not taking: Reported on 9/18/2020) 90 capsule 0     sennosides (SENOKOT) 8.6 MG tablet Take 2 tablets by mouth daily (Patient not taking: Reported on 9/18/2020) 60 tablet 0            Allergies:     Allergies   Allergen Reactions     Ace Inhibitors      Possible angioedema 7/15.              ROS:        ROS: 10 point ROS neg other than the symptoms noted above in the HPI.             Physical  "Examiniation:     VITAL SIGNS: /72   Pulse 61   Ht 1.727 m (5' 8\")   Wt 93.9 kg (207 lb)   SpO2 95%   BMI 31.47 kg/m    BMI: Estimated body mass index is 31.47 kg/m  as calculated from the following:    Height as of this encounter: 1.727 m (5' 8\").    Weight as of this encounter: 93.9 kg (207 lb).    EXAM:  Patient is interacting and in no acute distress.  Awake and alert.  No facial trauma or apparent cranial nerve deficit  No aphasia present  No deformities or rashes noted               Laboratory/Imaging:     Lab Results   Component Value Date    WBC 6.0 05/01/2020     Lab Results   Component Value Date    RBC 3.93 05/01/2020     Lab Results   Component Value Date    HGB 11.9 05/01/2020     Lab Results   Component Value Date    HCT 36.5 05/01/2020     Lab Results   Component Value Date    MCV 93 05/01/2020     Lab Results   Component Value Date    MCH 30.3 05/01/2020     Lab Results   Component Value Date    MCHC 32.6 05/01/2020     Lab Results   Component Value Date    RDW 14.0 05/01/2020     Lab Results   Component Value Date     05/01/2020                Assessment/Plan:     Eduardo was seen today for hospital f/u.    Diagnoses and all orders for this visit:    COVID-19 virus infection  -     PULMONARY REHAB REFERRAL; Future    History of 2019 novel coronavirus disease (COVID-19)  -     NEUROPSYCHOLOGY REFERRAL    Physical deconditioning          1. Patient education: In depth discussion and education was provided about the assessment and implications of each of the below recommendations for management. Patient indicated readiness to learn, all questions were answered and understanding of material presented was confirmed.  2. Work-up: none   3. Therapy/equipment/braces: pulm rehab  4. Medications: no changes  5. Interventions: discussed progressive endurance/activity   6. Referral / follow up with other providers: PCP, neuro-psych consult  7. Follow up: 3 months for progress.     Nathan OLIVEIRA" DO Rosie  Physical Medicine & Rehabilitation    I spent a total of 19 minutes face-to-face with Eduardo YOGESH Kemp during today's office virtual visit. Over 50% of this time was spent counseling the patient and/or coordinating care. See note for details.                 Video-Visit Details    Type of service:  Video Visit    Start: 09/18/2020 02:16 pm   Stop: 09/18/2020 02:35 pm    Originating Location (pt. Location): Home    Distant Location (provider location):  [facility] PHYSICAL MEDICINE AND REHABILITATION     Platform used for Video Visit: [facility]            Again, thank you for allowing me to participate in the care of your patient.      Sincerely,    Nathan Velez DO

## 2020-09-18 NOTE — PROGRESS NOTES
"Eduardo Kemp is a 75 year old male who is being evaluated via a billable video visit.      The patient has been notified of following:     \"This video visit will be conducted via a call between you and your physician/provider. We have found that certain health care needs can be provided without the need for an in-person physical exam.  This service lets us provide the care you need with a video conversation.  If a prescription is necessary we can send it directly to your pharmacy.  If lab work is needed we can place an order for that and you can then stop by our lab to have the test done at a later time.    Video visits are billed at different rates depending on your insurance coverage.  Please reach out to your insurance provider with any questions.    If during the course of the call the physician/provider feels a video visit is not appropriate, you will not be charged for this service.\"    Patient has given verbal consent for Video visit? Yes  How would you like to obtain your AVS? MyChart  If you are dropped from the video visit, the video invite should be resent to: Text to cell phone: [phone]  Will anyone else be joining your video visit? No               PM&R Clinic Note     Patient Name: Eduardo Kemp : 1945 Medical Record: [id number]     Requesting Physician/clinician: No att. providers found           History of Present Illness:     Eduardo Kemp is a 75 year old yo male with a past medical history of HTN, HLD, and BPH who was previously admitted for SARS-CoV-2 positive pneumonia.  His date of symptom onset is estimated to be 20 with fever and cough, which is also when he was confirmed positive with testing.  He recently traveled from [place] and his wife contracted a milder case of SARS-CoV-2.  His hospital course was complicated by a prolonged ICU stay for ARDS with intubation for 2 weeks with a secondary bacterial pneumonia.  He lost 40lbs during this admission. He has " post-ICU weakness and dyspnea and O2 desaturation on mild exertion.  He continued to stabilize and was admitted to the ARU on 4/27/20.     REHABILITATION COURSE  #Deconditioning  He endorsed global weakness on admission.  His goal is to achieve modified independence at home by discharge.  Barriers to discharge include clarification of his therapy needs, supplemental oxygen requirements, and training of his family support system.  Neuropsych testing performed on 04/29/20 showed evidence of mild cognitive impairment, but his participation in therapy and day-to-day function have been adequate, suggesting a slight effect on practical cognitive function.  Further assessment is warranted outpatient.  Physical Therapy: Modified independent in room with 4WW  Occupational Therapy: Supervision with shower transfers, independent with light home management tasks, assistance with iADLs  - Home physical and occupational therapy with home nursing  - Occupational therapy to evaluate cognition and possible need for outpatient speech/language pathology evaluation and management.    Currently:  Doing very well.  Has not used supplemental oxygen in the last 2 months.  Has not started pulm rehab.  O2 saturations maintain at 97% while working and doing stuff 92-91%.  He knows limitations.  Completed home therapy.  Has not used cane in about a month.  No issues with bowel or bladder.   Denies any depression or PTSD.            Past Medical and Surgical History:     Past Medical History:   Diagnosis Date     Hypertension      No past surgical history on file.         Social History:     Social History     Tobacco Use     Smoking status: Never Smoker     Smokeless tobacco: Never Used   Substance Use Topics     Alcohol use: Not Currently     Recreational drug use: none         Functional history:     The patient lives with his wife, who also had COVID19 infection, they live in [place]. The house is a [address], with 3 NE[address] and lower  "level which he does not need to access.   He reports that his wife is in good health.      The patient was previously independent with ambulation without use of cane or walker, previously independent with upper and lower body self care, ADLs, and IADLs.            Family History:     No family history on file.         Medications:     Current Outpatient Medications   Medication Sig Dispense Refill     amLODIPine (NORVASC) 5 MG tablet Take 1 tablet (5 mg) by mouth daily Do not take if morning blood pressure is less than 110 systolic 30 tablet 0     aspirin (ASA) 81 MG chewable tablet Take 81 mg by mouth daily       atorvastatin (LIPITOR) 20 MG tablet TAKE 1 TABLET BY MOUTH IN THE MORNING       Glucos-MSM-C-Oj-Ytvarp-Bezzqh (GLUCOSAMINE MSM COMPLEX) TABS tablet Take 1 tablet by mouth daily       hydrochlorothiazide (MICROZIDE) 12.5 MG capsule Take 1 capsule (12.5 mg) by mouth daily Do not take if morning blood pressure is less than 110 systolic. 30 capsule 0     multivitamin w/minerals (THERA-VIT-M) tablet Take 1 tablet by mouth daily 30 tablet 0     tamsulosin (FLOMAX) 0.4 MG capsule Take 1 capsule (0.4 mg) by mouth At Bedtime 30 capsule 0     docusate sodium (COLACE) 100 MG capsule Take 1 capsule (100 mg) by mouth 3 times daily (Patient not taking: Reported on 9/18/2020) 90 capsule 0     sennosides (SENOKOT) 8.6 MG tablet Take 2 tablets by mouth daily (Patient not taking: Reported on 9/18/2020) 60 tablet 0            Allergies:     Allergies   Allergen Reactions     Ace Inhibitors      Possible angioedema 7/15.              ROS:        ROS: 10 point ROS neg other than the symptoms noted above in the HPI.             Physical Examiniation:     VITAL SIGNS: /72   Pulse 61   Ht 1.727 m (5' 8\")   Wt 93.9 kg (207 lb)   SpO2 95%   BMI 31.47 kg/m    BMI: Estimated body mass index is 31.47 kg/m  as calculated from the following:    Height as of this encounter: 1.727 m (5' 8\").    Weight as of this encounter: 93.9 " kg (207 lb).    EXAM:  Patient is interacting and in no acute distress.  Awake and alert.  No facial trauma or apparent cranial nerve deficit  No aphasia present  No deformities or rashes noted               Laboratory/Imaging:     Lab Results   Component Value Date    WBC 6.0 05/01/2020     Lab Results   Component Value Date    RBC 3.93 05/01/2020     Lab Results   Component Value Date    HGB 11.9 05/01/2020     Lab Results   Component Value Date    HCT 36.5 05/01/2020     Lab Results   Component Value Date    MCV 93 05/01/2020     Lab Results   Component Value Date    MCH 30.3 05/01/2020     Lab Results   Component Value Date    MCHC 32.6 05/01/2020     Lab Results   Component Value Date    RDW 14.0 05/01/2020     Lab Results   Component Value Date     05/01/2020                Assessment/Plan:     Eduardo was seen today for hospital f/u.    Diagnoses and all orders for this visit:    COVID-19 virus infection  -     PULMONARY REHAB REFERRAL; Future    History of 2019 novel coronavirus disease (COVID-19)  -     NEUROPSYCHOLOGY REFERRAL    Physical deconditioning          1. Patient education: In depth discussion and education was provided about the assessment and implications of each of the below recommendations for management. Patient indicated readiness to learn, all questions were answered and understanding of material presented was confirmed.  2. Work-up: none   3. Therapy/equipment/braces: pulm rehab  4. Medications: no changes  5. Interventions: discussed progressive endurance/activity   6. Referral / follow up with other providers: PCP, neuro-psych consult  7. Follow up: 3 months for progress.     Nathan Velez, DO  Physical Medicine & Rehabilitation    I spent a total of 19 minutes face-to-face with Eduardo Kemp during today's office virtual visit. Over 50% of this time was spent counseling the patient and/or coordinating care. See note for details.                 Video-Visit Details    Type  of service:  Video Visit    Start: 09/18/2020 02:16 pm   Stop: 09/18/2020 02:35 pm    Originating Location (pt. Location): Home    Distant Location (provider location):  [facility] PHYSICAL MEDICINE AND REHABILITATION     Platform used for Video Visit: [employer]

## 2020-09-18 NOTE — NURSING NOTE
"Chief Complaint   Patient presents with     Hospital F/U      admitted to ARU 4/27/20 debility / COVID       Vitals:    09/18/20 1402   BP: 121/72   Pulse: 61   SpO2: 95%   Weight: 93.9 kg (207 lb)   Height: 1.727 m (5' 8\")       Body mass index is 31.47 kg/m .                                             "

## 2020-09-21 DIAGNOSIS — U07.1 COVID-19 VIRUS INFECTION: ICD-10-CM

## 2020-09-21 DIAGNOSIS — J80 ACUTE RESPIRATORY DISTRESS SYNDROME (ARDS) (H): Primary | ICD-10-CM

## 2020-09-29 ENCOUNTER — HOSPITAL ENCOUNTER (OUTPATIENT)
Dept: CARDIAC REHAB | Facility: CLINIC | Age: 75
End: 2020-09-29
Attending: PHYSICAL MEDICINE & REHABILITATION
Payer: MEDICARE

## 2020-09-29 DIAGNOSIS — J80 ACUTE RESPIRATORY DISTRESS SYNDROME (ARDS) (H): ICD-10-CM

## 2020-09-29 DIAGNOSIS — U07.1 COVID-19 VIRUS INFECTION: ICD-10-CM

## 2020-09-29 PROCEDURE — 40000244 ZZH STATISTIC VISIT PULM REHAB

## 2020-09-29 PROCEDURE — G0238 OTH RESP PROC, INDIV: HCPCS

## 2020-10-06 ENCOUNTER — HOSPITAL ENCOUNTER (OUTPATIENT)
Dept: CARDIAC REHAB | Facility: CLINIC | Age: 75
End: 2020-10-06
Attending: PHYSICAL MEDICINE & REHABILITATION
Payer: MEDICARE

## 2020-10-06 PROCEDURE — 999N000193 HC STATISTIC VISIT PULM REHAB: Performed by: REHABILITATION PRACTITIONER

## 2020-10-06 PROCEDURE — G0239 OTH RESP PROC, GROUP: HCPCS | Performed by: REHABILITATION PRACTITIONER

## 2020-10-08 ENCOUNTER — HOSPITAL ENCOUNTER (OUTPATIENT)
Dept: CARDIAC REHAB | Facility: CLINIC | Age: 75
End: 2020-10-08
Attending: PHYSICAL MEDICINE & REHABILITATION
Payer: MEDICARE

## 2020-10-08 PROCEDURE — 999N000193 HC STATISTIC VISIT PULM REHAB

## 2020-10-08 PROCEDURE — G0238 OTH RESP PROC, INDIV: HCPCS

## 2020-10-13 ENCOUNTER — HOSPITAL ENCOUNTER (OUTPATIENT)
Dept: CARDIAC REHAB | Facility: CLINIC | Age: 75
End: 2020-10-13
Attending: PHYSICAL MEDICINE & REHABILITATION
Payer: MEDICARE

## 2020-10-13 PROCEDURE — 999N000193 HC STATISTIC VISIT PULM REHAB: Performed by: REHABILITATION PRACTITIONER

## 2020-10-13 PROCEDURE — G0239 OTH RESP PROC, GROUP: HCPCS | Performed by: REHABILITATION PRACTITIONER

## 2020-10-15 ENCOUNTER — HOSPITAL ENCOUNTER (OUTPATIENT)
Dept: CARDIAC REHAB | Facility: CLINIC | Age: 75
End: 2020-10-15
Attending: PHYSICAL MEDICINE & REHABILITATION
Payer: MEDICARE

## 2020-10-15 PROCEDURE — 999N000193 HC STATISTIC VISIT PULM REHAB: Performed by: REHABILITATION PRACTITIONER

## 2020-10-15 PROCEDURE — G0238 OTH RESP PROC, INDIV: HCPCS | Performed by: REHABILITATION PRACTITIONER

## 2020-10-20 ENCOUNTER — HOSPITAL ENCOUNTER (OUTPATIENT)
Dept: CARDIAC REHAB | Facility: CLINIC | Age: 75
End: 2020-10-20
Attending: PHYSICAL MEDICINE & REHABILITATION
Payer: MEDICARE

## 2020-10-20 PROCEDURE — 999N000193 HC STATISTIC VISIT PULM REHAB: Performed by: REHABILITATION PRACTITIONER

## 2020-10-20 PROCEDURE — G0238 OTH RESP PROC, INDIV: HCPCS | Performed by: REHABILITATION PRACTITIONER

## 2020-10-22 ENCOUNTER — HOSPITAL ENCOUNTER (OUTPATIENT)
Dept: CARDIAC REHAB | Facility: CLINIC | Age: 75
End: 2020-10-22
Attending: PHYSICAL MEDICINE & REHABILITATION
Payer: MEDICARE

## 2020-10-22 PROCEDURE — G0239 OTH RESP PROC, GROUP: HCPCS | Performed by: REHABILITATION PRACTITIONER

## 2020-10-22 PROCEDURE — 999N000193 HC STATISTIC VISIT PULM REHAB: Performed by: REHABILITATION PRACTITIONER

## 2020-10-29 ENCOUNTER — HOSPITAL ENCOUNTER (OUTPATIENT)
Dept: CARDIAC REHAB | Facility: CLINIC | Age: 75
End: 2020-10-29
Attending: PHYSICAL MEDICINE & REHABILITATION
Payer: MEDICARE

## 2020-10-29 PROCEDURE — G0239 OTH RESP PROC, GROUP: HCPCS

## 2020-10-29 PROCEDURE — 999N000193 HC STATISTIC VISIT PULM REHAB

## 2020-11-03 ENCOUNTER — HOSPITAL ENCOUNTER (OUTPATIENT)
Dept: CARDIAC REHAB | Facility: CLINIC | Age: 75
End: 2020-11-03
Attending: PHYSICAL MEDICINE & REHABILITATION
Payer: MEDICARE

## 2020-11-03 PROCEDURE — 999N000193 HC STATISTIC VISIT PULM REHAB

## 2020-11-03 PROCEDURE — G0239 OTH RESP PROC, GROUP: HCPCS

## 2020-11-03 PROCEDURE — G0238 OTH RESP PROC, INDIV: HCPCS

## 2020-11-05 ENCOUNTER — HOSPITAL ENCOUNTER (OUTPATIENT)
Dept: CARDIAC REHAB | Facility: CLINIC | Age: 75
End: 2020-11-05
Attending: PHYSICAL MEDICINE & REHABILITATION
Payer: MEDICARE

## 2020-11-05 PROCEDURE — 999N000193 HC STATISTIC VISIT PULM REHAB: Performed by: REHABILITATION PRACTITIONER

## 2020-11-05 PROCEDURE — G0239 OTH RESP PROC, GROUP: HCPCS | Performed by: REHABILITATION PRACTITIONER

## 2020-11-10 ENCOUNTER — HOSPITAL ENCOUNTER (OUTPATIENT)
Dept: CARDIAC REHAB | Facility: CLINIC | Age: 75
End: 2020-11-10
Attending: PHYSICAL MEDICINE & REHABILITATION
Payer: MEDICARE

## 2020-11-10 PROCEDURE — 999N000193 HC STATISTIC VISIT PULM REHAB: Performed by: REHABILITATION PRACTITIONER

## 2020-11-10 PROCEDURE — G0238 OTH RESP PROC, INDIV: HCPCS | Performed by: REHABILITATION PRACTITIONER

## 2020-11-12 ENCOUNTER — HOSPITAL ENCOUNTER (OUTPATIENT)
Dept: CARDIAC REHAB | Facility: CLINIC | Age: 75
End: 2020-11-12
Attending: PHYSICAL MEDICINE & REHABILITATION
Payer: MEDICARE

## 2020-11-12 PROCEDURE — 93798 PHYS/QHP OP CAR RHAB W/ECG: CPT | Performed by: REHABILITATION PRACTITIONER

## 2020-11-12 PROCEDURE — 999N000193 HC STATISTIC VISIT PULM REHAB: Performed by: REHABILITATION PRACTITIONER

## 2020-11-17 ENCOUNTER — HOSPITAL ENCOUNTER (OUTPATIENT)
Dept: CARDIAC REHAB | Facility: CLINIC | Age: 75
End: 2020-11-17
Attending: PHYSICAL MEDICINE & REHABILITATION
Payer: MEDICARE

## 2020-11-17 PROCEDURE — G0238 OTH RESP PROC, INDIV: HCPCS | Performed by: REHABILITATION PRACTITIONER

## 2020-11-17 PROCEDURE — 999N000193 HC STATISTIC VISIT PULM REHAB: Performed by: REHABILITATION PRACTITIONER

## 2020-11-19 ENCOUNTER — HOSPITAL ENCOUNTER (OUTPATIENT)
Dept: CARDIAC REHAB | Facility: CLINIC | Age: 75
End: 2020-11-19
Attending: PHYSICAL MEDICINE & REHABILITATION
Payer: MEDICARE

## 2020-11-19 PROCEDURE — 999N000193 HC STATISTIC VISIT PULM REHAB: Performed by: REHABILITATION PRACTITIONER

## 2020-11-19 PROCEDURE — G0238 OTH RESP PROC, INDIV: HCPCS | Performed by: REHABILITATION PRACTITIONER

## 2020-12-16 ENCOUNTER — VIRTUAL VISIT (OUTPATIENT)
Dept: PHYSICAL MEDICINE AND REHAB | Facility: CLINIC | Age: 75
End: 2020-12-16
Payer: MEDICARE

## 2020-12-16 VITALS
SYSTOLIC BLOOD PRESSURE: 148 MMHG | DIASTOLIC BLOOD PRESSURE: 74 MMHG | WEIGHT: 205 LBS | HEIGHT: 68 IN | HEART RATE: 54 BPM | BODY MASS INDEX: 31.07 KG/M2

## 2020-12-16 DIAGNOSIS — R53.81 PHYSICAL DECONDITIONING: ICD-10-CM

## 2020-12-16 DIAGNOSIS — Z86.16 HISTORY OF 2019 NOVEL CORONAVIRUS DISEASE (COVID-19): Primary | ICD-10-CM

## 2020-12-16 PROCEDURE — 99214 OFFICE O/P EST MOD 30 MIN: CPT | Mod: 95 | Performed by: PHYSICAL MEDICINE & REHABILITATION

## 2020-12-16 ASSESSMENT — ACTIVITIES OF DAILY LIVING (ADL)
TOILETING_ISSUES: NO
FALL_HISTORY_WITHIN_LAST_SIX_MONTHS: NO
PATIENT_/_FAMILY_COMMUNICATION_STYLE: SPOKEN LANGUAGE (ENGLISH OR BILINGUAL)
BATHING: 0-->INDEPENDENT
SWALLOWING: 0-->SWALLOWS FOODS/LIQUIDS WITHOUT DIFFICULTY
WALKING_OR_CLIMBING_STAIRS_DIFFICULTY: NO
DIFFICULTY_COMMUNICATING: NO
DRESSING/BATHING_DIFFICULTY: NO
CONCENTRATING,_REMEMBERING_OR_MAKING_DECISIONS_DIFFICULTY: NO
TOILETING: 0-->INDEPENDENT
HEARING_DIFFICULTY_OR_DEAF: NO
DIFFICULTY_EATING/SWALLOWING: NO
DOING_ERRANDS_INDEPENDENTLY_DIFFICULTY: NO
WEAR_GLASSES_OR_BLIND: YES
WERE_AUXILIARY_AIDS_OFFERED?: NO
RETIRED_COMMUNICATION: 0-->UNDERSTANDS/COMMUNICATES WITHOUT DIFFICULTY

## 2020-12-16 ASSESSMENT — MIFFLIN-ST. JEOR: SCORE: 1639.37

## 2020-12-16 ASSESSMENT — PAIN SCALES - GENERAL: PAINLEVEL: NO PAIN (0)

## 2020-12-16 NOTE — PROGRESS NOTES
"Eduardo Kemp is a 75 year old male who is being evaluated via a billable video visit.      The patient has been notified of following:     \"This video visit will be conducted via a call between you and your physician/provider. We have found that certain health care needs can be provided without the need for an in-person physical exam.  This service lets us provide the care you need with a video conversation.  If a prescription is necessary we can send it directly to your pharmacy.  If lab work is needed we can place an order for that and you can then stop by our lab to have the test done at a later time.    Video visits are billed at different rates depending on your insurance coverage.  Please reach out to your insurance provider with any questions.    If during the course of the call the physician/provider feels a video visit is not appropriate, you will not be charged for this service.\"    Patient has given verbal consent for Video visit? Yes  How would you like to obtain your AVS? MyChart  If you are dropped from the video visit, the video invite should be resent to: Text to cell phone: [phone]  Will anyone else be joining your video visit? wife               [facility] Clinic Note     Patient Name: Eduardo Kemp : 1945 Medical Record: [id number]     Requesting Physician/clinician: No att. providers found           History of Present Illness:     Eduardo Kemp is a 75 year old yo male with a past medical history of HTN, HLD, and BPH who was previously admitted for SARS-CoV-2 positive pneumonia.  His date of symptom onset is estimated to be 20 with fever and cough, which is also when he was confirmed positive with testing.  He recently traveled from [place] and his wife contracted a milder case of SARS-CoV-2.  His hospital course was complicated by a prolonged ICU stay for ARDS with intubation for 2 weeks with a secondary bacterial pneumonia.  He lost 40lbs during this admission. He has " post-ICU weakness and dyspnea and O2 desaturation on mild exertion.  He continued to stabilize and was admitted to the ARU on 4/27/20.     REHABILITATION COURSE  #Deconditioning  He endorsed global weakness on admission.  His goal is to achieve modified independence at home by discharge.  Barriers to discharge include clarification of his therapy needs, supplemental oxygen requirements, and training of his family support system.  Neuropsych testing performed on 04/29/20 showed evidence of mild cognitive impairment, but his participation in therapy and day-to-day function have been adequate, suggesting a slight effect on practical cognitive function.  Further assessment is warranted outpatient.  Physical Therapy: Modified independent in room with 4WW  Occupational Therapy: Supervision with shower transfers, independent with light home management tasks, assistance with iADLs  - Home physical and occupational therapy with home nursing  - Occupational therapy to evaluate cognition and possible need for outpatient speech/language pathology evaluation and management.    Currently:  Doing very well.  Has not used supplemental oxygen in the last 5 months.  Has completed pulm rehab.  This really helped with endurance and drive.  No issues with bowel or bladder.   Denies any depression or PTSD.  Feels back at baseline.            Past Medical and Surgical History:     Past Medical History:   Diagnosis Date     Hypertension      No past surgical history on file.         Social History:     Social History     Tobacco Use     Smoking status: Never Smoker     Smokeless tobacco: Never Used   Substance Use Topics     Alcohol use: Not Currently     Recreational drug use: none         Functional history:     The patient lives with his wife, who also had COVID19 infection, they live in [place]. The house is a [address], with 3 NEAL and lower level which he does not need to access.   He reports that his wife is in good health.  "     The patient was previously independent with ambulation without use of cane or walker, previously independent with upper and lower body self care, ADLs, and IADLs.            Family History:     No family history on file.         Medications:     Current Outpatient Medications   Medication Sig Dispense Refill     amLODIPine (NORVASC) 5 MG tablet Take 1 tablet (5 mg) by mouth daily Do not take if morning blood pressure is less than 110 systolic 30 tablet 0     aspirin (ASA) 81 MG chewable tablet Take 81 mg by mouth daily       atorvastatin (LIPITOR) 20 MG tablet TAKE 1 TABLET BY MOUTH IN THE MORNING       Glucos-MSM-C-Gm-Mwrdhl-Djviby (GLUCOSAMINE MSM COMPLEX) TABS tablet Take 1 tablet by mouth daily       hydrochlorothiazide (MICROZIDE) 12.5 MG capsule Take 1 capsule (12.5 mg) by mouth daily Do not take if morning blood pressure is less than 110 systolic. 30 capsule 0     multivitamin w/minerals (THERA-VIT-M) tablet Take 1 tablet by mouth daily 30 tablet 0     tamsulosin (FLOMAX) 0.4 MG capsule Take 1 capsule (0.4 mg) by mouth At Bedtime 30 capsule 0            Allergies:     Allergies   Allergen Reactions     Ace Inhibitors      Possible angioedema 7/15.              ROS:        ROS: 10 point ROS neg other than the symptoms noted above in the HPI.             Physical Examiniation:     VITAL SIGNS: BP (!) 148/74   Pulse 54   Ht 1.727 m (5' 8\")   Wt 93 kg (205 lb)   BMI 31.17 kg/m    BMI: Estimated body mass index is 31.17 kg/m  as calculated from the following:    Height as of this encounter: 1.727 m (5' 8\").    Weight as of this encounter: 93 kg (205 lb).    EXAM:  Patient is interacting and in no acute distress.  Awake and alert.  No facial trauma or apparent cranial nerve deficit  No aphasia present  No deformities or rashes noted               Laboratory/Imaging:     Lab Results   Component Value Date    WBC 6.0 05/01/2020     Lab Results   Component Value Date    RBC 3.93 05/01/2020     Lab Results "   Component Value Date    HGB 11.9 05/01/2020     Lab Results   Component Value Date    HCT 36.5 05/01/2020     Lab Results   Component Value Date    MCV 93 05/01/2020     Lab Results   Component Value Date    MCH 30.3 05/01/2020     Lab Results   Component Value Date    MCHC 32.6 05/01/2020     Lab Results   Component Value Date    RDW 14.0 05/01/2020     Lab Results   Component Value Date     05/01/2020                Assessment/Plan:     Eduardo was seen today for hospital f/u.    Diagnoses and all orders for this visit:    History of 2019 novel coronavirus disease (COVID-19)    Physical deconditioning        1. Patient education: In depth discussion and education was provided about the assessment and implications of each of the below recommendations for management. Patient indicated readiness to learn, all questions were answered and understanding of material presented was confirmed.  2. Work-up: none   3. Therapy/equipment/braces: continue home exercises   4. Medications: no changes  5. Interventions: discussed progressive endurance/activity   6. Referral / follow up with other providers: PCP routine  7. Follow up: as needed     Nathan Velez, DO  Physical Medicine & Rehabilitation    I spent a total of 12 minutes face-to-face with Eduardo Kemp during today's office virtual visit. Over 50% of this time was spent counseling the patient and/or coordinating care. See note for details.                   Video-Visit Details    Type of service:  Video Visit    Start: 12/16/2020 01:11 pm   Stop: 12/16/2020 01:23 pm    Originating Location (pt. Location): Home    Distant Location (provider location):  [facility] PHYSICAL MEDICINE AND REHABILITATION CLINIC [place]     Platform used for Video Visit: [employer]

## 2020-12-16 NOTE — LETTER
"2020       RE: Eduardo Kemp  [address]  [place] [address]     Dear Colleague,    Thank you for referring your patient, Eduardo Kemp, to the [facility] PHYSICAL MEDICINE AND REHABILITATION CLINIC [place] at [facility]. Please see a copy of my visit note below.    Eduardo Kemp is a 75 year old male who is being evaluated via a billable video visit.      The patient has been notified of following:     \"This video visit will be conducted via a call between you and your physician/provider. We have found that certain health care needs can be provided without the need for an in-person physical exam.  This service lets us provide the care you need with a video conversation.  If a prescription is necessary we can send it directly to your pharmacy.  If lab work is needed we can place an order for that and you can then stop by our lab to have the test done at a later time.    Video visits are billed at different rates depending on your insurance coverage.  Please reach out to your insurance provider with any questions.    If during the course of the call the physician/provider feels a video visit is not appropriate, you will not be charged for this service.\"    Patient has given verbal consent for Video visit? Yes  How would you like to obtain your AVS? MyChart  If you are dropped from the video visit, the video invite should be resent to: Text to cell phone: [phone]  Will anyone else be joining your video visit? wife           PM&R Clinic Note     Patient Name: Eduardo Kemp : 1945 Medical Record: [id number]     Requesting Physician/clinician: No att. providers found           History of Present Illness:     Eduardo Kemp is a 75 year old yo male with a past medical history of HTN, HLD, and BPH who was previously admitted for SARS-CoV-2 positive pneumonia.  His date of symptom onset is estimated to be 20 with fever and cough, which is also " when he was confirmed positive with testing.  He recently traveled from [place] and his wife contracted a milder case of SARS-CoV-2.  His hospital course was complicated by a prolonged ICU stay for ARDS with intubation for 2 weeks with a secondary bacterial pneumonia.  He lost 40lbs during this admission. He has post-ICU weakness and dyspnea and O2 desaturation on mild exertion.  He continued to stabilize and was admitted to the ARU on 4/27/20.     REHABILITATION COURSE  #Deconditioning  He endorsed global weakness on admission.  His goal is to achieve modified independence at home by discharge.  Barriers to discharge include clarification of his therapy needs, supplemental oxygen requirements, and training of his family support system.  Neuropsych testing performed on 04/29/20 showed evidence of mild cognitive impairment, but his participation in therapy and day-to-day function have been adequate, suggesting a slight effect on practical cognitive function.  Further assessment is warranted outpatient.  Physical Therapy: Modified independent in room with 4WW  Occupational Therapy: Supervision with shower transfers, independent with light home management tasks, assistance with iADLs  - Home physical and occupational therapy with home nursing  - Occupational therapy to evaluate cognition and possible need for outpatient speech/language pathology evaluation and management.    Currently:  Doing very well.  Has not used supplemental oxygen in the last 5 months.  Has completed pulm rehab.  This really helped with endurance and drive.  No issues with bowel or bladder.   Denies any depression or PTSD.  Feels back at baseline.            Past Medical and Surgical History:     Past Medical History:   Diagnosis Date     Hypertension      No past surgical history on file.         Social History:     Social History     Tobacco Use     Smoking status: Never Smoker     Smokeless tobacco: Never Used   Substance Use Topics      "Alcohol use: Not Currently     Recreational drug use: none         Functional history:     The patient lives with his wife, who also had COVID19 infection, they live in [place]. The house is a [address], with 3 [address] and lower level which he does not need to access.   He reports that his wife is in good health.      The patient was previously independent with ambulation without use of cane or walker, previously independent with upper and lower body self care, ADLs, and IADLs.            Family History:     No family history on file.         Medications:     Current Outpatient Medications   Medication Sig Dispense Refill     amLODIPine (NORVASC) 5 MG tablet Take 1 tablet (5 mg) by mouth daily Do not take if morning blood pressure is less than 110 systolic 30 tablet 0     aspirin (ASA) 81 MG chewable tablet Take 81 mg by mouth daily       atorvastatin (LIPITOR) 20 MG tablet TAKE 1 TABLET BY MOUTH IN THE MORNING       Glucos-MSM-C-Ft-Bmqhiy-Ikttgz (GLUCOSAMINE MSM COMPLEX) TABS tablet Take 1 tablet by mouth daily       hydrochlorothiazide (MICROZIDE) 12.5 MG capsule Take 1 capsule (12.5 mg) by mouth daily Do not take if morning blood pressure is less than 110 systolic. 30 capsule 0     multivitamin w/minerals (THERA-VIT-M) tablet Take 1 tablet by mouth daily 30 tablet 0     tamsulosin (FLOMAX) 0.4 MG capsule Take 1 capsule (0.4 mg) by mouth At Bedtime 30 capsule 0            Allergies:     Allergies   Allergen Reactions     Ace Inhibitors      Possible angioedema 7/15.              ROS:        ROS: 10 point ROS neg other than the symptoms noted above in the HPI.             Physical Examiniation:     VITAL SIGNS: BP (!) 148/74   Pulse 54   Ht 1.727 m (5' 8\")   Wt 93 kg (205 lb)   BMI 31.17 kg/m    BMI: Estimated body mass index is 31.17 kg/m  as calculated from the following:    Height as of this encounter: 1.727 m (5' 8\").    Weight as of this encounter: 93 kg (205 lb).    EXAM:  Patient is interacting and in " no acute distress.  Awake and alert.  No facial trauma or apparent cranial nerve deficit  No aphasia present  No deformities or rashes noted           Laboratory/Imaging:     Lab Results   Component Value Date    WBC 6.0 05/01/2020     Lab Results   Component Value Date    RBC 3.93 05/01/2020     Lab Results   Component Value Date    HGB 11.9 05/01/2020     Lab Results   Component Value Date    HCT 36.5 05/01/2020     Lab Results   Component Value Date    MCV 93 05/01/2020     Lab Results   Component Value Date    MCH 30.3 05/01/2020     Lab Results   Component Value Date    MCHC 32.6 05/01/2020     Lab Results   Component Value Date    RDW 14.0 05/01/2020     Lab Results   Component Value Date     05/01/2020              Assessment/Plan:     Eduardo was seen today for hospital f/u.    Diagnoses and all orders for this visit:    History of 2019 novel coronavirus disease (COVID-19)    Physical deconditioning    1. Patient education: In depth discussion and education was provided about the assessment and implications of each of the below recommendations for management. Patient indicated readiness to learn, all questions were answered and understanding of material presented was confirmed.  2. Work-up: none   3. Therapy/equipment/braces: continue home exercises   4. Medications: no changes  5. Interventions: discussed progressive endurance/activity   6. Referral / follow up with other providers: PCP routine  7. Follow up: as needed     Nathan Velez, DO  Physical Medicine & Rehabilitation    I spent a total of 12 minutes face-to-face with Eduardo Kemp during today's office virtual visit. Over 50% of this time was spent counseling the patient and/or coordinating care. See note for details.       Video-Visit Details    Type of service:  Video Visit    Start: 12/16/2020 01:11 pm   Stop: 12/16/2020 01:23 pm    Originating Location (pt. Location): Home    Distant Location (provider location):  [facility]  PHYSICAL MEDICINE AND REHABILITATION CLINIC [place]     Platform used for Video Visit: Cheri

## 2020-12-16 NOTE — NURSING NOTE
"Chief Complaint   Patient presents with     Hospital F/U     Post COVID F/U       Vitals:    12/16/20 1254   BP: (!) 148/74   Pulse: 54   Weight: 93 kg (205 lb)   Height: 1.727 m (5' 8\")       Body mass index is 31.17 kg/m .                  .          "

## 2021-01-04 ENCOUNTER — HEALTH MAINTENANCE LETTER (OUTPATIENT)
Age: 76
End: 2021-01-04

## 2021-06-07 NOTE — ANESTHESIA POSTPROCEDURE EVALUATION
Patient: Eduardo Kemp  Anesthesia type: No value filed.    Patient location: ICU - Room 74  Pt vitals stable throughout, 50 of Rocuronium given along with the patients sedation drips that were already running.  Old 8.0 ETT pulled once cords were visualized on glidescope.  New 8.5 ETT placed at 25 at Lip, ETCO2 to verify.      Additional Notes:  ICU - Room 74  Ordering MD - Chon  Tube Exchange for mucus plug, Shaker requests.

## 2021-06-07 NOTE — ANESTHESIA PROCEDURE NOTES
Emergent Intubation    Date/Time: 4/6/2020 9:20 AM    CRNA: Kristy Hernández CRNA  Indications: respiratory failure  Medication administration time:4/6/2020 9:20 AMRoute: oral  Tube size: 8.5 mm  Tube type: cuffed  Cuff inflated: yes  Level of Difficulty: 0ETT to lip: 25 cm  Tube secured with: ETT roger  ETCO2 = Yes  Breath sounds: equal  SaO2 %: 99    Sign out given. CXR and sedation per primary care team.

## 2021-10-10 ENCOUNTER — HEALTH MAINTENANCE LETTER (OUTPATIENT)
Age: 76
End: 2021-10-10

## 2021-11-22 NOTE — CONSULTS
[facility]  Consult Note - Hospitalist Service     Date of Admission:  4/27/2020  Consult Requested by: PMR Staff  Reason for Consult: Medical co-management    Assessment & Plan   Eduardo Kemp is a 75 year old male admitted on 4/27/2020 to [facility]. He has a past medical history of BPH, hypertension and a recent month long hospital stay at [facility] for COVID-19 and pneumonia.     1. Generalized weakness, debility  -PT/OT staff are working with him on strength and mobility training to get him back home to independent care    2. ARDS secondary to COVID-19 and pneumonia, resolved  3. Hypoxic respiratory failure secondary to #2, resolving  -He is still on 1-2L nasal canula to maintain O2>90%. While he can hobble to the bathroom, he gets winded pretty easily doing so.   -If he becomes wheezy, would prefer using Albuterol inhaler over nebulizers, if possible  -Maintain droplet precautions as long as COVID testing positive and he has any respiratory symptoms  -Continue Enoxaparin for VTE prophylaxis (he has already supassed 30d post COVID needed for anticoagulation, this is just for hospital VTE prophylaxis and can be discontinued at discharge)    3. BPH  -Continue home regimen of Flomax 0.4mg once daily    4. Hypertension  -Stop Lasix. Continue home hydrochlorothiazide 12.5mg once daily       MIKE Vargas  [facility]    ______________________________________________________________________      History of Present Illness   Eduardo Kemp is a 75 year old male admitted on 4/27/2020 to [facility]. He has a past medical history of BPH, hypertension and his only admission to a hospital for his lifetime was at age 7 when he had his tonsils removed. 5 weeks ago, he developed cough, shortness of breath and nearly passed out at home and brought to the ER for evaluation. His work up revealed he was COVID positive, in acute respiratory  Pt seen and examined POD2 s/p cabg    In chair.  Off bipap  BP stable - will remove A line    Cr up to >2.0 after diuresis yesterday    Hgb stable    CT output a little too high      Will keep tubes one more day  Hold diuresis  OOB with IS, PT  Keep in unit today distress and had pneumonia. He was admitted to [facility] and treated for the past month with initial intubation, vasoporessor support, broad spectrum antibiotics and was able to wean off those supports and for the past three weeks has been recovering at [facility]. He weaned off antibiotics, he weaned down on oxygen to 1-2L and was able to walk up and down the hallways at the hospital without feeling short of breath. He no longer has a cough and has been fever free over a month. His repeat COVID testing on 4/22/20 was still positive, likely secondary to remaining RNA in his system. He was started on Rivaroxaban for VTE support just prior to discharge. [facility] for ongoing therapy before returning home. He denies any chest pain, fevers, chills, lightheadedness, nausea, vomiting, diarrhea or pain of any kind. He is hemodynamically stable, afebrile. He is a good historian and at baseline, he is an active grandfather in good health.    Review of Systems   The 10 point Review of Systems is negative other than noted in the HPI or here.     Past Medical History    I have reviewed this patient's medical history and updated it with pertinent information if needed.   Past Medical History:   Diagnosis Date     Hypertension        Past Surgical History   I have reviewed this patient's surgical history and updated it with pertinent information if needed.  No past surgical history on file.    Social History   I have reviewed this patient's social history and updated it with pertinent information if needed.  Social History     Tobacco Use     Smoking status: Never Smoker     Smokeless tobacco: Never Used   Substance Use Topics     Alcohol use: Not Currently     Drug use: Never       Family History   I have reviewed this patient's family history and updated it with pertinent information if needed.   History reviewed. No pertinent family history.    Medications   I have reviewed this patient's  current medications    Allergies   Allergies   Allergen Reactions     Ace Inhibitors      Possible angioedema 7/15.       Physical Exam   Vital Signs: Temp: 98.1  F (36.7  C) Temp src: Oral BP: 130/88 Pulse: 108   Resp: 20 SpO2: 92 % O2 Device: Nasal cannula Oxygen Delivery: 4 LPM  Weight: 199 lbs 9.6 oz    General Appearance: 75 year old gentleman resting comfortably in bed  HEENT: normocephalic, atraumatic, pupils are equal in size, equally reactive to light bilaterally, anicteric sclerae, EOM intact  Respiratory: mildly increased respiratory effort on 2L nasal canula, distant breath sounds throughout bilateral lung fields, no wheezing auscultated bialterally  Cardiovascular: regular rate and rhythm, no appreciable murmurs, rubs or gallops  GI: faint bowel sounds present, soft, non-tender to palpation throughout, no rebound or guarding  Skin: warm, dry, no open sores, lesions or ulcerations  Psychiatric: alert, oriented to name, hospital, date and recent events, great historian    Data   Results for orders placed or performed during the hospital encounter of 20 (from the past 24 hour(s))   Social Work IP Consult    Narrative    Vita Brown, LIC     2020  2:43 PM   20 1300   Living Arrangements   Lives With spouse   Living Arrangements house   Able to Return to Prior Arrangements yes   Living Arrangement Comments Two-story ranch home. 3 NEAL.    Home Safety   Patient Feels Safe Living in Home? yes   Discharge Planning   Patient/Family Anticipates Transition to home with family;home   with help/services   Disposition Comments Wife in good health, also tested positive   for COVID-19   Concerns to be Addressed no discharge needs identified   Plan   Plan Home with family A        Social Work: Initial Assessment with Discharge Plan    Patient Name: dEuardo Kemp  : 1945  Age: 75 year old  MRN: [id number]  Completed assessment with: Chart review and pt interview (by   phone)   Admitted to  "[facility]: Today 04/27/2020     Presenting Information   Date of SW assessment: April 27, 2020  Health Care Directive: Health Care Directive Agent (if patient   not able to make decisions)  Primary Health Care Agent: Patient/self   Secondary Health Care Agent: Pt wife DEVINK   Living Situation: Pt and wife live in a ranch-style home in   [place], [place]. Two-story (basement). Laundry on the main-level. 3   NEAL front door and 2 NEAL from garage (grab bar). All living on   the main level.   Previous Functional Status: Independent with ADLs and IADLs. No   use of assistive device. Manage own medications and finances.   Driving.   DME available: None reported.   Patient and family understanding of hospitalization: \"I am   keeping my spirits up and excited to get home\". 15/15 on BIMs.   Cultural/Language/Spiritual Considerations: Bahai-hussain, 74 y/o  male. English-speaking.       Physical Health  Reason for admission: COVID-19 virus infection     Provider Information   Primary Care Physician:Delvin Koch (PREVIOUS pcp, RECENTLY   SAW NEW MD AT [place], NEED TO CHECK WITH PT).   [facility]  : None reported     Mental Health/Chemical Dependency:   Diagnosis: \"None at all\"  Alcohol/Tobacco/Narcotis: hx of tobacco (quit 1980) use and   occasional alcohol use   Support/Services in Place: None reported   Services Needed/Recommended: Supportive services available by   consult   Sexuality/Intimacy: Not discussed     Support System  Marital Status: . Wife also tested positive for COVID-19.   Pt reported that his wife is in good health. Able to assist at   discharge.   Family support: 3 adult children, live local. 3 grandchildren and   1 more on the way. 3 siblings, one [place], one [place] and one [place].   Oldest brother passed away a few years ago,  in   [place] war.   Other support available: Just moved last summer to new home, not   many neighbors. Next door neighbor is EMT, " don't know very well.   Have been offering to help wife.     Community Resources  Current in home services: None reported   Previous services: None reported     Financial/Employment/Education  Employment Status: Retired   Income Source: Social Security   Education: Not discussed   Financial Concerns:  None reported   Insurance: Medicare and AARP       Discharge Plan   Patient and family discharge goal: Home   Provided Education on discharge plan: YES  Patient agreeable to discharge plan:  YES  Provided education and attained signature for Medicare IM and IRF   Patient Rights and Privacy Information provided to patient :   YES-discussed over the phone   Provided patient with [facility] Resources:   N/A   Barriers to discharge: None indicated at this time     Discharge Recommendations   Disposition: Home with family assistance   Transportation Needs: Family assist   Name of Transportation Company and Phone: N/A     Additional comments   15/15 on BIMs. Interview conducted over the phone. Denied any   immediate needs or concerns. Gave SW permission to contact his   wife in the next 1-2 days to introduce self. Discharge needs   pending.     Please invite to Care Conference:  Pt spouse       Vita Brown, KEILA, [facility] Acute Rehab Unit   Phone: [phone]  I   Pager: [phone]           Platelet count   Result Value Ref Range    Platelet Count 194 150 - 450 10e9/L   Creatinine   Result Value Ref Range    Creatinine 0.76 0.66 - 1.25 mg/dL    GFR Estimate 89 >60 mL/min/[1.73_m2]    GFR Estimate If Black >90 >60 mL/min/[1.73_m2]

## 2022-01-30 ENCOUNTER — HEALTH MAINTENANCE LETTER (OUTPATIENT)
Age: 77
End: 2022-01-30

## 2022-09-24 ENCOUNTER — HEALTH MAINTENANCE LETTER (OUTPATIENT)
Age: 77
End: 2022-09-24

## 2023-05-08 ENCOUNTER — HEALTH MAINTENANCE LETTER (OUTPATIENT)
Age: 78
End: 2023-05-08